# Patient Record
Sex: FEMALE | Race: WHITE | HISPANIC OR LATINO
[De-identification: names, ages, dates, MRNs, and addresses within clinical notes are randomized per-mention and may not be internally consistent; named-entity substitution may affect disease eponyms.]

---

## 2024-09-30 ENCOUNTER — RESULT REVIEW (OUTPATIENT)
Age: 74
End: 2024-09-30

## 2024-09-30 ENCOUNTER — INPATIENT (INPATIENT)
Facility: HOSPITAL | Age: 74
LOS: 13 days | Discharge: NURSING, REHAB OR LONG-TERM CARE | DRG: 282 | End: 2024-10-14
Attending: STUDENT IN AN ORGANIZED HEALTH CARE EDUCATION/TRAINING PROGRAM | Admitting: INTERNAL MEDICINE
Payer: MEDICARE

## 2024-09-30 VITALS
RESPIRATION RATE: 20 BRPM | TEMPERATURE: 98 F | HEIGHT: 69 IN | DIASTOLIC BLOOD PRESSURE: 67 MMHG | SYSTOLIC BLOOD PRESSURE: 95 MMHG | HEART RATE: 99 BPM | WEIGHT: 167.55 LBS | OXYGEN SATURATION: 98 %

## 2024-09-30 PROCEDURE — 99291 CRITICAL CARE FIRST HOUR: CPT | Mod: GC

## 2024-09-30 NOTE — ED ADULT TRIAGE NOTE - CCCP TRG CHIEF CMPLNT
Pt states she has had a migraine for a little over a week. Pt states she was seen by her primary care doctor for same and was given injections. Pt states pain has continued.     Heidi Gutierrez RN
cardiology transfer

## 2024-09-30 NOTE — ED ADULT TRIAGE NOTE - CHIEF COMPLAINT QUOTE
Quality 47: Advance Care Plan: Advance care planning not documented, reason not otherwise specified. Quality 110: Preventive Care And Screening: Influenza Immunization: Influenza Immunization previously received during influenza season Detail Level: Generalized Quality 226: Preventive Care And Screening: Tobacco Use: Screening And Cessation Intervention: Patient screened for tobacco use and is an ex/non-smoker cardiology transfer, NSTEMI

## 2024-10-01 DIAGNOSIS — I63.9 CEREBRAL INFARCTION, UNSPECIFIED: Chronic | ICD-10-CM

## 2024-10-01 DIAGNOSIS — I25.10 ATHEROSCLEROTIC HEART DISEASE OF NATIVE CORONARY ARTERY WITHOUT ANGINA PECTORIS: ICD-10-CM

## 2024-10-01 DIAGNOSIS — I21.9 ACUTE MYOCARDIAL INFARCTION, UNSPECIFIED: ICD-10-CM

## 2024-10-01 DIAGNOSIS — E03.9 HYPOTHYROIDISM, UNSPECIFIED: ICD-10-CM

## 2024-10-01 LAB
A1C WITH ESTIMATED AVERAGE GLUCOSE RESULT: 6.8 % — HIGH (ref 4–5.6)
ALBUMIN SERPL ELPH-MCNC: 3.3 G/DL — SIGNIFICANT CHANGE UP (ref 3.3–5)
ALBUMIN SERPL ELPH-MCNC: 3.5 G/DL — SIGNIFICANT CHANGE UP (ref 3.3–5)
ALP SERPL-CCNC: 118 U/L — SIGNIFICANT CHANGE UP (ref 40–120)
ALP SERPL-CCNC: 129 U/L — HIGH (ref 40–120)
ALT FLD-CCNC: 48 U/L — HIGH (ref 10–45)
ALT FLD-CCNC: 53 U/L — HIGH (ref 10–45)
ANION GAP SERPL CALC-SCNC: 17 MMOL/L — SIGNIFICANT CHANGE UP (ref 5–17)
ANION GAP SERPL CALC-SCNC: 18 MMOL/L — HIGH (ref 5–17)
APPEARANCE UR: CLEAR — SIGNIFICANT CHANGE UP
APTT BLD: 37.7 SEC — HIGH (ref 24.5–35.6)
APTT BLD: 41.8 SEC — HIGH (ref 24.5–35.6)
APTT BLD: 45.8 SEC — HIGH (ref 24.5–35.6)
APTT BLD: 90.4 SEC — HIGH (ref 24.5–35.6)
AST SERPL-CCNC: 85 U/L — HIGH (ref 10–40)
AST SERPL-CCNC: 88 U/L — HIGH (ref 10–40)
BASE EXCESS BLDMV CALC-SCNC: -7 MMOL/L — LOW (ref -3–3)
BASOPHILS # BLD AUTO: 0 K/UL — SIGNIFICANT CHANGE UP (ref 0–0.2)
BASOPHILS # BLD AUTO: 0.05 K/UL — SIGNIFICANT CHANGE UP (ref 0–0.2)
BASOPHILS NFR BLD AUTO: 0 % — SIGNIFICANT CHANGE UP (ref 0–2)
BASOPHILS NFR BLD AUTO: 0.3 % — SIGNIFICANT CHANGE UP (ref 0–2)
BILIRUB SERPL-MCNC: 0.5 MG/DL — SIGNIFICANT CHANGE UP (ref 0.2–1.2)
BILIRUB SERPL-MCNC: 0.6 MG/DL — SIGNIFICANT CHANGE UP (ref 0.2–1.2)
BILIRUB UR-MCNC: NEGATIVE — SIGNIFICANT CHANGE UP
BLD GP AB SCN SERPL QL: NEGATIVE — SIGNIFICANT CHANGE UP
BUN SERPL-MCNC: 19 MG/DL — SIGNIFICANT CHANGE UP (ref 7–23)
BUN SERPL-MCNC: 21 MG/DL — SIGNIFICANT CHANGE UP (ref 7–23)
CALCIUM SERPL-MCNC: 8.3 MG/DL — LOW (ref 8.4–10.5)
CALCIUM SERPL-MCNC: 8.6 MG/DL — SIGNIFICANT CHANGE UP (ref 8.4–10.5)
CHLORIDE SERPL-SCNC: 99 MMOL/L — SIGNIFICANT CHANGE UP (ref 96–108)
CHLORIDE SERPL-SCNC: 99 MMOL/L — SIGNIFICANT CHANGE UP (ref 96–108)
CHOLEST SERPL-MCNC: 82 MG/DL — SIGNIFICANT CHANGE UP
CO2 BLDMV-SCNC: 20 MMOL/L — LOW (ref 21–29)
CO2 SERPL-SCNC: 16 MMOL/L — LOW (ref 22–31)
CO2 SERPL-SCNC: 18 MMOL/L — LOW (ref 22–31)
COLOR SPEC: YELLOW — SIGNIFICANT CHANGE UP
CREAT SERPL-MCNC: 1.28 MG/DL — SIGNIFICANT CHANGE UP (ref 0.5–1.3)
CREAT SERPL-MCNC: 1.29 MG/DL — SIGNIFICANT CHANGE UP (ref 0.5–1.3)
DIFF PNL FLD: ABNORMAL
EGFR: 44 ML/MIN/1.73M2 — LOW
EGFR: 44 ML/MIN/1.73M2 — LOW
EOSINOPHIL # BLD AUTO: 0 K/UL — SIGNIFICANT CHANGE UP (ref 0–0.5)
EOSINOPHIL # BLD AUTO: 0 K/UL — SIGNIFICANT CHANGE UP (ref 0–0.5)
EOSINOPHIL NFR BLD AUTO: 0 % — SIGNIFICANT CHANGE UP (ref 0–6)
EOSINOPHIL NFR BLD AUTO: 0 % — SIGNIFICANT CHANGE UP (ref 0–6)
ESTIMATED AVERAGE GLUCOSE: 148 MG/DL — HIGH (ref 68–114)
FIBRINOGEN PPP-MCNC: 790 MG/DL — HIGH (ref 200–445)
GAS PNL BLDMV: SIGNIFICANT CHANGE UP
GAS PNL BLDV: SIGNIFICANT CHANGE UP
GIANT PLATELETS BLD QL SMEAR: PRESENT — SIGNIFICANT CHANGE UP
GLUCOSE BLDC GLUCOMTR-MCNC: 126 MG/DL — HIGH (ref 70–99)
GLUCOSE BLDC GLUCOMTR-MCNC: 132 MG/DL — HIGH (ref 70–99)
GLUCOSE BLDC GLUCOMTR-MCNC: 221 MG/DL — HIGH (ref 70–99)
GLUCOSE SERPL-MCNC: 202 MG/DL — HIGH (ref 70–99)
GLUCOSE SERPL-MCNC: 217 MG/DL — HIGH (ref 70–99)
GLUCOSE UR QL: NEGATIVE MG/DL — SIGNIFICANT CHANGE UP
HCO3 BLDMV-SCNC: 19 MMOL/L — LOW (ref 20–28)
HCT VFR BLD CALC: 25 % — LOW (ref 34.5–45)
HCT VFR BLD CALC: 27 % — LOW (ref 34.5–45)
HCT VFR BLD CALC: 29.4 % — LOW (ref 34.5–45)
HDLC SERPL-MCNC: 27 MG/DL — LOW
HGB BLD-MCNC: 8.2 G/DL — LOW (ref 11.5–15.5)
HGB BLD-MCNC: 8.8 G/DL — LOW (ref 11.5–15.5)
HGB BLD-MCNC: 9.3 G/DL — LOW (ref 11.5–15.5)
IMM GRANULOCYTES NFR BLD AUTO: 0.7 % — SIGNIFICANT CHANGE UP (ref 0–0.9)
INR BLD: 1.22 RATIO — HIGH (ref 0.85–1.16)
INR BLD: 1.24 RATIO — HIGH (ref 0.85–1.16)
KETONES UR-MCNC: ABNORMAL MG/DL
LACTATE BLDV-MCNC: 1.6 MMOL/L — SIGNIFICANT CHANGE UP (ref 0.5–2)
LEUKOCYTE ESTERASE UR-ACNC: ABNORMAL
LIPID PNL WITH DIRECT LDL SERPL: 37 MG/DL — SIGNIFICANT CHANGE UP
LYMPHOCYTES # BLD AUTO: 1.37 K/UL — SIGNIFICANT CHANGE UP (ref 1–3.3)
LYMPHOCYTES # BLD AUTO: 1.58 K/UL — SIGNIFICANT CHANGE UP (ref 1–3.3)
LYMPHOCYTES # BLD AUTO: 6.9 % — LOW (ref 13–44)
LYMPHOCYTES # BLD AUTO: 7.4 % — LOW (ref 13–44)
MAGNESIUM SERPL-MCNC: 1.7 MG/DL — SIGNIFICANT CHANGE UP (ref 1.6–2.6)
MANUAL SMEAR VERIFICATION: SIGNIFICANT CHANGE UP
MCHC RBC-ENTMCNC: 27 PG — SIGNIFICANT CHANGE UP (ref 27–34)
MCHC RBC-ENTMCNC: 27.1 PG — SIGNIFICANT CHANGE UP (ref 27–34)
MCHC RBC-ENTMCNC: 27.6 PG — SIGNIFICANT CHANGE UP (ref 27–34)
MCHC RBC-ENTMCNC: 31.6 GM/DL — LOW (ref 32–36)
MCHC RBC-ENTMCNC: 32.6 GM/DL — SIGNIFICANT CHANGE UP (ref 32–36)
MCHC RBC-ENTMCNC: 32.8 GM/DL — SIGNIFICANT CHANGE UP (ref 32–36)
MCV RBC AUTO: 82.5 FL — SIGNIFICANT CHANGE UP (ref 80–100)
MCV RBC AUTO: 84.6 FL — SIGNIFICANT CHANGE UP (ref 80–100)
MCV RBC AUTO: 85.2 FL — SIGNIFICANT CHANGE UP (ref 80–100)
MONOCYTES # BLD AUTO: 1.13 K/UL — HIGH (ref 0–0.9)
MONOCYTES # BLD AUTO: 1.4 K/UL — HIGH (ref 0–0.9)
MONOCYTES NFR BLD AUTO: 6.1 % — SIGNIFICANT CHANGE UP (ref 2–14)
MONOCYTES NFR BLD AUTO: 6.1 % — SIGNIFICANT CHANGE UP (ref 2–14)
MRSA PCR RESULT.: SIGNIFICANT CHANGE UP
NEUTROPHILS # BLD AUTO: 15.8 K/UL — HIGH (ref 1.8–7.4)
NEUTROPHILS # BLD AUTO: 19.93 K/UL — HIGH (ref 1.8–7.4)
NEUTROPHILS NFR BLD AUTO: 85.5 % — HIGH (ref 43–77)
NEUTROPHILS NFR BLD AUTO: 87 % — HIGH (ref 43–77)
NITRITE UR-MCNC: NEGATIVE — SIGNIFICANT CHANGE UP
NON HDL CHOLESTEROL: 55 MG/DL — SIGNIFICANT CHANGE UP
NRBC # BLD: 0 /100 WBCS — SIGNIFICANT CHANGE UP (ref 0–0)
NRBC # BLD: 0 /100 WBCS — SIGNIFICANT CHANGE UP (ref 0–0)
NT-PROBNP SERPL-SCNC: HIGH PG/ML (ref 0–300)
O2 CT VFR BLD CALC: 35 MMHG — SIGNIFICANT CHANGE UP (ref 30–65)
PA ADP PRP-ACNC: 181 PRU — LOW (ref 182–335)
PCO2 BLDMV: 37 MMHG — SIGNIFICANT CHANGE UP (ref 30–65)
PH BLDMV: 7.31 — LOW (ref 7.32–7.45)
PH UR: 5.5 — SIGNIFICANT CHANGE UP (ref 5–8)
PHOSPHATE SERPL-MCNC: 4.3 MG/DL — SIGNIFICANT CHANGE UP (ref 2.5–4.5)
PLAT MORPH BLD: NORMAL — SIGNIFICANT CHANGE UP
PLATELET # BLD AUTO: 193 K/UL — SIGNIFICANT CHANGE UP (ref 150–400)
PLATELET # BLD AUTO: 211 K/UL — SIGNIFICANT CHANGE UP (ref 150–400)
PLATELET # BLD AUTO: 250 K/UL — SIGNIFICANT CHANGE UP (ref 150–400)
POTASSIUM SERPL-MCNC: 4 MMOL/L — SIGNIFICANT CHANGE UP (ref 3.5–5.3)
POTASSIUM SERPL-MCNC: 4 MMOL/L — SIGNIFICANT CHANGE UP (ref 3.5–5.3)
POTASSIUM SERPL-SCNC: 4 MMOL/L — SIGNIFICANT CHANGE UP (ref 3.5–5.3)
POTASSIUM SERPL-SCNC: 4 MMOL/L — SIGNIFICANT CHANGE UP (ref 3.5–5.3)
PROT SERPL-MCNC: 7.2 G/DL — SIGNIFICANT CHANGE UP (ref 6–8.3)
PROT SERPL-MCNC: 7.6 G/DL — SIGNIFICANT CHANGE UP (ref 6–8.3)
PROT UR-MCNC: NEGATIVE MG/DL — SIGNIFICANT CHANGE UP
PROTHROM AB SERPL-ACNC: 13.9 SEC — HIGH (ref 9.9–13.4)
PROTHROM AB SERPL-ACNC: 14.1 SEC — HIGH (ref 9.9–13.4)
RBC # BLD: 3.03 M/UL — LOW (ref 3.8–5.2)
RBC # BLD: 3.19 M/UL — LOW (ref 3.8–5.2)
RBC # BLD: 3.45 M/UL — LOW (ref 3.8–5.2)
RBC # FLD: 14.8 % — HIGH (ref 10.3–14.5)
RBC # FLD: 14.8 % — HIGH (ref 10.3–14.5)
RBC # FLD: 15 % — HIGH (ref 10.3–14.5)
RBC BLD AUTO: NORMAL — SIGNIFICANT CHANGE UP
RH IG SCN BLD-IMP: POSITIVE — SIGNIFICANT CHANGE UP
RH IG SCN BLD-IMP: POSITIVE — SIGNIFICANT CHANGE UP
S AUREUS DNA NOSE QL NAA+PROBE: SIGNIFICANT CHANGE UP
SAO2 % BLDMV: 61.3 — SIGNIFICANT CHANGE UP (ref 60–90)
SODIUM SERPL-SCNC: 133 MMOL/L — LOW (ref 135–145)
SODIUM SERPL-SCNC: 134 MMOL/L — LOW (ref 135–145)
SP GR SPEC: 1.02 — SIGNIFICANT CHANGE UP (ref 1–1.03)
T3 SERPL-MCNC: 82 NG/DL — SIGNIFICANT CHANGE UP (ref 80–200)
T4 AB SER-ACNC: 8.1 UG/DL — SIGNIFICANT CHANGE UP (ref 4.6–12)
T4 FREE SERPL-MCNC: 1.8 NG/DL — SIGNIFICANT CHANGE UP (ref 0.9–1.8)
TRIGL SERPL-MCNC: 92 MG/DL — SIGNIFICANT CHANGE UP
TROPONIN T, HIGH SENSITIVITY RESULT: 1194 NG/L — HIGH (ref 0–51)
TSH SERPL-MCNC: 0.63 UIU/ML — SIGNIFICANT CHANGE UP (ref 0.27–4.2)
UROBILINOGEN FLD QL: 1 MG/DL — SIGNIFICANT CHANGE UP (ref 0.2–1)
WBC # BLD: 17.83 K/UL — HIGH (ref 3.8–10.5)
WBC # BLD: 18.48 K/UL — HIGH (ref 3.8–10.5)
WBC # BLD: 22.91 K/UL — HIGH (ref 3.8–10.5)
WBC # FLD AUTO: 17.83 K/UL — HIGH (ref 3.8–10.5)
WBC # FLD AUTO: 18.48 K/UL — HIGH (ref 3.8–10.5)
WBC # FLD AUTO: 22.91 K/UL — HIGH (ref 3.8–10.5)

## 2024-10-01 PROCEDURE — 74018 RADEX ABDOMEN 1 VIEW: CPT | Mod: 26

## 2024-10-01 PROCEDURE — 99291 CRITICAL CARE FIRST HOUR: CPT

## 2024-10-01 PROCEDURE — 33967 INSERT I-AORT PERCUT DEVICE: CPT

## 2024-10-01 PROCEDURE — 99292 CRITICAL CARE ADDL 30 MIN: CPT | Mod: FS

## 2024-10-01 PROCEDURE — 71045 X-RAY EXAM CHEST 1 VIEW: CPT | Mod: 26

## 2024-10-01 PROCEDURE — 99152 MOD SED SAME PHYS/QHP 5/>YRS: CPT

## 2024-10-01 PROCEDURE — 93010 ELECTROCARDIOGRAM REPORT: CPT

## 2024-10-01 PROCEDURE — 93880 EXTRACRANIAL BILAT STUDY: CPT | Mod: 26

## 2024-10-01 PROCEDURE — 93456 R HRT CORONARY ARTERY ANGIO: CPT | Mod: 26

## 2024-10-01 PROCEDURE — 93306 TTE W/DOPPLER COMPLETE: CPT | Mod: 26

## 2024-10-01 RX ORDER — LOSARTAN POTASSIUM 100 MG/1
1 TABLET, FILM COATED ORAL
Refills: 0 | DISCHARGE

## 2024-10-01 RX ORDER — METOCLOPRAMIDE HCL 5 MG
10 TABLET ORAL ONCE
Refills: 0 | Status: COMPLETED | OUTPATIENT
Start: 2024-10-01 | End: 2024-10-01

## 2024-10-01 RX ORDER — FUROSEMIDE 10 MG/ML
40 INJECTION INTRAVENOUS ONCE
Refills: 0 | Status: COMPLETED | OUTPATIENT
Start: 2024-10-01 | End: 2024-10-01

## 2024-10-01 RX ORDER — INSULIN LISPRO 100/ML
VIAL (ML) SUBCUTANEOUS EVERY 6 HOURS
Refills: 0 | Status: DISCONTINUED | OUTPATIENT
Start: 2024-10-01 | End: 2024-10-02

## 2024-10-01 RX ORDER — CHLORHEXIDINE GLUCONATE ORAL RINSE 1.2 MG/ML
1 SOLUTION DENTAL ONCE
Refills: 0 | Status: COMPLETED | OUTPATIENT
Start: 2024-10-02 | End: 2024-10-02

## 2024-10-01 RX ORDER — ASPIRIN 325 MG
81 TABLET ORAL DAILY
Refills: 0 | Status: DISCONTINUED | OUTPATIENT
Start: 2024-10-01 | End: 2024-10-03

## 2024-10-01 RX ORDER — ONDANSETRON HCL/PF 4 MG/2 ML
4 VIAL (ML) INJECTION ONCE
Refills: 0 | Status: COMPLETED | OUTPATIENT
Start: 2024-10-01 | End: 2024-10-01

## 2024-10-01 RX ORDER — GUAIFENESIN 100 MG/5ML
100 SOLUTION ORAL ONCE
Refills: 0 | Status: COMPLETED | OUTPATIENT
Start: 2024-10-01 | End: 2024-10-01

## 2024-10-01 RX ORDER — MUPIROCIN 20 MG/G
1 OINTMENT TOPICAL
Refills: 0 | Status: DISCONTINUED | OUTPATIENT
Start: 2024-10-01 | End: 2024-10-02

## 2024-10-01 RX ORDER — FUROSEMIDE 10 MG/ML
20 INJECTION INTRAVENOUS ONCE
Refills: 0 | Status: COMPLETED | OUTPATIENT
Start: 2024-10-01 | End: 2024-10-01

## 2024-10-01 RX ORDER — METOPROLOL TARTRATE 50 MG
25 TABLET ORAL
Refills: 0 | Status: DISCONTINUED | OUTPATIENT
Start: 2024-10-01 | End: 2024-10-03

## 2024-10-01 RX ORDER — CHLORHEXIDINE GLUCONATE ORAL RINSE 1.2 MG/ML
1 SOLUTION DENTAL
Refills: 0 | Status: DISCONTINUED | OUTPATIENT
Start: 2024-10-01 | End: 2024-10-03

## 2024-10-01 RX ORDER — GABAPENTIN 800 MG/1
200 TABLET, FILM COATED ORAL ONCE
Refills: 0 | Status: COMPLETED | OUTPATIENT
Start: 2024-10-01 | End: 2024-10-03

## 2024-10-01 RX ORDER — CHLORHEXIDINE GLUCONATE ORAL RINSE 1.2 MG/ML
1 SOLUTION DENTAL ONCE
Refills: 0 | Status: COMPLETED | OUTPATIENT
Start: 2024-10-01 | End: 2024-10-01

## 2024-10-01 RX ORDER — FENTANYL CITRATE-0.9 % NACL/PF 300MCG/30
25 PATIENT CONTROLLED ANALGESIA VIAL INJECTION ONCE
Refills: 0 | Status: DISCONTINUED | OUTPATIENT
Start: 2024-10-01 | End: 2024-10-01

## 2024-10-01 RX ORDER — MAGNESIUM SULFATE 500 MG/ML
2 VIAL (ML) INJECTION ONCE
Refills: 0 | Status: COMPLETED | OUTPATIENT
Start: 2024-10-01 | End: 2024-10-01

## 2024-10-01 RX ORDER — ATORVASTATIN CALCIUM 10 MG/1
80 TABLET, FILM COATED ORAL AT BEDTIME
Refills: 0 | Status: DISCONTINUED | OUTPATIENT
Start: 2024-10-01 | End: 2024-10-03

## 2024-10-01 RX ORDER — CHLORHEXIDINE GLUCONATE ORAL RINSE 1.2 MG/ML
30 SOLUTION DENTAL ONCE
Refills: 0 | Status: COMPLETED | OUTPATIENT
Start: 2024-10-01 | End: 2024-10-03

## 2024-10-01 RX ORDER — ACETAMINOPHEN 325 MG
1000 TABLET ORAL ONCE
Refills: 0 | Status: COMPLETED | OUTPATIENT
Start: 2024-10-01 | End: 2024-10-03

## 2024-10-01 RX ADMIN — ATORVASTATIN CALCIUM 80 MILLIGRAM(S): 10 TABLET, FILM COATED ORAL at 21:00

## 2024-10-01 RX ADMIN — Medication 10 MILLIGRAM(S): at 18:45

## 2024-10-01 RX ADMIN — MUPIROCIN 1 APPLICATION(S): 20 OINTMENT TOPICAL at 18:11

## 2024-10-01 RX ADMIN — Medication 4: at 05:20

## 2024-10-01 RX ADMIN — FUROSEMIDE 20 MILLIGRAM(S): 10 INJECTION INTRAVENOUS at 15:39

## 2024-10-01 RX ADMIN — Medication 4 MILLIGRAM(S): at 00:40

## 2024-10-01 RX ADMIN — Medication 25 MICROGRAM(S): at 02:45

## 2024-10-01 RX ADMIN — GUAIFENESIN 100 MILLIGRAM(S): 100 SOLUTION ORAL at 11:20

## 2024-10-01 RX ADMIN — Medication 25 GRAM(S): at 04:46

## 2024-10-01 RX ADMIN — Medication 25 MICROGRAM(S): at 02:34

## 2024-10-01 RX ADMIN — Medication 75 MICROGRAM(S): at 05:20

## 2024-10-01 RX ADMIN — FUROSEMIDE 40 MILLIGRAM(S): 10 INJECTION INTRAVENOUS at 01:04

## 2024-10-01 RX ADMIN — FUROSEMIDE 40 MILLIGRAM(S): 10 INJECTION INTRAVENOUS at 02:34

## 2024-10-01 RX ADMIN — Medication 11 UNIT(S)/HR: at 19:02

## 2024-10-01 RX ADMIN — Medication 25 MILLIGRAM(S): at 18:11

## 2024-10-01 RX ADMIN — Medication 10 MILLIGRAM(S): at 03:17

## 2024-10-01 RX ADMIN — Medication 4 MILLIGRAM(S): at 02:27

## 2024-10-01 RX ADMIN — Medication 50 MILLIGRAM(S): at 21:00

## 2024-10-01 RX ADMIN — Medication 9 UNIT(S)/HR: at 03:43

## 2024-10-01 RX ADMIN — Medication 81 MILLIGRAM(S): at 11:20

## 2024-10-01 RX ADMIN — CHLORHEXIDINE GLUCONATE ORAL RINSE 1 APPLICATION(S): 1.2 SOLUTION DENTAL at 21:01

## 2024-10-01 RX ADMIN — Medication 11 UNIT(S)/HR: at 15:00

## 2024-10-01 RX ADMIN — CHLORHEXIDINE GLUCONATE ORAL RINSE 1 APPLICATION(S): 1.2 SOLUTION DENTAL at 21:00

## 2024-10-01 NOTE — H&P ADULT - ATTENDING COMMENTS
NSTEMI complicated by hypotension  IABP placed for hemodynamic support and coronary perfusion  Multivessel coronary artery disease  Plan for CABG per CT surgery - will trend P2Y12

## 2024-10-01 NOTE — CONSULT NOTE ADULT - SUBJECTIVE AND OBJECTIVE BOX
History of Present Illness:  HPI:  73 y/o female w/ PMH of hypothyroidism, CVA x2 (2018), HTN, HLD and DM who initially p/t Mango's w/ worsening back pain, SOB, N/V and CP. She was transferred as a Tier I w/ c/f NSTEMI and hypotension (SBP 80s). Her son was at bedside and says she it typically mobile with her walker and has never had such back pain or CP. ECG with ST depressions I, II, aVL, V2-V5 and ST elevations aVR, III, V1. She was ASA and brilinta and heparin loaded at OSH. Pt. underwent R/LHC found to have severe LM and LAD disease and moderate pRCA disease. IABP placed for coronary perfusion and admitted to CICU for CABG eval. On arrival to unit pt. endorses nausea and had one episode of emesis relieved w/ antiemetics. (01 Oct 2024 03:06)       Past Medical History  Hypothyroidism    Hypertension    CAD (coronary artery disease)    Hyperlipidemia    Stroke    Diabetes        Past Surgical History  S/P cholecystectomy    Stroke of unusual cause        MEDICATIONS  (STANDING):  aspirin  chewable 81 milliGRAM(s) Oral daily  atorvastatin 80 milliGRAM(s) Oral at bedtime  chlorhexidine 2% Cloths 1 Application(s) Topical <User Schedule>  heparin  Infusion 900 Unit(s)/Hr (9 mL/Hr) IV Continuous <Continuous>  insulin lispro (ADMELOG) corrective regimen sliding scale   SubCutaneous every 6 hours  levothyroxine 75 MICROGram(s) Oral daily  metoprolol tartrate 25 milliGRAM(s) Oral two times a day      Vital Signs Last 24 Hrs  T(C): 36.9 (10-01-24 @ 08:00), Max: 36.9 (10-01-24 @ 02:15)  T(F): 98.4 (10-01-24 @ 08:00), Max: 98.4 (10-01-24 @ 02:15)  HR: 76 (10-01-24 @ 09:00) (76 - 112)  BP: 120/82 (10-01-24 @ 02:15) (87/46 - 120/82)  RR: 27 (10-01-24 @ 09:00) (20 - 49)  SpO2: 98% (10-01-24 @ 09:00) (93% - 100%)             Height (cm): 175.3   Weight (kg): 79.5   BMI (kg/m2): 25.9 (01 Oct 2024 02:15)      Allergies: No Known Allergies      SOCIAL HISTORY:  Smoker: [ ] Yes  [X] No                 Pt denies  ETOH use: [ ] Yes  [X] No             Pt denies  Ilicit Drug use:  [ ] Yes  [X] No     Pt denies      Review of Systems  GENERAL:  no weakness, fatigue, fevers or chills  NEURO: no dizziness, numbness, tingling or weakness  SKIN: no itching, burning, rashes, or lesions   HEENT: no visual changes;  no headache, no vertigo, no recent colds  RESPIRATORY: no shortness of breath, no cough, sputum, wheezing  CARDIOVASCULAR:  no chest pain,  or palpitations  GI: no abd pain. no N/V/D.  PERIPHERAL VASCULAR: no swelling, no tenderness, no erythema    PHYSICAL EXAM  General: Well nourished, well developed, NAD.                                              Neuro: Normal exam oriented to person/place & time with no focal motor or sensory  deficits.                    Eyes: Normal exam of conjunctiva & lids, pupils equally reactive.   ENT: Normal exam of nasal/oral mucosa with absence of cyanosis.   Neck: Normal exam of jugular veins, trachea & thyroid.   Chest: Normal lung exam with good air movement absence of wheezes, rales, or rhonchi.                                                                         CV:  Auscultation: normal S1S2, RRR   Carotids: No Bruits[X]  Abdominal Aorta: normal [X] nonpalpable[X]                                                                         GI: Normal exam of abdomen with no noted masses or tenderness. +BSx4Q                                                                                            Extremities: Normal no evidence of cyanosis or deformity, Edema: none   +Right femoral IABP   Lower Extremity Pulses: Right[+2DP] Left[+2DP] Varicosities[none]  SKIN : Normal exam to inspection & palpation.                                                             LABS:                        8.8    18.48 )-----------( 211      ( 01 Oct 2024 03:01 )             27.0     133[L]  |  99  |  21  ----------------------------<  217[H]  4.0   |  16[L]  |  1.29    Ca    8.3[L]      01 Oct 2024 03:01  Phos  4.3     10-01  Mg     1.7     10-01    AST  85[H]  /  ALT  48[H]  /  AlkPhos  118  10-01    PT/INR - ( 01 Oct 2024 03:01 )   PT: 13.9 sec;   INR: 1.22 ratio   PTT:41.8 sec    P2Y12: 181 on 10/1    < from: TTE W or WO Ultrasound Enhancing Agent (10.01.24 @ 00:38) >  FINDINGS:     Right Ventricle:  The right ventricular cavity is normal in size and right ventricular systolic function is probably normal.     Mitral Valve:  At least "moderate" mitral regurgitation.     Aorta:  The aortic annulus and aortic root appear normal in size.    R/LHC 10/1: (prelim) severe LM and LAD disease and moderate pRCA disease   History of Present Illness:    This is a 73 y/o female w/ PMH of hypothyroidism, CVA x2 (2018), HTN, HLD and DM who initially p/t Mango's w/ worsening back pain, SOB, N/V and CP. She was transferred as a Tier I w/ c/f NSTEMI and hypotension (SBP 80s). Her son was at bedside and says she it typically mobile with her walker and has never had such back pain or CP. ECG with ST depressions I, II, aVL, V2-V5 and ST elevations aVR, III, V1. She was ASA and brilinta and heparin loaded at OSH. Pt. underwent R/LHC found to have severe LM and LAD disease and moderate pRCA disease. IABP placed for coronary perfusion and admitted to CICU for CABG eval. On arrival to unit pt. endorses nausea and had one episode of emesis relieved w/ antiemetics. (01 Oct 2024 03:06)       Past Medical History  Hypothyroidism    Hypertension    CAD (coronary artery disease)    Hyperlipidemia    Stroke    Diabetes        Past Surgical History  S/P cholecystectomy    Stroke of unusual cause        MEDICATIONS  (STANDING):  aspirin  chewable 81 milliGRAM(s) Oral daily  atorvastatin 80 milliGRAM(s) Oral at bedtime  chlorhexidine 2% Cloths 1 Application(s) Topical <User Schedule>  heparin  Infusion 900 Unit(s)/Hr (9 mL/Hr) IV Continuous <Continuous>  insulin lispro (ADMELOG) corrective regimen sliding scale   SubCutaneous every 6 hours  levothyroxine 75 MICROGram(s) Oral daily  metoprolol tartrate 25 milliGRAM(s) Oral two times a day      Vital Signs Last 24 Hrs  T(C): 36.9 (10-01-24 @ 08:00), Max: 36.9 (10-01-24 @ 02:15)  T(F): 98.4 (10-01-24 @ 08:00), Max: 98.4 (10-01-24 @ 02:15)  HR: 76 (10-01-24 @ 09:00) (76 - 112)  BP: 120/82 (10-01-24 @ 02:15) (87/46 - 120/82)  RR: 27 (10-01-24 @ 09:00) (20 - 49)  SpO2: 98% (10-01-24 @ 09:00) (93% - 100%)             Height (cm): 175.3   Weight (kg): 79.5   BMI (kg/m2): 25.9 (01 Oct 2024 02:15)      Allergies: No Known Allergies      SOCIAL HISTORY:  Yakut speaking, ambulates with cane at home, lives with son   Smoker: [ ] Yes  [X] No                 Pt denies  ETOH use: [ ] Yes  [X] No              Pt denies  Ilicit Drug use:  [ ] Yes  [X] No       Pt denies      Review of Systems  GENERAL:  no weakness, fatigue, fevers or chills  NEURO: no dizziness, numbness, tingling or weakness  SKIN: no itching, burning, rashes, or lesions   HEENT: no visual changes;  no headache, no vertigo, no recent colds  RESPIRATORY: no shortness of breath, no cough, sputum, wheezing  CARDIOVASCULAR:  no chest pain,  or palpitations  GI: no abd pain. no N/V/D.  PERIPHERAL VASCULAR: no swelling, no tenderness, no erythema    PHYSICAL EXAM  General: Well nourished, well developed, NAD.                                              Neuro: Normal exam oriented to person/place & time with no focal motor or sensory  deficits.                    Eyes: Normal exam of conjunctiva & lids, pupils equally reactive.   ENT: Normal exam of nasal/oral mucosa with absence of cyanosis.   Neck: Normal exam of jugular veins, trachea & thyroid.   Chest: Normal lung exam with good air movement absence of wheezes, rales, or rhonchi.                                                                         CV:  Auscultation: normal S1S2, RRR   Carotids: No Bruits[X]  Abdominal Aorta: normal [X] nonpalpable[X]                                                                         GI: Normal exam of abdomen with no noted masses or tenderness. +BSx4Q                                                                                            Extremities: Normal no evidence of cyanosis or deformity, Edema: none   +Right femoral IABP   Lower Extremity Pulses: Right[+2DP] Left[+2DP] Varicosities[none]  SKIN : Normal exam to inspection & palpation.                                                             LABS:                        8.8    18.48 )-----------( 211      ( 01 Oct 2024 03:01 )             27.0     133[L]  |  99  |  21  ----------------------------<  217[H]  4.0   |  16[L]  |  1.29    Ca    8.3[L]      01 Oct 2024 03:01  Phos  4.3     10-01  Mg     1.7     10-01    AST  85[H]  /  ALT  48[H]  /  AlkPhos  118  10-01    PT/INR - ( 01 Oct 2024 03:01 )   PT: 13.9 sec;   INR: 1.22 ratio   PTT:41.8 sec    P2Y12: 181 on 10/1    < from: TTE W or WO Ultrasound Enhancing Agent (10.01.24 @ 00:38) >  FINDINGS:     Right Ventricle:  The right ventricular cavity is normal in size and right ventricular systolic function is probably normal.     Mitral Valve:  At least "moderate" mitral regurgitation.     Aorta:  The aortic annulus and aortic root appear normal in size.    R/LHC 10/1: (prelim) severe LM and LAD disease and moderate pRCA disease

## 2024-10-01 NOTE — ED PROVIDER NOTE - CLINICAL SUMMARY MEDICAL DECISION MAKING FREE TEXT BOX
74 female past medical history hypertension, hypothyroidism, asthma, on aspirin presenting as an NSTEMI transfer.  Son at bedside for translation and collateral.  Patient presented to outside hospital with chest pain and shortness of breath.  EKG showed diffuse ST depressions patient had elevated troponin.  Was given 324 aspirin, Brilinta, started on heparin drip and transferred.  Hypotensive en route and given IV fluids by EMS.  Patient endorsing left shoulder pain currently as well as shortness of breath and nausea.  Blood pressure 95/67.  Heart rate high 90s.  Patient satting 99% on room air.  On physical exam heart regular rhythm.  Lungs with fine crackles in the bilateral bases.  Abdomen soft, nontender, nondistended.  Bilateral lower extremities with no edema.  Patient mentating.  EKG shows diffuse ST depressions.  Cardiology at bedside.  Bedside POCUS shows decreased cardiac squeeze and bilateral B-lines.  Plan: Blood work, chest x-ray, Zofran.  Will follow-up cardiology recommendations.  To be admitted. 74 female past medical history hypertension, hypothyroidism, asthma, on aspirin presenting as an NSTEMI transfer.  Son at bedside for translation and collateral.  Patient presented to outside hospital with chest pain and shortness of breath.  EKG showed diffuse ST depressions patient had elevated troponin.  Was given 324 aspirin, Brilinta, started on heparin drip and transferred.  Hypotensive en route and given IV fluids by EMS.  Patient endorsing left shoulder pain currently as well as shortness of breath and nausea.  Blood pressure 95/67.  Heart rate high 90s.  Patient satting 99% on room air.  On physical exam heart regular rhythm.  Lungs with fine crackles in the bilateral bases.  Abdomen soft, nontender, nondistended.  Bilateral lower extremities with no edema.  Patient mentating.  EKG shows diffuse ST depressions.  Cardiology at bedside.  Bedside POCUS shows decreased cardiac squeeze and bilateral B-lines.  DDx includes but not limited to: evolving NSTEMI. Plan: Blood work, chest x-ray, Zofran.  Will follow-up cardiology recommendations.  To be admitted.

## 2024-10-01 NOTE — CONSULT NOTE ADULT - SUBJECTIVE AND OBJECTIVE BOX
Registration Time: per ED notes  Initial EC:17   Called by Transfer Center: 9:55 PM  Saw patient at bedside: 11:34 PM  Called Cath Attending: 10:00 PM  Balloon Time: per cath lab notes    HPI:  This is a 75 y/o F with PMH of hypothyroidism, CVA x2 (residual R sided weakness), and HTN who presented as a transfer from [facility] due to back pain 8/10 in severity (worsening), as well as SOB, nonproductive cough, and associated chest pain with cough since Friday, 2024. She was transferred as a Tier I transfer from ER to ER due to hypotension to 80s/50s (MAP ~65), with HR 90s-100s, on room air saturating >94%. During transport, she was given ~800cc IVF. Her son was at bedside and says she it typically mobile with her walker and has never had such back pain or chest pain. No cardiac history in the past. Denies history of falls, syncope. Her son says she has been feeling weaker and because of the back pain, she decided to come in.     No HsTn available from OSH. However, HsTn in ER here 1,194. BNP 13,052. Lactic 1.9 with VBG pH 7.28, pCo2 40, HCO3 19.    ECG with ST depressions I, II, aVL, V2-V5  ST elevations aVR, III, V1.     S/p ASA 324mg, Brilinta 180mg BID, Heparin bolus and gtt at outside hospital.     WBC 22.91 (87% neutrophils), Hgb 9.3, Plt 250K. BUN 19, Cr 1.28.     Saturating >94% on room air.   B lines by ER POCUS on arrival. Given 40mg IV lasix. Started on HFNC with tachypnea to high 30s and discomfort for LHC/RHC.    Limited TTE with moderate MR, no estimation of LVEF able to be determined due to patient discomfort, vomiting, and body habitus.     Allergies:  No Known Allergies      Social History: Lives with son    All other review of systems is negative unless indicated above.    Physical Exam:  T(F): 98.2 (10-), Max: 98.2 (10-01)  HR: 112 (10-01) (99 - 112)  BP: 87/46 (10-01) (87/46 - 95/67)  RR: 24 (10-01)  SpO2: 96% (10-01)    GENERAL: Tachypneic, uncomfortable appearing. Obese woman. Well-developed  HEAD:  Atraumatic, Normocephalic  ENT: EOMI, PERRLA, conjunctiva and sclera clear, Neck supple, No JVD, moist mucosa  CHEST/LUNG: Crackles b/l greater at bases. Mild wheezing, equal breath sounds bilaterally   BACK: Pain on palpation of neck  HEART: Sinus tachycardia. Regular rhythm; Systolic murmur. No rubs or gallops  ABDOMEN: Distended, no tenderness to palpation. Bowel sounds present.   EXTREMITIES:  No clubbing, cyanosis, or edema  PSYCH: Nl behavior, nl affect  NEUROLOGY: AAOx3, non-focal, cranial nerves intact  SKIN: Normal color, No rashes or lesions    Cardiovascular Diagnostic Testing:    ECG: Personally reviewed    Echo: As above      Labs: Personally reviewed                        9.3    22.91 )-----------( 250      ( 01 Oct 2024 00:37 )             29.4     10-01    134[L]  |  99  |  19  ----------------------------<  202[H]  4.0   |  18[L]  |  1.28    Ca    8.6      01 Oct 2024 00:37    TPro  7.6  /  Alb  3.5  /  TBili  0.6  /  DBili  x   /  AST  88[H]  /  ALT  53[H]  /  AlkPhos  129[H]  10-01    PT/INR - ( 01 Oct 2024 00:37 )   PT: 14.1 sec;   INR: 1.24 ratio         PTT - ( 01 Oct 2024 00:37 )  PTT:90.4 sec  CARDIAC MARKERS ( 01 Oct 2024 00:37 )  1194 ng/L / x     / x     / x     / x     / x                 Registration Time: per ED notes  Initial EC:17   Called by Transfer Center: 9:55 PM  Saw patient at bedside: 11:34 PM  Called Cath Attending: 10:00 PM  Balloon Time: per cath lab notes    HPI:  This is a 75 y/o F with PMH of hypothyroidism, CVA x2 (residual R sided weakness), and HTN who presented as a transfer from [facility] due to back pain 8/10 in severity (worsening), as well as SOB, nonproductive cough, and associated chest pain with cough since Friday, 2024. She was transferred as a Tier I transfer from ER to ER as an NSTEMI due to hypotension to 80s/50s (MAP ~65), with HR 90s-100s, on room air saturating >94%. During transport, she was given ~800cc IVF. Her son was at bedside and says she it typically mobile with her walker and has never had such back pain or chest pain. No cardiac history in the past. Denies history of falls, syncope. Her son says she has been feeling weaker and because of the back pain, she decided to come in.     No HsTn available from OSH. However, HsTn in ER here 1,194. BNP 13,052. Lactic 1.9 with VBG pH 7.28, pCo2 40, HCO3 19.    ECG with ST depressions I, II, aVL, V2-V5  ST elevations aVR, III, V1.     S/p ASA 324mg, Brilinta 180mg BID, Heparin bolus and gtt at outside hospital.     WBC 22.91 (87% neutrophils), Hgb 9.3, Plt 250K. BUN 19, Cr 1.28.     Saturating >94% on room air.   B lines by ER POCUS on arrival. Given 40mg IV lasix. Started on HFNC with tachypnea to high 30s and discomfort for LHC/RHC.    Limited TTE with moderate MR, no estimation of LVEF able to be determined due to patient discomfort, vomiting, and body habitus.     Allergies:  No Known Allergies      Social History: Lives with son    All other review of systems is negative unless indicated above.    Physical Exam:  T(F): 98.2 (10-), Max: 98.2 (10-01)  HR: 112 (10-01) (99 - 112)  BP: 87/46 (10-01) (87/46 - 95/67)  RR: 24 (10-01)  SpO2: 96% (10-01)    GENERAL: Tachypneic, uncomfortable appearing. Obese woman. Well-developed  HEAD:  Atraumatic, Normocephalic  ENT: EOMI, PERRLA, conjunctiva and sclera clear, Neck supple, No JVD, moist mucosa  CHEST/LUNG: Crackles b/l greater at bases. Mild wheezing, equal breath sounds bilaterally   BACK: Pain on palpation of neck  HEART: Sinus tachycardia. Regular rhythm; Systolic murmur. No rubs or gallops  ABDOMEN: Distended, no tenderness to palpation. Bowel sounds present.   EXTREMITIES:  No clubbing, cyanosis, or edema  PSYCH: Nl behavior, nl affect  NEUROLOGY: AAOx3, non-focal, cranial nerves intact  SKIN: Normal color, No rashes or lesions    Cardiovascular Diagnostic Testing:    ECG: Personally reviewed    Echo: As above      Labs: Personally reviewed                        9.3    22.91 )-----------( 250      ( 01 Oct 2024 00:37 )             29.4     10-01    134[L]  |  99  |  19  ----------------------------<  202[H]  4.0   |  18[L]  |  1.28    Ca    8.6      01 Oct 2024 00:37    TPro  7.6  /  Alb  3.5  /  TBili  0.6  /  DBili  x   /  AST  88[H]  /  ALT  53[H]  /  AlkPhos  129[H]  10-01    PT/INR - ( 01 Oct 2024 00:37 )   PT: 14.1 sec;   INR: 1.24 ratio         PTT - ( 01 Oct 2024 00:37 )  PTT:90.4 sec  CARDIAC MARKERS ( 01 Oct 2024 00:37 )  1194 ng/L / x     / x     / x     / x     / x

## 2024-10-01 NOTE — PATIENT PROFILE ADULT - FALL HARM RISK - HARM RISK INTERVENTIONS
Assistance with ambulation/Assistance OOB with selected safe patient handling equipment/Communicate Risk of Fall with Harm to all staff/Discuss with provider need for PT consult/Monitor gait and stability/Provide patient with walking aids - walker, cane, crutches/Reinforce activity limits and safety measures with patient and family/Tailored Fall Risk Interventions/Visual Cue: Yellow wristband and red socks/Bed in lowest position, wheels locked, appropriate side rails in place/Call bell, personal items and telephone in reach/[place] to call system/Physically safe environment - no spills, clutter or unnecessary equipment/Purposeful Proactive Rounding/Room/bathroom lighting operational, light cord in reach

## 2024-10-01 NOTE — PRE PROCEDURE NOTE - PRE PROCEDURE EVALUATION
Cardiac Surgery Pre-op Note:     Surgeon:  Dr. Martini    Procedure: 10/2/24 CABG      HPI:  73 y/o female w/ PMH of hypothyroidism, CVA x2 (2018), HTN, HLD and DM who initially p/t [facility] w/ worsening back pain, SOB, N/V and CP. She was transferred as a Tier I w/ c/f NSTEMI and hypotension (SBP 80s). Her son was at bedside and says she it typically mobile with her walker and has never had such back pain or CP. ECG with ST depressions I, II, aVL, V2-V5 and ST elevations aVR, III, V1. She was ASA and brilinta and heparin loaded at OSH. Pt. underwent R/LHC found to have severe LM and LAD disease and moderate pRCA disease. IABP placed for coronary perfusion and admitted to CICU for CABG eval. On arrival to unit pt. endorses nausea and had one episode of emesis relieved w/ antiemetics. (01 Oct 2024 03:06)      PAST MEDICAL & SURGICAL HISTORY:  Hypothyroidism      Hypertension      Hyperlipidemia      Stroke      Diabetes      S/P cholecystectomy          MEDICATIONS  (STANDING):  acetaminophen     Tablet .. 1000 milliGRAM(s) Oral once  ascorbic acid 2000 milliGRAM(s) Oral once  aspirin  chewable 81 milliGRAM(s) Oral daily  atorvastatin 80 milliGRAM(s) Oral at bedtime  cefuroxime  IVPB 1500 milliGRAM(s) IV Intermittent once  chlorhexidine 0.12% Liquid 30 milliLiter(s) Swish and Spit once  chlorhexidine 2% Cloths 1 Application(s) Topical <User Schedule>  chlorhexidine 4% Liquid 1 Application(s) Topical once  gabapentin 200 milliGRAM(s) Oral once  heparin  Infusion 900 Unit(s)/Hr (9 mL/Hr) IV Continuous <Continuous>  insulin lispro (ADMELOG) corrective regimen sliding scale   SubCutaneous every 6 hours  levothyroxine 75 MICROGram(s) Oral daily  metoprolol tartrate 25 milliGRAM(s) Oral two times a day  mupirocin 2% Ointment 1 Application(s) Both Nostrils two times a day      Vital Signs Last 24 Hrs  T(C): 36.9 (10-01-24 @ 08:00), Max: 36.9 (10-01-24 @ 02:15)  T(F): 98.4 (10-01-24 @ 08:00), Max: 98.4 (10-01-24 @ 02:15)  HR: 81 (10-01-24 @ 11:00) (76 - 112)  BP: 120/82 (10-01-24 @ 02:15) (87/46 - 120/82)  RR: 28 (10-01-24 @ 11:00) (20 - 49)  SpO2: 99% (10-01-24 @ 11:00) (93% - 100%)              Height (cm): 175.3   Weight (kg): 79.5   BMI (kg/m2): 25.9     (01 Oct 2024 02:15)      Labs:                        8.8    18.48 )-----------( 211      ( 01 Oct 2024 03:01 )             27.0     133[L]  |  99  |  21  ----------------------------<  217[H]  4.0   |  16[L]  |  1.29    Ca    8.3[L]      01 Oct 2024 03:01  Phos  4.3     10-01  Mg     1.7     10-01    AST  85[H]  /  ALT  48[H]  /  AlkPhos  118  10-01    PT/INR/PTT - ( 01 Oct 2024 03:01 )   PT: 13.9 sec;   INR: 1.22 ratio    PTT:41.8 sec    ABO Interpretation: A (10-01 @ 03:06)    Thyroid Panel: pending    MRSA:  / MSSA: pending    Urinalysis Basic - ( 01 Oct 2024 03:01 )  Color: x / Appearance: x / SG: x / pH: x  Gluc: 217 mg/dL / Ketone: x  / Bili: x / Urobili: x   Blood: x / Protein: x / Nitrite: x   Leuk Esterase: x / RBC: x / WBC x   Sq Epi: x / Non Sq Epi: x / Bacteria: x    < from: Xray Chest 1 View- PORTABLE-Urgent (10.01.24 @ 00:39) >  IMPRESSION:  Hazy bilateral reticulonodular opacities may represent pulmonary edema,   pneumonia or interstitial lung disease.      Carotid Duplex:  pending    PFT's: pending    P2Y12: 181 on 10/1    < from: TTE W or WO Ultrasound Enhancing Agent (10.01.24 @ 00:38) >  FINDINGS:  Right Ventricle:  The right ventricular cavity is normal in size and right ventricular systolic function is probably normal.     Mitral Valve:  At least "moderate" mitral regurgitation.     Aorta:  The aortic annulus and aortic root appear normal in size.    R/[facility] 10/1: (prelim) severe LM and LAD disease and moderate pRCA disease      PHYSICAL EXAM:  Neuro: A&Ox3, NAD  Pulm: B/L BS CTA  CV: RRR,+S1S2  Abd: Soft, NT/ND +BSX4Q  Ext: B/L LE no edema, +PP, no calf tenderness +Right fem IABP      Pt has AICD/PPM [ ] Yes  [x ] No               Pre-op Beta Blocker ordered within 24 hrs of surgery (CABG ONLY)?  [x ] Yes  [ ] No  If not, Why?  Type & Cross  [X] Yes  [ ] No  NPO after Midnight [x ] Yes  [ ] No  Pre-op ABX ordered, to be taped on chart:  [ x] Yes  [ ] No     Hibiclens/Peridex ordered [x ] Yes  [ ] No  Intraop on Hold: PRBCs, CXR, SAMUEL [x ]   Consent obtained  [x ] Yes  [ ] No

## 2024-10-01 NOTE — H&P ADULT - NSHPLABSRESULTS_GEN_ALL_CORE
8.8    18.48 )-----------( 211      ( 01 Oct 2024 03:01 )             27.0     10-01    133[L]  |  99  |  21  ----------------------------<  217[H]  4.0   |  16[L]  |  1.29    Ca    8.3[L]      01 Oct 2024 03:01  Phos  4.3     10-01  Mg     1.7     10-01    TPro  7.2  /  Alb  3.3  /  TBili  0.5  /  DBili  x   /  AST  85[H]  /  ALT  48[H]  /  AlkPhos  118  10-01

## 2024-10-01 NOTE — ED ADULT NURSE NOTE - NSFALLHARMRISKINTERV_ED_ALL_ED

## 2024-10-01 NOTE — H&P ADULT - ASSESSMENT
Assessment:  75 y/o female w/ PMH of hypothyroidism, CVA x2 (2018), HTN, HLD and DM who initially p/t [place] w/ worsening back pain, SOB, N/V and CP, transferred to [facility] for c/f STEMI/NSTEMI underwent R/LHC and found to have severe LM and LAD disease and moderate pRCA disease. IABP placed for coronary perfusion and admitted to CICU for CABG eval.    Plan:    NEURO  # Stroke x2 (2018) w/ residual right sided weakness  - A+Ox  - Monitor mental status per protocol    PULM  - SpO2 WNL on room air    CV  # STEMI/Multivessal CAD (LM, Cx and RCA disease)  # HLD  - s/p R/LHC 10/1, severe LM, Cx disease and moderate RCA disease  - c/w IABP 1:1, w/ heparin gtt  - c/w ASA  - Holding brilinta for CABG eval, f/u P2Y12  - f/u TTE  - CI WNL  - CVP 12 > lasix 40 given, trend  - Lactate negative    # HTN  - Holding for now, pt. hypotensive prior to cath    GI  # N/V  - NPO for now  - Abdominal x-ray ordered but most likely anginal  - s/p zofran and reglan w/ relief    /RENAL  - No acute issues  - Trend BMP, lytes   - Strict I/Os    ENDO  # DM  - On 46U lantus at home  - mISS for now  - f/u a1c    # Hypothyroidism  - c/w synthroid  - f/u TSH    HEME  - c/w heparin gtt for IABP and VTE ppx  - Trend CBC, coags per protocol    ID  # Leukocytosis  - Most likely reactive iso STEMI  - Afebrile  - Trend WBC    Patient requires continuous monitoring with bedside rhythm monitoring, pulse ox monitoring, and intermittent blood gas analysis. Care plan discussed with ICU care team. Patient remained critical and at risk for life threatening decompensation.  Patient seen, examined and plan discussed with CCU team during rounds.     I have personally provided 75 minutes of critical care time excluding time spent on separate procedures, in addition to initial critical care time provided by the CICU Attending, Dr. Steiner.    Brittany Vázquez, [facility]  Assessment:  73 y/o female w/ PMH of hypothyroidism, CVA x2 (2018), HTN, HLD and DM who initially p/t [place] w/ worsening back pain, SOB, N/V and CP, transferred to [facility] for c/f STEMI/NSTEMI underwent R/LHC and found to have severe LM and LAD disease and moderate pRCA disease. IABP placed for coronary perfusion and admitted to CICU for CABG eval.    Plan:    NEURO  # Stroke x2 (2018) w/ ?residual right sided weakness  - A+Ox3  - Monitor mental status per protocol  - Ambulates independently w/ cane at home    PULM  - SpO2 WNL on 2L nasal cannula    CV  # STEMI/Multivessal CAD (LM, Cx and RCA disease)  # HLD  - s/p R/LHC 10/1, severe LM, Cx disease and moderate RCA disease  - c/w IABP 1:1, w/ heparin gtt  - c/w ASA  - Holding brilinta for CABG eval, f/u P2Y12  - f/u TTE  - CI WNL  - CVP 12 > lasix 40 given, trend  - Lactate negative    # HTN  - Holding for now, pt. hypotensive prior to cath    GI  # N/V  - NPO for now  - Abdominal x-ray ordered but most likely anginal  - s/p zofran and reglan w/ relief    /RENAL  - No acute issues  - Trend BMP, lytes   - Strict I/Os    ENDO  # DM  - On 46U lantus at home  - mISS for now  - f/u a1c    # Hypothyroidism  - c/w synthroid  - f/u TSH    HEME  - c/w heparin gtt for IABP and VTE ppx  - Trend CBC, coags per protocol    ID  # Leukocytosis  - Most likely reactive iso STEMI  - Afebrile  - Trend WBC    Patient requires continuous monitoring with bedside rhythm monitoring, pulse ox monitoring, and intermittent blood gas analysis. Care plan discussed with ICU care team. Patient remained critical and at risk for life threatening decompensation.  Patient seen, examined and plan discussed with CCU team during rounds.     I have personally provided 75 minutes of critical care time excluding time spent on separate procedures, in addition to initial critical care time provided by the CICU Attending, Dr. Steiner.    Brittany Vázquez, [facility]

## 2024-10-01 NOTE — H&P ADULT - NSICDXPASTMEDICALHX_GEN_ALL_CORE_FT
PAST MEDICAL HISTORY:  Diabetes     Hyperlipidemia     Hypertension     Hypothyroidism     Stroke

## 2024-10-01 NOTE — CONSULT NOTE ADULT - ASSESSMENT
This is a 73 y/o F with PMH of hypothyroidism, CVA x2 (residual R sided weakness), and HTN who presented as a transfer from [facility] due to back pain 8/10 in severity (worsening), as well as SOB, nonproductive cough, and associated chest pain with cough since Friday, 09/27/2024. She was transferred as a Tier I transfer from ER to ER due to hypotension to 80s/50s (MAP ~65), with HR 90s-100s, on room air saturating >94%. During transport, she was given ~800cc IVF. HsTn in ER here 1,194. BNP 13,052. Lactic 1.9 with VBG pH 7.28, pCo2 40, HCO3 19. ECG with ST depressions I, II, aVL, V2-V5. ST elevations aVR, III, V1.     S/p ASA 324mg, Brilinta 180mg BID, Heparin bolus and gtt at [facility]. WBC 22.91 (87% neutrophils), Hgb 9.3, Plt 250K. BUN 19, Cr 1.28.     Saturating >94% on room air. B lines by ER POCUS on arrival. Given 40mg IV lasix. Started on HFNC with tachypnea to high 30s and discomfort for LHC/RHC. Limited TTE with moderate MR, no estimation of LVEF able to be determined due to patient discomfort, vomiting, and body habitus.     Cath lab activated per Dr. Vergara.     #STEMI:   #Hypoxia:   #SOB:   - Bedside TTE with no clear pericardial effusion  - BNP 13,052, Trop T 1194, ECG with diffuse ST depressions and ST elevations aVR and III, V1  [] Activate cath lab  [] Careful monitoring of respiratory status, low threshold for intubation if hypoxic  [] Admit to CICU   [] Formal TTE in AM   [] [place] in cath lab; will need aggressive diuresis for volume status optimization    Patient discussed with Dr. Jai Adorno MD   Cardiology Fellow This is a 75 y/o F with PMH of hypothyroidism, CVA x2 (residual R sided weakness), and HTN who presented as a transfer from [facility] due to back pain 8/10 in severity (worsening), as well as SOB, nonproductive cough, and associated chest pain with cough since Friday, 09/27/2024. She was transferred as a Tier I transfer from ER to ER due to hypotension to 80s/50s (MAP ~65), with HR 90s-100s, on room air saturating >94%. During transport, she was given ~800cc IVF. HsTn in ER here 1,194. BNP 13,052. Lactic 1.9 with VBG pH 7.28, pCo2 40, HCO3 19. ECG with ST depressions I, II, aVL, V2-V5. ST elevations aVR, III, V1.     S/p ASA 324mg, Brilinta 180mg BID, Heparin bolus and gtt at [facility]. WBC 22.91 (87% neutrophils), Hgb 9.3, Plt 250K. BUN 19, Cr 1.28.     Saturating >94% on room air. B lines by ER POCUS on arrival. Given 40mg IV lasix. Started on HFNC with tachypnea to high 30s and discomfort for LHC/RHC. Limited TTE with moderate MR, no estimation of LVEF able to be determined due to patient discomfort, vomiting, and body habitus.     Cath lab activated per Dr. Vergara for NSTEMI.     #NSTEMI:   #Hypoxia:   #SOB:   - Severe multivessel disease, including LM and LAD on LHC  - Bedside TTE with no clear pericardial effusion  - BNP 13,052, Trop T 1194, ECG with diffuse ST depressions and ST elevations aVR and III, V1  [] Activate cath lab  [] Careful monitoring of respiratory status, low threshold for intubation if hypoxic  [] Admit to CICU   [] Formal TTE in AM   [] [place] in cath lab; will need aggressive diuresis for volume status optimization    Patient discussed with Dr. Jai Adorno MD   Cardiology Fellow

## 2024-10-01 NOTE — H&P ADULT - NSHPPHYSICALEXAM_GEN_ALL_CORE
ICU Vital Signs Last 24 Hrs  T(C): 36.9 (01 Oct 2024 02:15), Max: 36.9 (01 Oct 2024 02:15)  T(F): 98.4 (01 Oct 2024 02:15), Max: 98.4 (01 Oct 2024 02:15)  HR: 86 (01 Oct 2024 03:02) (86 - 112)  BP: 120/82 (01 Oct 2024 02:15) (87/46 - 120/82)  BP(mean): 97 (01 Oct 2024 02:15) (97 - 97)  ABP: --  ABP(mean): --  RR: 25 (01 Oct 2024 03:00) (20 - 25)  SpO2: 94% (01 Oct 2024 03:02) (94% - 100%)    O2 Parameters below as of 01 Oct 2024 03:02  Patient On (Oxygen Delivery Method): room air    General: Well nourished, well developed, NAD  Neurology/Psychiatric: A&Ox3, nonfocal, LANTIGUA x 4. Mood and affect appropriate for situation  Respiratory: Breath sounds CTA in all lung fields  CV: RRR, S1, S2  Abdominal: Soft, non-tender, non-distended  Extremities: No peripheral edema, 1+ peripheral pulses  Skin: No rashes  Lines/Tubes: Right femoral IABP and PAC CDI

## 2024-10-01 NOTE — ED ADULT NURSE NOTE - OBJECTIVE STATEMENT
Pt is 74y F with PMH HTN transfer from [place] for chest pain. Pt reports chest pain radiating to back x 3 days, with worsening SOB. Reported to [facility], where she was given 324mg ASA, 180mg brilinta, 5000 mg heparin bolus, and started on heparin gtt 9.3 ml/hr at 2335. Pt was also hypotensive 80s/40s at [facility], was given 700cc mg of NS and 4mg zofran en route by EMS. Upon assessment, A&Ox4, Arabic speaking, pale, sinus tach 112bpm, tachypneic SpO2 96% on 2LNC, BP 90s/60s. NS bolus paused as per cardiology MD Adorno. Cardiology at bedside.

## 2024-10-01 NOTE — H&P ADULT - HISTORY OF PRESENT ILLNESS
75 y/o female w/ PMH of hypothyroidism, CVA x2 (2018), HTN, HLD and DM who initially p/t [place] w/ worsening back pain, SOB, N/V and CP. She was transferred as a Tier I w/ c/f NSTEMI and hypotension (SBP 80s). Her son was at bedside and says she it typically mobile with her walker and has never had such back pain or CP. ECG with ST depressions I, II, aVL, V2-V5 and ST elevations aVR, III, V1. She was ASA and brilinta and heparin loaded at OSH. Pt. underwent R/LHC found to have severe LM and LAD disease and moderate pRCA disease. IABP placed for coronary perfusion and admitted to CICU for CABG eval. On arrival to unit pt. endorses nausea and had one episode of emesis relieved w/ antiemetics.

## 2024-10-01 NOTE — CONSULT NOTE ADULT - ASSESSMENT
75 y/o [place] speaking female w/ PMH of hypothyroidism, CVA x2 (2018), HTN, HLD and DM who initially p/t [facility]'s w/ worsening back pain, SOB, N/V and CP. She was transferred as a Tier I w/ c/f NSTEMI and hypotension (SBP 80s). Her son was at bedside and says she it typically mobile with her walker and has never had such back pain or CP. ECG with ST depressions I, II, aVL, V2-V5 and ST elevations aVR, III, V1. She was ASA and brilinta and heparin loaded at OSH. Pt. underwent R/LHC found to have severe LM and LAD disease and moderate pRCA disease. IABP placed for coronary perfusion and admitted to CICU for CABG eval. On arrival to unit pt. endorses nausea and had one episode of emesis relieved w/ antiemetics. CT Surgery consulted for CABG evaluation.

## 2024-10-01 NOTE — PROGRESS NOTE ADULT - SUBJECTIVE AND OBJECTIVE BOX
ISAC SANDOVAL  MRN-[id number]  Patient is a 74y old  Female who presents with a chief complaint of CP (01 Oct 2024 09:23)    HPI: 74F PMH of hypothyroidism, CVA x2 (2018), HTN, HLD and DM who initially p/t [place]'s w/ worsening back pain, SOB, N/V and CP. She was transferred as a Tier I w/ c/f NSTEMI and hypotension (SBP 80s). Her son was at bedside and says she it typically mobile with her walker and has never had such back pain or CP. ECG with ST depressions I, II, aVL, V2-V5 and ST elevations aVR, III, V1. She was ASA and brilinta and heparin loaded at OSH. Pt. underwent R/LHC found to have severe LM and LAD disease and moderate pRCA disease. IABP placed for coronary perfusion and admitted to CICU for CABG eval. On arrival to unit pt. endorses nausea and had one episode of emesis relieved w/ antiemetics. (01 Oct 2024 03:06)    REVIEW OF SYSTEMS:  CONSTITUTIONAL: No weakness, fevers or chills  EYES/ENT: No visual changes;  No vertigo or throat pain   NECK: No pain or stiffness  RESPIRATORY: No cough, wheezing, hemoptysis; No shortness of breath  CARDIOVASCULAR: No chest pain or palpitations  GASTROINTESTINAL: No abdominal or epigastric pain  No nausea, vomiting, or hematemesis; No diarrhea or constipation. No melena or hematochezia.  GENITOURINARY: No dysuria, frequency or hematuria  NEUROLOGICAL: No numbness or weakness  SKIN: No itching, rashes    ICU Vital Signs Last 24 Hrs  T(C): 37.4 (01 Oct 2024 19:00), Max: 37.4 (01 Oct 2024 19:00)  T(F): 99.3 (01 Oct 2024 19:00), Max: 99.3 (01 Oct 2024 19:00)  HR: 95 (01 Oct 2024 19:00) (76 - 112)  BP: 120/82 (01 Oct 2024 02:15) (87/46 - 120/82)  BP(mean): 97 (01 Oct 2024 02:15) (97 - 97)  RR: 25 (01 Oct 2024 19:00) (20 - 49)  SpO2: 99% (01 Oct 2024 19:00) (93% - 100%)    O2 Parameters below as of 01 Oct 2024 19:00  Patient On (Oxygen Delivery Method): room air    CVP(mm Hg): 3 (10-01-24 @ 13:00) (1 - 297)  PA: 30/14 (10-01-24 @ 13:00) (25/8 - 41/22)  PA(mean): 21 (10-01-24 @ 13:00) (15 - 31)      I&O's Summary    30 Sep 2024 07:  -  01 Oct 2024 07:00  --------------------------------------------------------  IN: 86 mL / OUT: 625 mL / NET: -539 mL    01 Oct 2024 07:  -  01 Oct 2024 19:28  --------------------------------------------------------  IN: 481 mL / OUT: 1030 mL / NET: -549 mL      CAPILLARY BLOOD GLUCOSE  POCT Blood Glucose.: 132 mg/dL (01 Oct 2024 18:14)  ============================I/O===========================   I&O's Detail    30 Sep 2024 07:01  -  01 Oct 2024 07:00  --------------------------------------------------------  IN:    Heparin: 36 mL    IV PiggyBack: 50 mL  Total IN: 86 mL    OUT:    Voided (mL): 625 mL  Total OUT: 625 mL    Total NET: -539 mL      01 Oct 2024 07:01  -  01 Oct 2024 19:28  --------------------------------------------------------  IN:    Heparin: 91 mL    IV PiggyBack: 30 mL    Oral Fluid: 360 mL  Total IN: 481 mL    OUT:    Indwelling Catheter - Urethral (mL): 950 mL    Voided (mL): 80 mL  Total OUT: 1030 mL    Total NET: -549 mL  ============================ LABS ========================                      8.2    17.83 )-----------( 193      ( 01 Oct 2024 11:29 )             25.0     10    133[L]  |  99  |  21  ----------------------------<  217[H]  4.0   |  16[L]  |  1.29    Ca    8.3[L]      01 Oct 2024 03:01  Phos  4.3     10-  Mg     1.7     10-01    TPro  7.2  /  Alb  3.3  /  TBili  0.5  /  DBili  x   /  AST  85[H]  /  ALT  48[H]  /  AlkPhos  118  10-01    Troponin T, High Sensitivity Result: 1194 ng/L (10-01-24 @ 00:37)    P2Y12 Plt Reactivity: 181 PRU (10-01-24 @ 04:41)    LIVER FUNCTIONS - ( 01 Oct 2024 03:01 )  Alb: 3.3 g/dL / Pro: 7.2 g/dL / ALK PHOS: 118 U/L / ALT: 48 U/L / AST: 85 U/L / GGT: x           PT/INR - ( 01 Oct 2024 03:01 )   PT: 13.9 sec;   INR: 1.22 ratio    PTT - ( 01 Oct 2024 11:30 )  PTT:37.7 sec    Blood Gas Venous - Lactate: 1.6 mmol/L (10-01-24 @ 02:53)  Blood Gas Venous - Lactate: 1.9 mmol/L (10-01-24 @ 00:07)    Urinalysis Basic - ( 01 Oct 2024 12:42 )    Color: Yellow / Appearance: Clear / S.021 / pH: x  Gluc: x / Ketone: Trace mg/dL  / Bili: Negative / Urobili: 1.0 mg/dL   Blood: x / Protein: Negative mg/dL / Nitrite: Negative   Leuk Esterase: Small / RBC: 5 /HPF / WBC 15 /HPF   Sq Epi: x / Non Sq Epi: 1 /HPF / Bacteria: Few /HPF  ======================Micro/Rad/Cardio=================  Telemetry: Reviewed   EKG: Reviewed  CXR: Reviewed  ======================================================  PAST MEDICAL & SURGICAL HISTORY:  Hypothyroidism      Hypertension      Hyperlipidemia      Stroke      Diabetes      S/P cholecystectomy    A/P: 74F PMH of hypothyroidism, CVA x2 (2018), HTN, HLD and DM who initially p/t [place]'s w/ worsening back pain, SOB, N/V and CP, transferred to [facility] for c/f STEMI/NSTEMI underwent R/LHC and found to have severe LM and LAD disease and moderate pRCA disease. IABP placed, pending CABG.    Plan:    NEURO  # Stroke x2 (2018) w/ ?residual right sided weakness  - A+Ox3  - Monitor mental status per protocol  - Ambulates independently w/ cane at home    PULM  - SpO2 WNL on 2L nasal cannula    CV  # STEMI/Multivessal CAD (LM, Cx and RCA disease)  # HLD  - s/p R/LHC 10/1, severe LM, Cx disease and moderate RCA disease  - c/w IABP 1:1, w/ heparin gtt  - c/w ASA, holding brilinta   - CVP 12 > lasix 40 given, trend  - GDMT: Lopressor 25 BID  - NPO after MN: CABG tomorrow     # HTN  - Lopressor 25 BID    GI  # N/V  - NPO for now  - Abdominal x-ray ordered but most likely anginal  - s/p zofran and reglan w/ relief    /RENAL  - No acute issues  - Trend BMP, lytes   - Strict I/Os    ENDO  # DM  - On 46U lantus at home  - mISS for now  - f/u a1c    # Hypothyroidism  - c/w synthroid  - f/u TSH    HEME  - c/w heparin gtt for IABP and VTE ppx  - Trend CBC, coags per protocol    ID  # Leukocytosis  - Most likely reactive iso STEMI  - Afebrile  - Trend WBC  ======================= DISPOSITION  =====================  [X] Critical   [ ] Guarded    [ ] Stable    [X] Maintain in CICU  [ ] Downgrade to Telemetry  [ ] Discharge Home    Patient requires continuous monitoring with bedside rhythm monitoring, pulse ox monitoring, and intermittent blood gas analysis. Care plan discussed with ICU care team. Patient remained critical and at risk for life threatening decompensation.  Patient seen, examined and plan discussed with CCU team during rounds.     I have personally provided 30 minutes of critical care time excluding time spent on separate procedures, in addition to initial critical care time provided by the CICU Attending, Dr. Jatin Mack [facility]  CICU [phone]

## 2024-10-02 ENCOUNTER — TRANSCRIPTION ENCOUNTER (OUTPATIENT)
Age: 74
End: 2024-10-02

## 2024-10-02 PROBLEM — I10 ESSENTIAL (PRIMARY) HYPERTENSION: Chronic | Status: ACTIVE | Noted: 2024-10-01

## 2024-10-02 PROBLEM — I63.9 CEREBRAL INFARCTION, UNSPECIFIED: Chronic | Status: ACTIVE | Noted: 2024-10-01

## 2024-10-02 PROBLEM — E78.5 HYPERLIPIDEMIA, UNSPECIFIED: Chronic | Status: ACTIVE | Noted: 2024-10-01

## 2024-10-02 PROBLEM — E03.9 HYPOTHYROIDISM, UNSPECIFIED: Chronic | Status: ACTIVE | Noted: 2024-10-01

## 2024-10-02 PROBLEM — E11.9 TYPE 2 DIABETES MELLITUS WITHOUT COMPLICATIONS: Chronic | Status: ACTIVE | Noted: 2024-10-01

## 2024-10-02 LAB
ALBUMIN SERPL ELPH-MCNC: 3.2 G/DL — LOW (ref 3.3–5)
ALP SERPL-CCNC: 129 U/L — HIGH (ref 40–120)
ALT FLD-CCNC: 49 U/L — HIGH (ref 10–45)
ANION GAP SERPL CALC-SCNC: 18 MMOL/L — HIGH (ref 5–17)
APTT BLD: 51.2 SEC — HIGH (ref 24.5–35.6)
APTT BLD: 57.8 SEC — HIGH (ref 24.5–35.6)
APTT BLD: 61.2 SEC — HIGH (ref 24.5–35.6)
AST SERPL-CCNC: 90 U/L — HIGH (ref 10–40)
BASOPHILS # BLD AUTO: 0.03 K/UL — SIGNIFICANT CHANGE UP (ref 0–0.2)
BASOPHILS NFR BLD AUTO: 0.2 % — SIGNIFICANT CHANGE UP (ref 0–2)
BILIRUB SERPL-MCNC: 0.5 MG/DL — SIGNIFICANT CHANGE UP (ref 0.2–1.2)
BUN SERPL-MCNC: 24 MG/DL — HIGH (ref 7–23)
CALCIUM SERPL-MCNC: 8.4 MG/DL — SIGNIFICANT CHANGE UP (ref 8.4–10.5)
CHLORIDE SERPL-SCNC: 98 MMOL/L — SIGNIFICANT CHANGE UP (ref 96–108)
CO2 SERPL-SCNC: 20 MMOL/L — LOW (ref 22–31)
CREAT SERPL-MCNC: 1.2 MG/DL — SIGNIFICANT CHANGE UP (ref 0.5–1.3)
EGFR: 48 ML/MIN/1.73M2 — LOW
EOSINOPHIL # BLD AUTO: 0 K/UL — SIGNIFICANT CHANGE UP (ref 0–0.5)
EOSINOPHIL NFR BLD AUTO: 0 % — SIGNIFICANT CHANGE UP (ref 0–6)
GLUCOSE BLDC GLUCOMTR-MCNC: 160 MG/DL — HIGH (ref 70–99)
GLUCOSE BLDC GLUCOMTR-MCNC: 167 MG/DL — HIGH (ref 70–99)
GLUCOSE BLDC GLUCOMTR-MCNC: 171 MG/DL — HIGH (ref 70–99)
GLUCOSE BLDC GLUCOMTR-MCNC: 171 MG/DL — HIGH (ref 70–99)
GLUCOSE BLDC GLUCOMTR-MCNC: 172 MG/DL — HIGH (ref 70–99)
GLUCOSE SERPL-MCNC: 152 MG/DL — HIGH (ref 70–99)
HCT VFR BLD CALC: 26.8 % — LOW (ref 34.5–45)
HGB BLD-MCNC: 8.6 G/DL — LOW (ref 11.5–15.5)
IMM GRANULOCYTES NFR BLD AUTO: 0.8 % — SIGNIFICANT CHANGE UP (ref 0–0.9)
LYMPHOCYTES # BLD AUTO: 12.5 % — LOW (ref 13–44)
LYMPHOCYTES # BLD AUTO: 2.33 K/UL — SIGNIFICANT CHANGE UP (ref 1–3.3)
MAGNESIUM SERPL-MCNC: 2.2 MG/DL — SIGNIFICANT CHANGE UP (ref 1.6–2.6)
MCHC RBC-ENTMCNC: 26.6 PG — LOW (ref 27–34)
MCHC RBC-ENTMCNC: 32.1 GM/DL — SIGNIFICANT CHANGE UP (ref 32–36)
MCV RBC AUTO: 83 FL — SIGNIFICANT CHANGE UP (ref 80–100)
MONOCYTES # BLD AUTO: 1.56 K/UL — HIGH (ref 0–0.9)
MONOCYTES NFR BLD AUTO: 8.4 % — SIGNIFICANT CHANGE UP (ref 2–14)
NEUTROPHILS # BLD AUTO: 14.58 K/UL — HIGH (ref 1.8–7.4)
NEUTROPHILS NFR BLD AUTO: 78.1 % — HIGH (ref 43–77)
NRBC # BLD: 0 /100 WBCS — SIGNIFICANT CHANGE UP (ref 0–0)
PA ADP PRP-ACNC: 265 PRU — SIGNIFICANT CHANGE UP (ref 182–335)
PHOSPHATE SERPL-MCNC: 3.5 MG/DL — SIGNIFICANT CHANGE UP (ref 2.5–4.5)
PLATELET # BLD AUTO: 201 K/UL — SIGNIFICANT CHANGE UP (ref 150–400)
POTASSIUM SERPL-MCNC: 3.7 MMOL/L — SIGNIFICANT CHANGE UP (ref 3.5–5.3)
POTASSIUM SERPL-SCNC: 3.7 MMOL/L — SIGNIFICANT CHANGE UP (ref 3.5–5.3)
PROT SERPL-MCNC: 7.2 G/DL — SIGNIFICANT CHANGE UP (ref 6–8.3)
RBC # BLD: 3.23 M/UL — LOW (ref 3.8–5.2)
RBC # FLD: 14.8 % — HIGH (ref 10.3–14.5)
SODIUM SERPL-SCNC: 136 MMOL/L — SIGNIFICANT CHANGE UP (ref 135–145)
WBC # BLD: 18.64 K/UL — HIGH (ref 3.8–10.5)
WBC # FLD AUTO: 18.64 K/UL — HIGH (ref 3.8–10.5)

## 2024-10-02 PROCEDURE — 93306 TTE W/DOPPLER COMPLETE: CPT | Mod: 26

## 2024-10-02 PROCEDURE — 94010 BREATHING CAPACITY TEST: CPT | Mod: 26

## 2024-10-02 PROCEDURE — 71045 X-RAY EXAM CHEST 1 VIEW: CPT | Mod: 26

## 2024-10-02 PROCEDURE — 99291 CRITICAL CARE FIRST HOUR: CPT

## 2024-10-02 PROCEDURE — 93010 ELECTROCARDIOGRAM REPORT: CPT | Mod: 76

## 2024-10-02 RX ORDER — INSULIN GLARGINE 300 U/ML
46 INJECTION, SOLUTION SUBCUTANEOUS
Refills: 0 | DISCHARGE

## 2024-10-02 RX ORDER — GUAIFENESIN 100 MG/5ML
200 SOLUTION ORAL ONCE
Refills: 0 | Status: COMPLETED | OUTPATIENT
Start: 2024-10-02 | End: 2024-10-02

## 2024-10-02 RX ORDER — CHLORHEXIDINE GLUCONATE ORAL RINSE 1.2 MG/ML
1 SOLUTION DENTAL ONCE
Refills: 0 | Status: COMPLETED | OUTPATIENT
Start: 2024-10-02 | End: 2024-10-02

## 2024-10-02 RX ORDER — INSULIN LISPRO 100/ML
VIAL (ML) SUBCUTANEOUS AT BEDTIME
Refills: 0 | Status: DISCONTINUED | OUTPATIENT
Start: 2024-10-02 | End: 2024-10-03

## 2024-10-02 RX ORDER — INSULIN LISPRO 100/ML
VIAL (ML) SUBCUTANEOUS
Refills: 0 | Status: DISCONTINUED | OUTPATIENT
Start: 2024-10-02 | End: 2024-10-03

## 2024-10-02 RX ORDER — FUROSEMIDE 10 MG/ML
40 INJECTION INTRAVENOUS ONCE
Refills: 0 | Status: COMPLETED | OUTPATIENT
Start: 2024-10-02 | End: 2024-10-02

## 2024-10-02 RX ORDER — CHLORHEXIDINE GLUCONATE ORAL RINSE 1.2 MG/ML
30 SOLUTION DENTAL ONCE
Refills: 0 | Status: DISCONTINUED | OUTPATIENT
Start: 2024-10-02 | End: 2024-10-03

## 2024-10-02 RX ORDER — BENZOCAINE AND LEVOMENTHOL 200; 5 MG/G; MG/G
1 SPRAY TOPICAL ONCE
Refills: 0 | Status: COMPLETED | OUTPATIENT
Start: 2024-10-02 | End: 2024-10-02

## 2024-10-02 RX ORDER — ACETAMINOPHEN 325 MG
1000 TABLET ORAL ONCE
Refills: 0 | Status: COMPLETED | OUTPATIENT
Start: 2024-10-02 | End: 2024-10-02

## 2024-10-02 RX ORDER — CEFTRIAXONE SODIUM 1 G
1000 VIAL (EA) INJECTION EVERY 24 HOURS
Refills: 0 | Status: DISCONTINUED | OUTPATIENT
Start: 2024-10-02 | End: 2024-10-03

## 2024-10-02 RX ADMIN — Medication 25 MILLIGRAM(S): at 17:11

## 2024-10-02 RX ADMIN — BENZOCAINE AND LEVOMENTHOL 1 LOZENGE: 200; 5 SPRAY TOPICAL at 14:39

## 2024-10-02 RX ADMIN — Medication 25 MILLIGRAM(S): at 05:19

## 2024-10-02 RX ADMIN — Medication 2: at 11:55

## 2024-10-02 RX ADMIN — Medication 50 MILLIGRAM(S): at 21:16

## 2024-10-02 RX ADMIN — MUPIROCIN 1 APPLICATION(S): 20 OINTMENT TOPICAL at 05:53

## 2024-10-02 RX ADMIN — Medication 400 MILLIGRAM(S): at 08:50

## 2024-10-02 RX ADMIN — Medication 1000 MILLIGRAM(S): at 09:20

## 2024-10-02 RX ADMIN — Medication 12.5 UNIT(S)/HR: at 00:06

## 2024-10-02 RX ADMIN — GUAIFENESIN 200 MILLIGRAM(S): 100 SOLUTION ORAL at 12:23

## 2024-10-02 RX ADMIN — Medication 2: at 05:19

## 2024-10-02 RX ADMIN — Medication 75 MICROGRAM(S): at 05:14

## 2024-10-02 RX ADMIN — CHLORHEXIDINE GLUCONATE ORAL RINSE 1 APPLICATION(S): 1.2 SOLUTION DENTAL at 21:16

## 2024-10-02 RX ADMIN — CHLORHEXIDINE GLUCONATE ORAL RINSE 1 APPLICATION(S): 1.2 SOLUTION DENTAL at 05:14

## 2024-10-02 RX ADMIN — Medication 81 MILLIGRAM(S): at 11:55

## 2024-10-02 RX ADMIN — Medication 100 MILLIGRAM(S): at 08:50

## 2024-10-02 RX ADMIN — Medication 2: at 00:50

## 2024-10-02 RX ADMIN — Medication 2: at 17:12

## 2024-10-02 RX ADMIN — Medication 13.5 UNIT(S)/HR: at 13:30

## 2024-10-02 RX ADMIN — ATORVASTATIN CALCIUM 80 MILLIGRAM(S): 10 TABLET, FILM COATED ORAL at 21:15

## 2024-10-02 RX ADMIN — Medication 40 MILLIEQUIVALENT(S): at 05:13

## 2024-10-02 RX ADMIN — Medication 13.5 UNIT(S)/HR: at 05:54

## 2024-10-02 RX ADMIN — FUROSEMIDE 40 MILLIGRAM(S): 10 INJECTION INTRAVENOUS at 00:46

## 2024-10-02 NOTE — PRE PROCEDURE NOTE - PRE PROCEDURE EVALUATION
Cardiac Surgery Pre-op Note:    CC: Patient is a 74y old  Female who presents with a chief complaint of CP underwent cardiac cath IABP placed now for CABG in am 10/3  with Dr. Martini      Referring Physician: Dr Steiner                                                                                                           Surgeon: DR Martini  CABG     Procedure: CABG 10/3    Allergies    No Known Allergies    Intolerances        HPI:  75 y/o female w/ PMH of hypothyroidism, CVA x2 (2018), HTN, HLD and DM who initially p/t [facility] w/ worsening back pain, SOB, N/V and CP. She was transferred as a Tier I w/ c/f NSTEMI and hypotension (SBP 80s). Her son was at bedside and says she it typically mobile with her walker and has never had such back pain or CP. ECG with ST depressions I, II, aVL, V2-V5 and ST elevations aVR, III, V1. She was ASA and brilinta and heparin loaded at OSH. Pt. underwent R/LHC found to have severe LM and LAD disease and moderate pRCA disease. IABP placed for coronary perfusion and admitted to CICU for CABG eval. On arrival to unit pt. endorses nausea and had one episode of emesis relieved w/ antiemetics. (01 Oct 2024 03:06)      PAST MEDICAL & SURGICAL HISTORY:  Hypothyroidism      Hypertension      Hyperlipidemia      Stroke      Diabetes      S/P cholecystectomy          MEDICATIONS  (STANDING):  acetaminophen     Tablet .. 1000 milliGRAM(s) Oral once  ascorbic acid 2000 milliGRAM(s) Oral once  aspirin  chewable 81 milliGRAM(s) Oral daily  atorvastatin 80 milliGRAM(s) Oral at bedtime  cefTRIAXone   IVPB 1000 milliGRAM(s) IV Intermittent every 24 hours  cefuroxime  IVPB 1500 milliGRAM(s) IV Intermittent once  chlorhexidine 0.12% Liquid 30 milliLiter(s) Swish and Spit once  chlorhexidine 2% Cloths 1 Application(s) Topical <User Schedule>  gabapentin 200 milliGRAM(s) Oral once  heparin  Infusion 900 Unit(s)/Hr (13.5 mL/Hr) IV Continuous <Continuous>  insulin lispro (ADMELOG) corrective regimen sliding scale   SubCutaneous three times a day before meals  insulin lispro (ADMELOG) corrective regimen sliding scale   SubCutaneous at bedtime  levothyroxine 75 MICROGram(s) Oral daily  metoprolol tartrate 25 milliGRAM(s) Oral two times a day  mupirocin 2% Ointment 1 Application(s) Both Nostrils two times a day  traZODone 50 milliGRAM(s) Oral at bedtime    MEDICATIONS  (PRN):    T(C): 37.7 (10-02-24 @ 10:00), Max: 38.2 (10-02-24 @ 08:00)  T(F): 99.9 (10-02-24 @ 10:00), Max: 100.8 (10-02-24 @ 08:00)  HR: 84 (10-02-24 @ 10:00) (78 - 98)  RR: 29 (10-02-24 @ 10:00) (18 - 33)  SpO2: 97% (10-02-24 @ 10:00) (94% - 100%)    Daily   175 cm  Daily Weight in k.8 (02 Oct 2024 00:00)      Labs:                        8.6    18.64 )-----------( 201      ( 02 Oct 2024 00:49 )             26.8     10-02    136  |  98  |  24[H]  ----------------------------<  152[H]  3.7   |  20[L]  |  1.20    Ca    8.4      02 Oct 2024 00:49  Phos  3.5     10-02  Mg     2.2     10-02    TPro  7.2  /  Alb  3.2[L]  /  TBili  0.5  /  DBili  x   /  AST  90[H]  /  ALT  49[H]  /  AlkPhos  129[H]  10-02    PT/INR - ( 01 Oct 2024 03:01 )   PT: 13.9 sec;   INR: 1.22 ratio         PTT - ( 02 Oct 2024 04:39 )  PTT:51.2 sec    Blood Type: ABO Interpretation: A (10-01 @ 03:06)    HGB A1C: 6.8  Thyroid Panel: 10-01 @ 11:30/--  1.8/8.1/82  10-01 @ 03:02/0.63  --/--/--    MRSA: MRSA PCR Result.: NotDetec (10-01 @ 12:42)   / MSSA:   Urinalysis Basic - ( 02 Oct 2024 00:49 )    Color: x / Appearance: x / SG: x / pH: x  Gluc: 152 mg/dL / Ketone: x  / Bili: x / Urobili: x   Blood: x / Protein: x / Nitrite: x   Leuk Esterase: x / RBC: x / WBC x   Sq Epi: x / Non Sq Epi: x / Bacteria: x        CXR: < from: Xray Chest 1 View- PORTABLE-Urgent (Xray Chest 1 View- PORTABLE-Urgent .) (10.01.24 @ 04:20) >  Intra-aortic balloon pump with tip 2 cm below the top of the the aortic   knob.  Inferior approach [place]-Sylvia catheter with tip at the right main pulmonary   artery.    The heart size is normal.  Bibasilar hazy interstitial opacities are unchanged.  There is no pneumothorax or pleural effusion.    IMPRESSION:  Lines as above.  No pneumothorax.    < end of copied text >  < from: [facility] Duplex Carotid, Bilat (10.01.24 @ 16:42) >  No significant hemodynamic stenosis of either carotid artery.    < end of copied text >  < from: TTE W or WO Ultrasound Enhancing Agent (10.01.24 @ 00:38) >  CONCLUSIONS:      1. Limited astudy, from which no conclusions can be drawn.   2. At least "moderate" mitral regurgitation.   3. Please obtain full wchocardiogram as soon as possible.    < end of copied text >           PFT's: pending      Cardiac catheterization: underwent R/LHC found to have severe LM and LAD disease and moderate pRCA disease. IABP placed for coronary perfusion      Gen: WN/WD NAD  Neuro: AAOx3, nonfocal  Pulm: CTA B/L  CV: RRR, S1S2  Abd: Soft, NT, ND +BS  Ext: No edema, + peripheral pulses  RT femoral IABP       Pt has AICD/PPM [ ] Yes  [x ] No             Brand Name:  Pre-op Beta Blocker ordered within 24 hrs of surgery (CABG ONLY)?  [ ] Yes Metorpolol 25 BID  Type & Cross  [x ] Yes  [ ] No  NPO after Midnight [ x] Yes  [ ] No  Pre-op ABX ordered, to be taped on chart:  [ x] Yes  [ ] No     Hibiclens/Peridex ordered [x ] Yes  [ ] No  Intraop on Hold: PRBCs, CXR, SAMUEL [x ]   Consent obtained  [x ] Yes  [ ] No    Plan  NPO at midnight   Hibiclens wash tonight and am  UA + ceftriaxone   IVPB 1000 milliGRAM(s) IV Intermittent every 24 hours   Zinacef on call to OR taped to chart   Peridex rinse and swish in am   CABG in am 10/3  with Dr Martini     Cardiac Surgery Pre-op Note:    CC: Patient is a 74y old  Female who presents with a chief complaint of CP underwent cardiac cath IABP placed now for CABG in am 10/3  with Dr. Martini      Referring Physician: Dr Steiner                                                                                                           Surgeon: DR Martini  CABG     Procedure: CABG 10/3    Allergies    No Known Allergies    Intolerances        HPI:  73 y/o female w/ PMH of hypothyroidism, CVA x2 (2018), HTN, HLD and DM who initially p/t [facility] w/ worsening back pain, SOB, N/V and CP. She was transferred as a Tier I w/ c/f NSTEMI and hypotension (SBP 80s). Her son was at bedside and says she it typically mobile with her walker and has never had such back pain or CP. ECG with ST depressions I, II, aVL, V2-V5 and ST elevations aVR, III, V1. She was ASA and brilinta and heparin loaded at OSH. Pt. underwent R/LHC found to have severe LM and LAD disease and moderate pRCA disease. IABP placed for coronary perfusion and admitted to CICU for CABG eval. On arrival to unit pt. endorses nausea and had one episode of emesis relieved w/ antiemetics. (01 Oct 2024 03:06)      PAST MEDICAL & SURGICAL HISTORY:  Hypothyroidism      Hypertension      Hyperlipidemia      Stroke      Diabetes      S/P cholecystectomy          MEDICATIONS  (STANDING):  acetaminophen     Tablet .. 1000 milliGRAM(s) Oral once  ascorbic acid 2000 milliGRAM(s) Oral once  aspirin  chewable 81 milliGRAM(s) Oral daily  atorvastatin 80 milliGRAM(s) Oral at bedtime  cefTRIAXone   IVPB 1000 milliGRAM(s) IV Intermittent every 24 hours  cefuroxime  IVPB 1500 milliGRAM(s) IV Intermittent once  chlorhexidine 0.12% Liquid 30 milliLiter(s) Swish and Spit once  chlorhexidine 2% Cloths 1 Application(s) Topical <User Schedule>  gabapentin 200 milliGRAM(s) Oral once  heparin  Infusion 900 Unit(s)/Hr (13.5 mL/Hr) IV Continuous <Continuous>  insulin lispro (ADMELOG) corrective regimen sliding scale   SubCutaneous three times a day before meals  insulin lispro (ADMELOG) corrective regimen sliding scale   SubCutaneous at bedtime  levothyroxine 75 MICROGram(s) Oral daily  metoprolol tartrate 25 milliGRAM(s) Oral two times a day  mupirocin 2% Ointment 1 Application(s) Both Nostrils two times a day  traZODone 50 milliGRAM(s) Oral at bedtime    MEDICATIONS  (PRN):    T(C): 37.7 (10-02-24 @ 10:00), Max: 38.2 (10-02-24 @ 08:00)  T(F): 99.9 (10-02-24 @ 10:00), Max: 100.8 (10-02-24 @ 08:00)  HR: 84 (10-02-24 @ 10:00) (78 - 98)  RR: 29 (10-02-24 @ 10:00) (18 - 33)  SpO2: 97% (10-02-24 @ 10:00) (94% - 100%)    Daily   175 cm  Daily Weight in k.8 (02 Oct 2024 00:00)      Labs:                        8.6    18.64 )-----------( 201      ( 02 Oct 2024 00:49 )             26.8     10-02    136  |  98  |  24[H]  ----------------------------<  152[H]  3.7   |  20[L]  |  1.20    Ca    8.4      02 Oct 2024 00:49  Phos  3.5     10-02  Mg     2.2     10-02    TPro  7.2  /  Alb  3.2[L]  /  TBili  0.5  /  DBili  x   /  AST  90[H]  /  ALT  49[H]  /  AlkPhos  129[H]  10-02    PT/INR - ( 01 Oct 2024 03:01 )   PT: 13.9 sec;   INR: 1.22 ratio         PTT - ( 02 Oct 2024 04:39 )  PTT:51.2 sec    Blood Type: ABO Interpretation: A (10-01 @ 03:06)    P2Y12  265  HGB A1C: 6.8  Thyroid Panel: 10-01 @ 11:30/--  1.8/8.1/82  10-01 @ 03:02/0.63  --/--/--    MRSA: MRSA PCR Result.: NotDetec (10-01 @ 12:42)   / MSSA:   Urinalysis Basic - ( 02 Oct 2024 00:49 )    Color: x / Appearance: x / SG: x / pH: x  Gluc: 152 mg/dL / Ketone: x  / Bili: x / Urobili: x   Blood: x / Protein: x / Nitrite: x   Leuk Esterase: x / RBC: x / WBC x   Sq Epi: x / Non Sq Epi: x / Bacteria: x        CXR: < from: Xray Chest 1 View- PORTABLE-Urgent (Xray Chest 1 View- PORTABLE-Urgent .) (10.01.24 @ 04:20) >  Intra-aortic balloon pump with tip 2 cm below the top of the the aortic   knob.  Inferior approach [place]-Sylvia catheter with tip at the right main pulmonary   artery.    The heart size is normal.  Bibasilar hazy interstitial opacities are unchanged.  There is no pneumothorax or pleural effusion.    IMPRESSION:  Lines as above.  No pneumothorax.    < end of copied text >  < from: VA Duplex Carotid, Bilat (10.01.24 @ 16:42) >  No significant hemodynamic stenosis of either carotid artery.    < end of copied text >  < from: TTE W or WO Ultrasound Enhancing Agent (10.01.24 @ 00:38) >  CONCLUSIONS:      1. Limited astudy, from which no conclusions can be drawn.   2. At least "moderate" mitral regurgitation.   3. Please obtain full wchocardiogram as soon as possible.    < end of copied text >           PFT's: pending      Cardiac catheterization: underwent R/LHC found to have severe LM and LAD disease and moderate pRCA disease. IABP placed for coronary perfusion      Gen: WN/WD NAD  Neuro: AAOx3, nonfocal  Pulm: CTA B/L  CV: RRR, S1S2  Abd: Soft, NT, ND +BS  Ext: No edema, + peripheral pulses  RT femoral IABP       Pt has AICD/PPM [ ] Yes  [x ] No             Brand Name:  Pre-op Beta Blocker ordered within 24 hrs of surgery (CABG ONLY)?  [ ] Yes Metorpolol 25 BID  Type & Cross  [x ] Yes  [ ] No  NPO after Midnight [ x] Yes  [ ] No  Pre-op ABX ordered, to be taped on chart:  [ x] Yes  [ ] No     Hibiclens/Peridex ordered [x ] Yes  [ ] No  Intraop on Hold: PRBCs, CXR, SAMUEL [x ]   Consent obtained  [x ] Yes  [ ] No    Plan  NPO at midnight   Hibiclens wash tonight and am  WBC 18   UA + ceftriaxone   IVPB 1000 milliGRAM(s) IV Intermittent every 24 hours   Zinacef on call to OR taped to chart   Peridex rinse and swish in am   CABG in am 10/3  with Dr Martini     Cardiac Surgery Pre-op Note:    CC: Patient is a 74y old  Female who presents with a chief complaint of CP underwent cardiac cath IABP placed now for CABG in am 10/3  with Dr. Martini      Referring Physician: Dr Steiner                                                                                                           Surgeon: DR Martini  CABG     Procedure: CABG 10/3    Allergies    No Known Allergies    Intolerances        HPI:  75 y/o female w/ PMH of hypothyroidism, CVA x2 (2018), HTN, HLD and DM who initially p/t [facility] w/ worsening back pain, SOB, N/V and CP. She was transferred as a Tier I w/ c/f NSTEMI and hypotension (SBP 80s). Her son was at bedside and says she it typically mobile with her walker and has never had such back pain or CP. ECG with ST depressions I, II, aVL, V2-V5 and ST elevations aVR, III, V1. She was ASA and brilinta and heparin loaded at OSH. Pt. underwent R/LHC found to have severe LM and LAD disease and moderate pRCA disease. IABP placed for coronary perfusion and admitted to CICU for CABG eval. On arrival to unit pt. endorses nausea and had one episode of emesis relieved w/ antiemetics. (01 Oct 2024 03:06)      PAST MEDICAL & SURGICAL HISTORY:  Hypothyroidism      Hypertension      Hyperlipidemia      Stroke      Diabetes      S/P cholecystectomy          MEDICATIONS  (STANDING):  acetaminophen     Tablet .. 1000 milliGRAM(s) Oral once  ascorbic acid 2000 milliGRAM(s) Oral once  aspirin  chewable 81 milliGRAM(s) Oral daily  atorvastatin 80 milliGRAM(s) Oral at bedtime  cefTRIAXone   IVPB 1000 milliGRAM(s) IV Intermittent every 24 hours  cefuroxime  IVPB 1500 milliGRAM(s) IV Intermittent once  chlorhexidine 0.12% Liquid 30 milliLiter(s) Swish and Spit once  chlorhexidine 2% Cloths 1 Application(s) Topical <User Schedule>  gabapentin 200 milliGRAM(s) Oral once  heparin  Infusion 900 Unit(s)/Hr (13.5 mL/Hr) IV Continuous <Continuous>  insulin lispro (ADMELOG) corrective regimen sliding scale   SubCutaneous three times a day before meals  insulin lispro (ADMELOG) corrective regimen sliding scale   SubCutaneous at bedtime  levothyroxine 75 MICROGram(s) Oral daily  metoprolol tartrate 25 milliGRAM(s) Oral two times a day  mupirocin 2% Ointment 1 Application(s) Both Nostrils two times a day  traZODone 50 milliGRAM(s) Oral at bedtime    MEDICATIONS  (PRN):    T(C): 37.7 (10-02-24 @ 10:00), Max: 38.2 (10-02-24 @ 08:00)  T(F): 99.9 (10-02-24 @ 10:00), Max: 100.8 (10-02-24 @ 08:00)  HR: 84 (10-02-24 @ 10:00) (78 - 98)  RR: 29 (10-02-24 @ 10:00) (18 - 33)  SpO2: 97% (10-02-24 @ 10:00) (94% - 100%)    Daily   175 cm  Daily Weight in k.8 (02 Oct 2024 00:00)      Labs:                        8.6    18.64 )-----------( 201      ( 02 Oct 2024 00:49 )             26.8     10-02    136  |  98  |  24[H]  ----------------------------<  152[H]  3.7   |  20[L]  |  1.20    Ca    8.4      02 Oct 2024 00:49  Phos  3.5     10-02  Mg     2.2     10-02    TPro  7.2  /  Alb  3.2[L]  /  TBili  0.5  /  DBili  x   /  AST  90[H]  /  ALT  49[H]  /  AlkPhos  129[H]  10-02    PT/INR - ( 01 Oct 2024 03:01 )   PT: 13.9 sec;   INR: 1.22 ratio         PTT - ( 02 Oct 2024 04:39 )  PTT:51.2 sec    Blood Type: ABO Interpretation: A (10-01 @ 03:06)    P2Y12  265  HGB A1C: 6.8  Thyroid Panel: 10-01 @ 11:30/--  1.8/8.1/82  10-01 @ 03:02/0.63  --/--/--    MRSA: MRSA PCR Result.: NotDetec (10-01 @ 12:42)   / MSSA:   Urinalysis Basic - ( 02 Oct 2024 00:49 )    Color: x / Appearance: x / SG: x / pH: x  Gluc: 152 mg/dL / Ketone: x  / Bili: x / Urobili: x   Blood: x / Protein: x / Nitrite: x   Leuk Esterase: x / RBC: x / WBC x   Sq Epi: x / Non Sq Epi: x / Bacteria: x        CXR: < from: Xray Chest 1 View- PORTABLE-Urgent (Xray Chest 1 View- PORTABLE-Urgent .) (10.01.24 @ 04:20) >  Intra-aortic balloon pump with tip 2 cm below the top of the the aortic   knob.  Inferior approach [place]-Sylvia catheter with tip at the right main pulmonary   artery.    The heart size is normal.  Bibasilar hazy interstitial opacities are unchanged.  There is no pneumothorax or pleural effusion.    IMPRESSION:  Lines as above.  No pneumothorax.    < end of copied text >  < from: VA Duplex Carotid, Bilat (10.01.24 @ 16:42) >  No significant hemodynamic stenosis of either carotid artery.    < end of copied text >  < from: TTE W or WO Ultrasound Enhancing Agent (10.01.24 @ 00:38) >  CONCLUSIONS:      1. Limited astudy, from which no conclusions can be drawn.   2. At least "moderate" mitral regurgitation.   3. Please obtain full wchocardiogram as soon as possible.    < end of copied text >           PFT's: pending      Cardiac catheterization: underwent R/LHC found to have severe LM and LAD disease and moderate pRCA disease. IABP placed for coronary perfusion      Gen: WN/WD NAD  Neuro: AAOx3, nonfocal  Pulm: CTA B/L  CV: RRR, S1S2  Abd: Soft, NT, ND +BS  Beck  Ext: No edema, + peripheral pulses  RT femoral IABP         Pt has AICD/PPM [ ] Yes  [x ] No             Brand Name:  Pre-op Beta Blocker ordered within 24 hrs of surgery (CABG ONLY)?  [ ] Yes Metorpolol 25 BID  Type & Cross  [x ] Yes  [ ] No  NPO after Midnight [ x] Yes  [ ] No  Pre-op ABX ordered, to be taped on chart:  [ x] Yes  [ ] No     Hibiclens/Peridex ordered [x ] Yes  [ ] No  Intraop on Hold: PRBCs, CXR, SAMUEL [x ]   Consent obtained  [x ] Yes  [ ] No    Plan  NPO at midnight   Hibiclens wash tonight and am  WBC 18   UA + ceftriaxone   IVPB 1000 milliGRAM(s) IV Intermittent every 24 hours   Zinacef on call to OR taped to chart   Peridex rinse and swish in am   CABG in am 10/3  with Dr Martini     Cardiac Surgery Pre-op Note:    CC: Patient is a 74y old  Female who presents with a chief complaint of CP underwent cardiac cath IABP placed now for CABG in am 10/3  with Dr. Martini      Referring Physician: Dr Steiner                                                                                                           Surgeon: DR Martini  CABG     Procedure: CABG 10/3    Allergies    No Known Allergies    Intolerances        HPI:  73 y/o female w/ PMH of hypothyroidism, CVA x2 (2018), HTN, HLD and DM who initially p/t Mango's w/ worsening back pain, SOB, N/V and CP. She was transferred as a Tier I w/ c/f NSTEMI and hypotension (SBP 80s). Her son was at bedside and says she it typically mobile with her walker and has never had such back pain or CP. ECG with ST depressions I, II, aVL, V2-V5 and ST elevations aVR, III, V1. She was ASA and brilinta and heparin loaded at OSH. Pt. underwent R/LHC found to have severe LM and LAD disease and moderate pRCA disease. IABP placed for coronary perfusion and admitted to CICU for CABG eval. On arrival to unit pt. endorses nausea and had one episode of emesis relieved w/ antiemetics. (01 Oct 2024 03:06)      PAST MEDICAL & SURGICAL HISTORY:  Hypothyroidism      Hypertension      Hyperlipidemia      Stroke      Diabetes      S/P cholecystectomy          MEDICATIONS  (STANDING):  acetaminophen     Tablet .. 1000 milliGRAM(s) Oral once  ascorbic acid 2000 milliGRAM(s) Oral once  aspirin  chewable 81 milliGRAM(s) Oral daily  atorvastatin 80 milliGRAM(s) Oral at bedtime  cefTRIAXone   IVPB 1000 milliGRAM(s) IV Intermittent every 24 hours  cefuroxime  IVPB 1500 milliGRAM(s) IV Intermittent once  chlorhexidine 0.12% Liquid 30 milliLiter(s) Swish and Spit once  chlorhexidine 2% Cloths 1 Application(s) Topical <User Schedule>  gabapentin 200 milliGRAM(s) Oral once  heparin  Infusion 900 Unit(s)/Hr (13.5 mL/Hr) IV Continuous <Continuous>  insulin lispro (ADMELOG) corrective regimen sliding scale   SubCutaneous three times a day before meals  insulin lispro (ADMELOG) corrective regimen sliding scale   SubCutaneous at bedtime  levothyroxine 75 MICROGram(s) Oral daily  metoprolol tartrate 25 milliGRAM(s) Oral two times a day  mupirocin 2% Ointment 1 Application(s) Both Nostrils two times a day  traZODone 50 milliGRAM(s) Oral at bedtime    MEDICATIONS  (PRN):    T(C): 37.7 (10-02-24 @ 10:00), Max: 38.2 (10-02-24 @ 08:00)  T(F): 99.9 (10-02-24 @ 10:00), Max: 100.8 (10-02-24 @ 08:00)  HR: 84 (10-02-24 @ 10:00) (78 - 98)  RR: 29 (10-02-24 @ 10:00) (18 - 33)  SpO2: 97% (10-02-24 @ 10:00) (94% - 100%)    Daily   175 cm  Daily Weight in k.8 (02 Oct 2024 00:00)      Labs:                        8.6    18.64 )-----------( 201      ( 02 Oct 2024 00:49 )             26.8     10-02    136  |  98  |  24[H]  ----------------------------<  152[H]  3.7   |  20[L]  |  1.20    Ca    8.4      02 Oct 2024 00:49  Phos  3.5     10-02  Mg     2.2     10-02    TPro  7.2  /  Alb  3.2[L]  /  TBili  0.5  /  DBili  x   /  AST  90[H]  /  ALT  49[H]  /  AlkPhos  129[H]  10-02    PT/INR - ( 01 Oct 2024 03:01 )   PT: 13.9 sec;   INR: 1.22 ratio         PTT - ( 02 Oct 2024 04:39 )  PTT:51.2 sec    Blood Type: ABO Interpretation: A (10-01 @ 03:06)    P2Y12  265  HGB A1C: 6.8  Thyroid Panel: 10-01 @ 11:30/--  1.8/8.1/82  10-01 @ 03:02/0.63  --/--/--    MRSA: MRSA PCR Result.: NotDetec (10-01 @ 12:42)   / MSSA:   Urinalysis Basic - ( 02 Oct 2024 00:49 )    Color: x / Appearance: x / SG: x / pH: x  Gluc: 152 mg/dL / Ketone: x  / Bili: x / Urobili: x   Blood: x / Protein: x / Nitrite: x   Leuk Esterase: x / RBC: x / WBC x   Sq Epi: x / Non Sq Epi: x / Bacteria: x        CXR: < from: Xray Chest 1 View- PORTABLE-Urgent (Xray Chest 1 View- PORTABLE-Urgent .) (10.01.24 @ 04:20) >  Intra-aortic balloon pump with tip 2 cm below the top of the the aortic   knob.  Inferior approach [place]-Sylvia catheter with tip at the right main pulmonary   artery.    The heart size is normal.  Bibasilar hazy interstitial opacities are unchanged.  There is no pneumothorax or pleural effusion.    IMPRESSION:  Lines as above.  No pneumothorax.    < end of copied text >  < from: VA Duplex Carotid, Bilat (10.01.24 @ 16:42) >  No significant hemodynamic stenosis of either carotid artery.    < end of copied text >  < from: TTE W or WO Ultrasound Enhancing Agent (10.01.24 @ 00:38) >  CONCLUSIONS:      1. Limited astudy, from which no conclusions can be drawn.   2. At least "moderate" mitral regurgitation.   3. Please obtain full wchocardiogram as soon as possible.    < end of copied text >           PFT's: pending      Cardiac catheterization: underwent R/LHC found to have severe LM and LAD disease and moderate pRCA disease. IABP placed for coronary perfusion      Gen: WN/WD NAD  Neuro: AAOx3, nonfocal  left sided residual weakness hx cva  ambulates with walker  Pulm: CTA B/L  CV: RRR, S1S2  Abd: Soft, NT, ND +BS  Beck  Ext: No edema, + peripheral pulses  RT femoral IABP         Pt has AICD/PPM [ ] Yes  [x ] No             Brand Name:  Pre-op Beta Blocker ordered within 24 hrs of surgery (CABG ONLY)?  [ ] Yes Metorpolol 25 BID  Type & Cross  [x ] Yes  [ ] No  NPO after Midnight [ x] Yes  [ ] No  Pre-op ABX ordered, to be taped on chart:  [ x] Yes  [ ] No     Hibiclens/Peridex ordered [x ] Yes  [ ] No  Intraop on Hold: PRBCs, CXR, SAMUEL [x ]   Consent obtained  [x ] Yes  [ ] No    Plan  NPO at midnight   Hibiclens wash tonight and am  WBC 18   UA + ceftriaxone   IVPB 1000 milliGRAM(s) IV Intermittent every 24 hours   Zinacef on call to OR taped to chart   Peridex rinse and swish in am   CABG in am 10/3  with Dr Martini

## 2024-10-02 NOTE — PROGRESS NOTE ADULT - ASSESSMENT
Assessment: 74F w/ PMH of hypothyroidism, CVA x2 (2018), HTN, HLD and DM who initially p/t Mango's w/ worsening back pain, SOB, N/V and CP, transferred to [facility] for c/f STEMI/NSTEMI underwent R/LHC and found to have severe LM and LAD disease and moderate pRCA disease. IABP placed, pending CABG.    Plan:    NEURO  # Stroke x2 (2018) w/ ?residual right sided weakness  - A+Ox3  - Monitor mental status per protocol  - Ambulates independently w/ cane at home    PULM  - SpO2 WNL on 2L nasal cannula, wean as tolerated    CV  # STEMI/Multivessal CAD (LM, Cx and RCA disease)  # HLD  - s/p R/LHC 10/1, severe LM, Cx disease and moderate RCA disease  - c/w IABP 1:1, w/ heparin gtt  - c/w ASA, holding brilinta, PSY12 [id number]  - GDMT: Lopressor 25 BID  - NPO after MN: CABG tomorrow     # HTN  - Lopressor 25 BID    GI  # N/V, improved/resolved  - 10/1 Abdominal x-ray negative for obstruction  - s/p zofran and reglan w/ relief    /RENAL  - No acute issues  - Trend BMP, lytes   - Strict I/Os    ENDO  # DM  - On 46U lantus at home  - mISS for now  - a1c 6.8    # Hypothyroidism  - c/w synthroid    HEME  - c/w heparin gtt for IABP and VTE ppx  - Trend CBC, coags per protocol    ID  # UTI  - UA borderline, low grade temps - given pre-operative status would treat fro uncomplicated cystitis  - CTX 1g q24h 10/2-4  - Trend WBC count and fever curve     Patient requires continuous monitoring with bedside rhythm monitoring, pulse ox monitoring, and intermittent blood gas analysis. Care plan discussed with ICU care team. Patient remained critical and at risk for life threatening decompensation.  Patient seen, examined and plan discussed with CCU team during rounds.     I have personally provided 35 minutes of critical care time excluding time spent on separate procedures, in addition to initial critical care time provided by the CICU Attending, Dr. Steiner.    Brittany Vázquez, [facility]-BC

## 2024-10-02 NOTE — PROGRESS NOTE ADULT - SUBJECTIVE AND OBJECTIVE BOX
PATIENT:  ISAC SANDOVAL  [id number]    CHIEF COMPLAINT:  Patient is a 74y old female who presents with a chief complaint of CP (02 Oct 2024 06:56)    INTERVAL HISTORY:  - Will be NPO after MN for CABG tomorrow    MEDICATIONS:  MEDICATIONS  (STANDING):  acetaminophen     Tablet .. 1000 milliGRAM(s) Oral once  ascorbic acid 2000 milliGRAM(s) Oral once  aspirin  chewable 81 milliGRAM(s) Oral daily  atorvastatin 80 milliGRAM(s) Oral at bedtime  cefTRIAXone   IVPB 1000 milliGRAM(s) IV Intermittent every 24 hours  cefuroxime  IVPB 1500 milliGRAM(s) IV Intermittent once  chlorhexidine 0.12% Liquid 30 milliLiter(s) Swish and Spit once  chlorhexidine 0.12% Liquid 30 milliLiter(s) Swish and Spit once  chlorhexidine 2% Cloths 1 Application(s) Topical <User Schedule>  chlorhexidine 4% Liquid 1 Application(s) Topical once  gabapentin 200 milliGRAM(s) Oral once  heparin  Infusion 900 Unit(s)/Hr (14.5 mL/Hr) IV Continuous <Continuous>  insulin lispro (ADMELOG) corrective regimen sliding scale   SubCutaneous three times a day before meals  insulin lispro (ADMELOG) corrective regimen sliding scale   SubCutaneous at bedtime  levothyroxine 75 MICROGram(s) Oral daily  metoprolol tartrate 25 milliGRAM(s) Oral two times a day  traZODone 50 milliGRAM(s) Oral at bedtime    MEDICATIONS  (PRN):    ALLERGIES:  No Known Allergies    OBJECTIVE:  ICU Vital Signs Last 24 Hrs  T(C): 37.6 (02 Oct 2024 16:00), Max: 38.2 (02 Oct 2024 08:00)  T(F): 99.7 (02 Oct 2024 16:00), Max: 100.8 (02 Oct 2024 08:00)  HR: 92 (02 Oct 2024 19:00) (73 - 98)  BP: --  BP(mean): --  ABP: --  ABP(mean): --  RR: 22 (02 Oct 2024 19:00) (18 - 31)  SpO2: 97% (02 Oct 2024 19:00) (95% - 100%)    O2 Parameters below as of 02 Oct 2024 19:00  Patient On (Oxygen Delivery Method): nasal cannula  O2 Flow (L/min): 2    CAPILLARY BLOOD GLUCOSE  POCT Blood Glucose.: 171 mg/dL (02 Oct 2024 17:09)  POCT Blood Glucose.: 171 mg/dL (02 Oct 2024 11:49)  POCT Blood Glucose.: 160 mg/dL (02 Oct 2024 05:17)  POCT Blood Glucose.: 167 mg/dL (02 Oct 2024 00:27)    I&O's Summary    01 Oct 2024 07:01  -  02 Oct 2024 07:00  --------------------------------------------------------  IN: 726 mL / OUT: 1990 mL / NET: -1264 mL    02 Oct 2024 07:01  -  02 Oct 2024 20:31  --------------------------------------------------------  IN: 1202 mL / OUT: 410 mL / NET: 792 mL    Daily     Daily Weight in k.8 (02 Oct 2024 00:00)    LABS:                        8.6    18.64 )-----------( 201      ( 02 Oct 2024 00:49 )             26.8     1002    136  |  98  |  24[H]  ----------------------------<  152[H]  3.7   |  20[L]  |  1.20    Ca    8.4      02 Oct 2024 00:49  Phos  3.5     10-02  Mg     2.2     10-    TPro  7.2  /  Alb  3.2[L]  /  TBili  0.5  /  DBili  x   /  AST  90[H]  /  ALT  49[H]  /  AlkPhos  129[H]  10-02    LIVER FUNCTIONS - ( 02 Oct 2024 00:49 )  Alb: 3.2 g/dL / Pro: 7.2 g/dL / ALK PHOS: 129 U/L / ALT: 49 U/L / AST: 90 U/L / GGT: x           PT/INR - ( 01 Oct 2024 03:01 )   PT: 13.9 sec;   INR: 1.22 ratio    PTT - ( 02 Oct 2024 19:30 )  PTT: 57.8 sec

## 2024-10-02 NOTE — PROGRESS NOTE ADULT - SUBJECTIVE AND OBJECTIVE BOX
PATIENT:  ISAC SANDOVAL  [id number]    CHIEF COMPLAINT:  Patient is a 74y old  Female who presents with a chief complaint of CP (01 Oct 2024 19:28)      INTERVAL HISTORYOVERNIGHT EVENTS:      REVIEW OF SYSTEMS:    Constitutional:     [ ] negative [ ] fevers [ ] chills [ ] weight loss [ ] weight gain  HEENT:                  [ ] negative [ ] dry eyes [ ] eye irritation [ ] postnasal drip [ ] nasal congestion  CV:                         [ ] negative  [ ] chest pain [ ] orthopnea [ ] palpitations [ ] murmur  Resp:                     [ ] negative [ ] cough [ ] shortness of breath [ ] dyspnea [ ] wheezing [ ] sputum [ ] hemoptysis  GI:                          [ ] negative [ ] nausea [ ] vomiting [ ] diarrhea [ ] constipation [ ] abd pain [ ] dysphagia   :                        [ ] negative [ ] dysuria [ ] nocturia [ ] hematuria [ ] increased urinary frequency  Musculoskeletal: [ ] negative [ ] back pain [ ] myalgias [ ] arthralgias [ ] fracture  Skin:                       [ ] negative [ ] rash [ ] itch  Neurological:        [ ] negative [ ] headache [ ] dizziness [ ] syncope [ ] weakness [ ] numbness  Psychiatric:           [ ] negative [ ] anxiety [ ] depression  Endocrine:            [ ] negative [ ] diabetes [ ] thyroid problem  Heme/Lymph:      [ ] negative [ ] anemia [ ] bleeding problem  Allergic/Immune: [ ] negative [ ] itchy eyes [ ] nasal discharge [ ] hives [ ] angioedema    [ ] All other systems negative  [ ] Unable to assess ROS because ________.    MEDICATIONS:  MEDICATIONS  (STANDING):  acetaminophen     Tablet .. 1000 milliGRAM(s) Oral once  ascorbic acid 2000 milliGRAM(s) Oral once  aspirin  chewable 81 milliGRAM(s) Oral daily  atorvastatin 80 milliGRAM(s) Oral at bedtime  cefuroxime  IVPB 1500 milliGRAM(s) IV Intermittent once  chlorhexidine 0.12% Liquid 30 milliLiter(s) Swish and Spit once  chlorhexidine 2% Cloths 1 Application(s) Topical <User Schedule>  gabapentin 200 milliGRAM(s) Oral once  heparin  Infusion 900 Unit(s)/Hr (13.5 mL/Hr) IV Continuous <Continuous>  insulin lispro (ADMELOG) corrective regimen sliding scale   SubCutaneous every 6 hours  levothyroxine 75 MICROGram(s) Oral daily  metoprolol tartrate 25 milliGRAM(s) Oral two times a day  mupirocin 2% Ointment 1 Application(s) Both Nostrils two times a day  traZODone 50 milliGRAM(s) Oral at bedtime    MEDICATIONS  (PRN):      ALLERGIES:  Allergies    No Known Allergies    Intolerances        OBJECTIVE:  ICU Vital Signs Last 24 Hrs  T(C): 36.8 (02 Oct 2024 03:00), Max: 37.4 (01 Oct 2024 19:00)  T(F): 98.3 (02 Oct 2024 03:00), Max: 99.3 (01 Oct 2024 19:00)  HR: 78 (02 Oct 2024 06:00) (76 - 98)  BP: --  BP(mean): --  ABP: --  ABP(mean): --  RR: 20 (02 Oct 2024 06:00) (18 - 33)  SpO2: 95% (02 Oct 2024 06:00) (94% - 100%)    O2 Parameters below as of 02 Oct 2024 06:00  Patient On (Oxygen Delivery Method): room air            Adult Advanced Hemodynamics Last 24 Hrs  CVP(mm Hg): 3 (01 Oct 2024 13:00) (2 - 8)  CVP(cm H2O): --  CO: --  CI: --  PA: 30/14 (01 Oct 2024 13:00) (25/8 - 33/13)  PA(mean): 21 (01 Oct 2024 13:00) (15 - 22)  PCWP: --  SVR: --  SVRI: --  PVR: --  PVRI: --  CAPILLARY BLOOD GLUCOSE      POCT Blood Glucose.: 160 mg/dL (02 Oct 2024 05:17)  POCT Blood Glucose.: 167 mg/dL (02 Oct 2024 00:27)  POCT Blood Glucose.: 132 mg/dL (01 Oct 2024 18:14)  POCT Blood Glucose.: 126 mg/dL (01 Oct 2024 12:22)    CAPILLARY BLOOD GLUCOSE      POCT Blood Glucose.: 160 mg/dL (02 Oct 2024 05:17)    I&O's Summary    30 Sep 2024 07:01  -  01 Oct 2024 07:00  --------------------------------------------------------  IN: 86 mL / OUT: 625 mL / NET: -539 mL    01 Oct 2024 07:01  -  02 Oct 2024 06:57  --------------------------------------------------------  IN: 712.5 mL / OUT: 1940 mL / NET: -1227.5 mL      Daily     Daily Weight in k.8 (02 Oct 2024 00:00)    PHYSICAL EXAMINATION:  General: WN/WD NAD  HEENT: PERRLA, EOMI, moist mucous membranes  Neurology: A&Ox3, nonfocal, LANTIGUA x 4  Respiratory: CTA B/L, normal respiratory effort, no wheezes, crackles, rales  CV: RRR, S1S2, no murmurs, rubs or gallops  Abdominal: Soft, NT, ND +BS, Last BM  Extremities: No edema, + peripheral pulses  Incisions:   Tubes:    LABS:                          8.6    18.64 )-----------( 201      ( 02 Oct 2024 00:49 )             26.8     10-02    136  |  98  |  24[H]  ----------------------------<  152[H]  3.7   |  20[L]  |  1.20    Ca    8.4      02 Oct 2024 00:49  Phos  3.5     10-02  Mg     2.2     10-    TPro  7.2  /  Alb  3.2[L]  /  TBili  0.5  /  DBili  x   /  AST  90[H]  /  ALT  49[H]  /  AlkPhos  129[H]  10-02    LIVER FUNCTIONS - ( 02 Oct 2024 00:49 )  Alb: 3.2 g/dL / Pro: 7.2 g/dL / ALK PHOS: 129 U/L / ALT: 49 U/L / AST: 90 U/L / GGT: x           PT/INR - ( 01 Oct 2024 03:01 )   PT: 13.9 sec;   INR: 1.22 ratio         PTT - ( 02 Oct 2024 04:39 )  PTT:51.2 sec        Urinalysis Basic - ( 02 Oct 2024 00:49 )    Color: x / Appearance: x / SG: x / pH: x  Gluc: 152 mg/dL / Ketone: x  / Bili: x / Urobili: x   Blood: x / Protein: x / Nitrite: x   Leuk Esterase: x / RBC: x / WBC x   Sq Epi: x / Non Sq Epi: x / Bacteria: x      A/P: 74F PMH of hypothyroidism, CVA x2 (2018), HTN, HLD and DM who initially p/t Mango's w/ worsening back pain, SOB, N/V and CP, transferred to [facility] for c/f STEMI/NSTEMI underwent R/LHC and found to have severe LM and LAD disease and moderate pRCA disease. IABP placed, pending CABG.    Plan:    NEURO  # Stroke x2 () w/ ?residual right sided weakness  - A+Ox3  - Monitor mental status per protocol  - Ambulates independently w/ cane at home    PULM  - SpO2 WNL on 2L nasal cannula    CV  # STEMI/Multivessal CAD (LM, Cx and RCA disease)  # HLD  - s/p R/LHC 10/1, severe LM, Cx disease and moderate RCA disease  - c/w IABP 1:1, w/ heparin gtt  - c/w ASA, holding brilinta   - CVP 12 > lasix 40 given, trend  - GDMT: Lopressor 25 BID  - NPO after MN: CABG tomorrow     # HTN  - Lopressor 25 BID    GI  # N/V  - NPO for now  - Abdominal x-ray ordered but most likely anginal  - s/p zofran and reglan w/ relief    /RENAL  - No acute issues  - Trend BMP, lytes   - Strict I/Os    ENDO  # DM  - On 46U lantus at home  - mISS for now  - f/u a1c    # Hypothyroidism  - c/w synthroid  - f/u TSH    HEME  - c/w heparin gtt for IABP and VTE ppx  - Trend CBC, coags per protocol    ID  # Leukocytosis  - Most likely reactive iso STEMI  - Afebrile  - Trend WBC     PATIENT:  ISAC SANDOVAL  [id number]    CHIEF COMPLAINT:  Patient is a 74y old  Female who presents with a chief complaint of CP (01 Oct 2024 19:28)      INTERVAL HISTORYOVERNIGHT EVENTS:      REVIEW OF SYSTEMS:    Constitutional:     [ ] negative [ ] fevers [ ] chills [ ] weight loss [ ] weight gain  HEENT:                  [ ] negative [ ] dry eyes [ ] eye irritation [ ] postnasal drip [ ] nasal congestion  CV:                         [ ] negative  [ ] chest pain [ ] orthopnea [ ] palpitations [ ] murmur  Resp:                     [ ] negative [ ] cough [ ] shortness of breath [ ] dyspnea [ ] wheezing [ ] sputum [ ] hemoptysis  GI:                          [ ] negative [ ] nausea [ ] vomiting [ ] diarrhea [ ] constipation [ ] abd pain [ ] dysphagia   :                        [ ] negative [ ] dysuria [ ] nocturia [ ] hematuria [ ] increased urinary frequency  Musculoskeletal: [ ] negative [ ] back pain [ ] myalgias [ ] arthralgias [ ] fracture  Skin:                       [ ] negative [ ] rash [ ] itch  Neurological:        [ ] negative [ ] headache [ ] dizziness [ ] syncope [ ] weakness [ ] numbness  Psychiatric:           [ ] negative [ ] anxiety [ ] depression  Endocrine:            [ ] negative [ ] diabetes [ ] thyroid problem  Heme/Lymph:      [ ] negative [ ] anemia [ ] bleeding problem  Allergic/Immune: [ ] negative [ ] itchy eyes [ ] nasal discharge [ ] hives [ ] angioedema    [ ] All other systems negative  [ ] Unable to assess ROS because ________.    MEDICATIONS:  MEDICATIONS  (STANDING):  acetaminophen     Tablet .. 1000 milliGRAM(s) Oral once  ascorbic acid 2000 milliGRAM(s) Oral once  aspirin  chewable 81 milliGRAM(s) Oral daily  atorvastatin 80 milliGRAM(s) Oral at bedtime  cefuroxime  IVPB 1500 milliGRAM(s) IV Intermittent once  chlorhexidine 0.12% Liquid 30 milliLiter(s) Swish and Spit once  chlorhexidine 2% Cloths 1 Application(s) Topical <User Schedule>  gabapentin 200 milliGRAM(s) Oral once  heparin  Infusion 900 Unit(s)/Hr (13.5 mL/Hr) IV Continuous <Continuous>  insulin lispro (ADMELOG) corrective regimen sliding scale   SubCutaneous every 6 hours  levothyroxine 75 MICROGram(s) Oral daily  metoprolol tartrate 25 milliGRAM(s) Oral two times a day  mupirocin 2% Ointment 1 Application(s) Both Nostrils two times a day  traZODone 50 milliGRAM(s) Oral at bedtime    MEDICATIONS  (PRN):      ALLERGIES:  Allergies    No Known Allergies    Intolerances        OBJECTIVE:  ICU Vital Signs Last 24 Hrs  T(C): 36.8 (02 Oct 2024 03:00), Max: 37.4 (01 Oct 2024 19:00)  T(F): 98.3 (02 Oct 2024 03:00), Max: 99.3 (01 Oct 2024 19:00)  HR: 78 (02 Oct 2024 06:00) (76 - 98)  BP: --  BP(mean): --  ABP: --  ABP(mean): --  RR: 20 (02 Oct 2024 06:00) (18 - 33)  SpO2: 95% (02 Oct 2024 06:00) (94% - 100%)    O2 Parameters below as of 02 Oct 2024 06:00  Patient On (Oxygen Delivery Method): room air            Adult Advanced Hemodynamics Last 24 Hrs  CVP(mm Hg): 3 (01 Oct 2024 13:00) (2 - 8)  CVP(cm H2O): --  CO: --  CI: --  PA: 30/14 (01 Oct 2024 13:00) (25/8 - 33/13)  PA(mean): 21 (01 Oct 2024 13:00) (15 - 22)  PCWP: --  SVR: --  SVRI: --  PVR: --  PVRI: --  CAPILLARY BLOOD GLUCOSE      POCT Blood Glucose.: 160 mg/dL (02 Oct 2024 05:17)  POCT Blood Glucose.: 167 mg/dL (02 Oct 2024 00:27)  POCT Blood Glucose.: 132 mg/dL (01 Oct 2024 18:14)  POCT Blood Glucose.: 126 mg/dL (01 Oct 2024 12:22)    CAPILLARY BLOOD GLUCOSE      POCT Blood Glucose.: 160 mg/dL (02 Oct 2024 05:17)    I&O's Summary    30 Sep 2024 07:01  -  01 Oct 2024 07:00  --------------------------------------------------------  IN: 86 mL / OUT: 625 mL / NET: -539 mL    01 Oct 2024 07:01  -  02 Oct 2024 06:57  --------------------------------------------------------  IN: 712.5 mL / OUT: 1940 mL / NET: -1227.5 mL      Daily     Daily Weight in k.8 (02 Oct 2024 00:00)    PHYSICAL EXAMINATION:  General: WN/WD NAD  HEENT: PERRLA, EOMI, moist mucous membranes  Neurology: A&Ox3, nonfocal, LANTIGUA x 4  Respiratory: CTA B/L, normal respiratory effort, no wheezes, crackles, rales  CV: RRR, S1S2, no murmurs, rubs or gallops  Abdominal: Soft, NT, ND +BS, Last BM  Extremities: No edema, + peripheral pulses  Incisions:   Tubes:    LABS:                          8.6    18.64 )-----------( 201      ( 02 Oct 2024 00:49 )             26.8     10-02    136  |  98  |  24[H]  ----------------------------<  152[H]  3.7   |  20[L]  |  1.20    Ca    8.4      02 Oct 2024 00:49  Phos  3.5     10-02  Mg     2.2     10-    TPro  7.2  /  Alb  3.2[L]  /  TBili  0.5  /  DBili  x   /  AST  90[H]  /  ALT  49[H]  /  AlkPhos  129[H]  10-02    LIVER FUNCTIONS - ( 02 Oct 2024 00:49 )  Alb: 3.2 g/dL / Pro: 7.2 g/dL / ALK PHOS: 129 U/L / ALT: 49 U/L / AST: 90 U/L / GGT: x           PT/INR - ( 01 Oct 2024 03:01 )   PT: 13.9 sec;   INR: 1.22 ratio         PTT - ( 02 Oct 2024 04:39 )  PTT:51.2 sec        Urinalysis Basic - ( 02 Oct 2024 00:49 )    Color: x / Appearance: x / SG: x / pH: x  Gluc: 152 mg/dL / Ketone: x  / Bili: x / Urobili: x   Blood: x / Protein: x / Nitrite: x   Leuk Esterase: x / RBC: x / WBC x   Sq Epi: x / Non Sq Epi: x / Bacteria: x      A/P: 74F PMH of hypothyroidism, CVA x2 (2018), HTN, HLD and DM who initially p/t Mango's w/ worsening back pain, SOB, N/V and CP, transferred to [facility] for c/f STEMI/NSTEMI underwent R/LHC and found to have severe LM and LAD disease and moderate pRCA disease. IABP placed, pending CABG.    Plan:    NEURO  # Stroke x2 (2018) w/ ?residual right sided weakness  - A+Ox3  - Monitor mental status per protocol  - Ambulates independently w/ cane at home    PULM  - SpO2 WNL on 2L nasal cannula    CV  # STEMI/Multivessal CAD (LM, Cx and RCA disease)  # HLD  - s/p R/LHC 10/1, severe LM, Cx disease and moderate RCA disease  - c/w IABP 1:1, w/ heparin gtt  - c/w ASA, holding brilinta   - CVP 12 > lasix 40 given, trend  - GDMT: Lopressor 25 BID  - NPO after MN: CABG tomorrow     # HTN  - Lopressor 25 BID    GI  # N/V  - Abdominal x-ray ordered but most likely anginal    - s/p zofran and reglan w/ relief    /RENAL  - No acute issues  - Trend BMP, lytes   - Strict I/Os    ENDO  # DM  - On 46U lantus at home  - mISS for now  - f/u a1c    # Hypothyroidism  - c/w synthroid  - f/u TSH    HEME  - c/w heparin gtt for IABP and VTE ppx  - Trend CBC, coags per protocol    ID  # UTI  - UA borderline, low grade temps - given pre-operative status would treat fro uncomplicated cystitis  - CTX 1g q24h 10/-4  - Trend WBC count and fever curve

## 2024-10-03 ENCOUNTER — RESULT REVIEW (OUTPATIENT)
Age: 74
End: 2024-10-03

## 2024-10-03 ENCOUNTER — APPOINTMENT (OUTPATIENT)
Dept: CARDIOTHORACIC SURGERY | Facility: HOSPITAL | Age: 74
End: 2024-10-03

## 2024-10-03 LAB
ALBUMIN SERPL ELPH-MCNC: 2.9 G/DL — LOW (ref 3.3–5)
ALBUMIN SERPL ELPH-MCNC: 3.1 G/DL — LOW (ref 3.3–5)
ALP SERPL-CCNC: 139 U/L — HIGH (ref 40–120)
ALP SERPL-CCNC: 81 U/L — SIGNIFICANT CHANGE UP (ref 40–120)
ALT FLD-CCNC: 26 U/L — SIGNIFICANT CHANGE UP (ref 10–45)
ALT FLD-CCNC: 42 U/L — SIGNIFICANT CHANGE UP (ref 10–45)
ANION GAP SERPL CALC-SCNC: 11 MMOL/L — SIGNIFICANT CHANGE UP (ref 5–17)
ANION GAP SERPL CALC-SCNC: 15 MMOL/L — SIGNIFICANT CHANGE UP (ref 5–17)
APTT BLD: 28.6 SEC — SIGNIFICANT CHANGE UP (ref 24.5–35.6)
APTT BLD: 56.9 SEC — HIGH (ref 24.5–35.6)
APTT BLD: 72.7 SEC — HIGH (ref 24.5–35.6)
AST SERPL-CCNC: 51 U/L — HIGH (ref 10–40)
AST SERPL-CCNC: 69 U/L — HIGH (ref 10–40)
BASE EXCESS BLDV CALC-SCNC: -5.2 MMOL/L — LOW (ref -2–3)
BASOPHILS # BLD AUTO: 0 K/UL — SIGNIFICANT CHANGE UP (ref 0–0.2)
BASOPHILS # BLD AUTO: 0.06 K/UL — SIGNIFICANT CHANGE UP (ref 0–0.2)
BASOPHILS NFR BLD AUTO: 0 % — SIGNIFICANT CHANGE UP (ref 0–2)
BASOPHILS NFR BLD AUTO: 0.3 % — SIGNIFICANT CHANGE UP (ref 0–2)
BILIRUB SERPL-MCNC: 0.6 MG/DL — SIGNIFICANT CHANGE UP (ref 0.2–1.2)
BILIRUB SERPL-MCNC: 1.4 MG/DL — HIGH (ref 0.2–1.2)
BUN SERPL-MCNC: 15 MG/DL — SIGNIFICANT CHANGE UP (ref 7–23)
BUN SERPL-MCNC: 25 MG/DL — HIGH (ref 7–23)
CALCIUM SERPL-MCNC: 10.5 MG/DL — SIGNIFICANT CHANGE UP (ref 8.4–10.5)
CALCIUM SERPL-MCNC: 8.8 MG/DL — SIGNIFICANT CHANGE UP (ref 8.4–10.5)
CHLORIDE SERPL-SCNC: 107 MMOL/L — SIGNIFICANT CHANGE UP (ref 96–108)
CHLORIDE SERPL-SCNC: 99 MMOL/L — SIGNIFICANT CHANGE UP (ref 96–108)
CK MB BLD-MCNC: 15.1 % — HIGH (ref 0–3.5)
CK MB CFR SERPL CALC: 75.6 NG/ML — HIGH (ref 0–3.8)
CK SERPL-CCNC: 500 U/L — HIGH (ref 25–170)
CO2 BLDV-SCNC: 24 MMOL/L — SIGNIFICANT CHANGE UP (ref 22–26)
CO2 SERPL-SCNC: 21 MMOL/L — LOW (ref 22–31)
CO2 SERPL-SCNC: 25 MMOL/L — SIGNIFICANT CHANGE UP (ref 22–31)
CREAT SERPL-MCNC: 0.72 MG/DL — SIGNIFICANT CHANGE UP (ref 0.5–1.3)
CREAT SERPL-MCNC: 1.06 MG/DL — SIGNIFICANT CHANGE UP (ref 0.5–1.3)
EGFR: 55 ML/MIN/1.73M2 — LOW
EGFR: 88 ML/MIN/1.73M2 — SIGNIFICANT CHANGE UP
EOSINOPHIL # BLD AUTO: 0.03 K/UL — SIGNIFICANT CHANGE UP (ref 0–0.5)
EOSINOPHIL # BLD AUTO: 0.37 K/UL — SIGNIFICANT CHANGE UP (ref 0–0.5)
EOSINOPHIL NFR BLD AUTO: 0.2 % — SIGNIFICANT CHANGE UP (ref 0–6)
EOSINOPHIL NFR BLD AUTO: 0.9 % — SIGNIFICANT CHANGE UP (ref 0–6)
FIBRINOGEN PPP-MCNC: 431 MG/DL — SIGNIFICANT CHANGE UP (ref 200–445)
FLUAV AG NPH QL: SIGNIFICANT CHANGE UP
FLUBV AG NPH QL: SIGNIFICANT CHANGE UP
GAS PNL BLDA: SIGNIFICANT CHANGE UP
GAS PNL BLDV: SIGNIFICANT CHANGE UP
GLUCOSE BLDC GLUCOMTR-MCNC: 127 MG/DL — HIGH (ref 70–99)
GLUCOSE BLDC GLUCOMTR-MCNC: 131 MG/DL — HIGH (ref 70–99)
GLUCOSE BLDC GLUCOMTR-MCNC: 152 MG/DL — HIGH (ref 70–99)
GLUCOSE BLDC GLUCOMTR-MCNC: 154 MG/DL — HIGH (ref 70–99)
GLUCOSE BLDC GLUCOMTR-MCNC: 157 MG/DL — HIGH (ref 70–99)
GLUCOSE SERPL-MCNC: 147 MG/DL — HIGH (ref 70–99)
GLUCOSE SERPL-MCNC: 194 MG/DL — HIGH (ref 70–99)
HCO3 BLDV-SCNC: 23 MMOL/L — SIGNIFICANT CHANGE UP (ref 22–29)
HCT VFR BLD CALC: 26.6 % — LOW (ref 34.5–45)
HCT VFR BLD CALC: 26.7 % — LOW (ref 34.5–45)
HEPARINASE TEG R TIME: 13.7 MIN — HIGH (ref 4.3–8.3)
HEPARINASE TEG R TIME: 8.9 MIN — HIGH (ref 4.3–8.3)
HGB BLD-MCNC: 8.5 G/DL — LOW (ref 11.5–15.5)
HGB BLD-MCNC: 9 G/DL — LOW (ref 11.5–15.5)
IMM GRANULOCYTES NFR BLD AUTO: 1.8 % — HIGH (ref 0–0.9)
INR BLD: 1.43 RATIO — HIGH (ref 0.85–1.16)
LYMPHOCYTES # BLD AUTO: 11.2 % — LOW (ref 13–44)
LYMPHOCYTES # BLD AUTO: 12.2 % — LOW (ref 13–44)
LYMPHOCYTES # BLD AUTO: 2.03 K/UL — SIGNIFICANT CHANGE UP (ref 1–3.3)
LYMPHOCYTES # BLD AUTO: 4.97 K/UL — HIGH (ref 1–3.3)
MAGNESIUM SERPL-MCNC: 2.1 MG/DL — SIGNIFICANT CHANGE UP (ref 1.6–2.6)
MAGNESIUM SERPL-MCNC: 3.2 MG/DL — HIGH (ref 1.6–2.6)
MANUAL SMEAR VERIFICATION: SIGNIFICANT CHANGE UP
MCHC RBC-ENTMCNC: 26.5 PG — LOW (ref 27–34)
MCHC RBC-ENTMCNC: 28.8 PG — SIGNIFICANT CHANGE UP (ref 27–34)
MCHC RBC-ENTMCNC: 31.8 GM/DL — LOW (ref 32–36)
MCHC RBC-ENTMCNC: 33.8 GM/DL — SIGNIFICANT CHANGE UP (ref 32–36)
MCV RBC AUTO: 83.2 FL — SIGNIFICANT CHANGE UP (ref 80–100)
MCV RBC AUTO: 85 FL — SIGNIFICANT CHANGE UP (ref 80–100)
METAMYELOCYTES # FLD: 4.3 % — HIGH (ref 0–0)
MICROCYTES BLD QL: SLIGHT — SIGNIFICANT CHANGE UP
MONOCYTES # BLD AUTO: 1.52 K/UL — HIGH (ref 0–0.9)
MONOCYTES # BLD AUTO: 1.75 K/UL — HIGH (ref 0–0.9)
MONOCYTES NFR BLD AUTO: 4.3 % — SIGNIFICANT CHANGE UP (ref 2–14)
MONOCYTES NFR BLD AUTO: 8.4 % — SIGNIFICANT CHANGE UP (ref 2–14)
MRSA PCR RESULT.: SIGNIFICANT CHANGE UP
NEUTROPHILS # BLD AUTO: 14.22 K/UL — HIGH (ref 1.8–7.4)
NEUTROPHILS # BLD AUTO: 31.88 K/UL — HIGH (ref 1.8–7.4)
NEUTROPHILS NFR BLD AUTO: 75.7 % — SIGNIFICANT CHANGE UP (ref 43–77)
NEUTROPHILS NFR BLD AUTO: 78.1 % — HIGH (ref 43–77)
NEUTS BAND # BLD: 2.6 % — SIGNIFICANT CHANGE UP (ref 0–8)
NRBC # BLD: 0 /100 WBCS — SIGNIFICANT CHANGE UP (ref 0–0)
PCO2 BLDV: 53 MMHG — HIGH (ref 39–42)
PH BLDV: 7.24 — LOW (ref 7.32–7.43)
PHOSPHATE SERPL-MCNC: 2.2 MG/DL — LOW (ref 2.5–4.5)
PHOSPHATE SERPL-MCNC: 3.5 MG/DL — SIGNIFICANT CHANGE UP (ref 2.5–4.5)
PLAT MORPH BLD: NORMAL — SIGNIFICANT CHANGE UP
PLATELET # BLD AUTO: 136 K/UL — LOW (ref 150–400)
PLATELET # BLD AUTO: 194 K/UL — SIGNIFICANT CHANGE UP (ref 150–400)
PLATELET MAPPING ACTF MAX AMPLITUDE: >30 MM — HIGH (ref 2–19)
PLATELET MAPPING ADP MAXIMUM AMPLITUDE: 49.7 MM — SIGNIFICANT CHANGE UP (ref 45–69)
PLATELET MAPPING ADP PERCENT INHIBITION: SIGNIFICANT CHANGE UP % (ref 0–17)
PLATELET MAPPING ARACHIDONIC ACID INHIBITION: SIGNIFICANT CHANGE UP % (ref 0–11)
PLATELET MAPPING HKH MAXIMUM AMPLITUDE: >71 MM — HIGH (ref 53–68)
PO2 BLDV: 54 MMHG — HIGH (ref 25–45)
POTASSIUM SERPL-MCNC: 4.3 MMOL/L — SIGNIFICANT CHANGE UP (ref 3.5–5.3)
POTASSIUM SERPL-MCNC: 4.5 MMOL/L — SIGNIFICANT CHANGE UP (ref 3.5–5.3)
POTASSIUM SERPL-SCNC: 4.3 MMOL/L — SIGNIFICANT CHANGE UP (ref 3.5–5.3)
POTASSIUM SERPL-SCNC: 4.5 MMOL/L — SIGNIFICANT CHANGE UP (ref 3.5–5.3)
PROT SERPL-MCNC: 4.9 G/DL — LOW (ref 6–8.3)
PROT SERPL-MCNC: 7.2 G/DL — SIGNIFICANT CHANGE UP (ref 6–8.3)
PROTHROM AB SERPL-ACNC: 16.4 SEC — HIGH (ref 9.9–13.4)
RAPIDTEG MAXIMUM AMPLITUDE: 54.5 MM — SIGNIFICANT CHANGE UP (ref 52–70)
RAPIDTEG MAXIMUM AMPLITUDE: 73.6 MM — HIGH (ref 52–70)
RBC # BLD: 3.13 M/UL — LOW (ref 3.8–5.2)
RBC # BLD: 3.21 M/UL — LOW (ref 3.8–5.2)
RBC # FLD: 14.6 % — HIGH (ref 10.3–14.5)
RBC # FLD: 15 % — HIGH (ref 10.3–14.5)
RBC BLD AUTO: SIGNIFICANT CHANGE UP
RSV RNA NPH QL NAA+NON-PROBE: SIGNIFICANT CHANGE UP
S AUREUS DNA NOSE QL NAA+PROBE: SIGNIFICANT CHANGE UP
SAO2 % BLDV: 86.8 % — SIGNIFICANT CHANGE UP (ref 67–88)
SARS-COV-2 RNA SPEC QL NAA+PROBE: SIGNIFICANT CHANGE UP
SODIUM SERPL-SCNC: 135 MMOL/L — SIGNIFICANT CHANGE UP (ref 135–145)
SODIUM SERPL-SCNC: 143 MMOL/L — SIGNIFICANT CHANGE UP (ref 135–145)
TEG FUNCTIONAL FIBRINOGEN: 20.7 MM — SIGNIFICANT CHANGE UP (ref 15–32)
TEG FUNCTIONAL FIBRINOGEN: >52 MM — HIGH (ref 15–32)
TEG MAXIMUM AMPLITUDE: 58.2 MM — SIGNIFICANT CHANGE UP (ref 52–69)
TEG MAXIMUM AMPLITUDE: 68.3 MM — SIGNIFICANT CHANGE UP (ref 52–69)
TEG REACTION TIME: 14.1 MIN — HIGH (ref 4.6–9.1)
TEG REACTION TIME: >17 MIN — HIGH (ref 4.6–9.1)
TROPONIN T, HIGH SENSITIVITY RESULT: 4330 NG/L — HIGH (ref 0–51)
WBC # BLD: 18.18 K/UL — HIGH (ref 3.8–10.5)
WBC # BLD: 40.71 K/UL — CRITICAL HIGH (ref 3.8–10.5)
WBC # FLD AUTO: 18.18 K/UL — HIGH (ref 3.8–10.5)
WBC # FLD AUTO: 40.71 K/UL — CRITICAL HIGH (ref 3.8–10.5)

## 2024-10-03 PROCEDURE — 33267 EXCL LAA OPEN ANY METHOD: CPT

## 2024-10-03 PROCEDURE — 33511 CABG VEIN TWO: CPT | Mod: AS

## 2024-10-03 PROCEDURE — 93010 ELECTROCARDIOGRAM REPORT: CPT

## 2024-10-03 PROCEDURE — 71045 X-RAY EXAM CHEST 1 VIEW: CPT | Mod: 26

## 2024-10-03 PROCEDURE — 99291 CRITICAL CARE FIRST HOUR: CPT

## 2024-10-03 PROCEDURE — 71045 X-RAY EXAM CHEST 1 VIEW: CPT | Mod: 26,77

## 2024-10-03 PROCEDURE — 33267 EXCL LAA OPEN ANY METHOD: CPT | Mod: AS

## 2024-10-03 PROCEDURE — 33511 CABG VEIN TWO: CPT

## 2024-10-03 PROCEDURE — 33508 ENDOSCOPIC VEIN HARVEST: CPT | Mod: 59

## 2024-10-03 PROCEDURE — 33533 CABG ARTERIAL SINGLE: CPT | Mod: AS

## 2024-10-03 PROCEDURE — 33533 CABG ARTERIAL SINGLE: CPT

## 2024-10-03 DEVICE — LIGATING CLIPS WECK HORIZON MEDIUM (BLUE) 24: Type: IMPLANTABLE DEVICE | Status: FUNCTIONAL

## 2024-10-03 DEVICE — SURGIFOAM PAD 8CM X 12.5CM X 10MM (100): Type: IMPLANTABLE DEVICE | Status: FUNCTIONAL

## 2024-10-03 DEVICE — CANNULA AORTIC PERFUSION EZ GLIDE STRAIGHT 21FR X 35CM VENTED: Type: IMPLANTABLE DEVICE | Status: FUNCTIONAL

## 2024-10-03 DEVICE — PACING WIRE WHITE M-25 WINGED WIRE 37MM X 89MM: Type: IMPLANTABLE DEVICE | Status: FUNCTIONAL

## 2024-10-03 DEVICE — CANNULA VESSEL 3MM BLUNT TIP CLEAR 1-WAY VALVE: Type: IMPLANTABLE DEVICE | Status: FUNCTIONAL

## 2024-10-03 DEVICE — CANNULA VENOUS 3 STAGE STANDARD 29/37/37FR X 1/2" NON-VENTED: Type: IMPLANTABLE DEVICE | Status: FUNCTIONAL

## 2024-10-03 DEVICE — ATRICLIP LAA EXCLUSION DEVICE 35MM FLEX-V: Type: IMPLANTABLE DEVICE | Status: FUNCTIONAL

## 2024-10-03 DEVICE — SURGICEL NU-KNIT 6 X 9": Type: IMPLANTABLE DEVICE | Status: FUNCTIONAL

## 2024-10-03 DEVICE — KIT CVC 2LUM MAC 9FR CHG: Type: IMPLANTABLE DEVICE | Status: FUNCTIONAL

## 2024-10-03 DEVICE — SURGICEL 2 X 14": Type: IMPLANTABLE DEVICE | Status: FUNCTIONAL

## 2024-10-03 DEVICE — CANNULA ATRASUMP 1/4" X 38CM: Type: IMPLANTABLE DEVICE | Status: FUNCTIONAL

## 2024-10-03 DEVICE — KIT A-LINE 1LUM 20G X 12CM SAFE KIT: Type: IMPLANTABLE DEVICE | Status: FUNCTIONAL

## 2024-10-03 DEVICE — PACING WIRE ORANGE M-25 WINGED WIRE 37MM X 89MM: Type: IMPLANTABLE DEVICE | Status: FUNCTIONAL

## 2024-10-03 DEVICE — CANNULA ANTEGRADE W/VENT 12GA: Type: IMPLANTABLE DEVICE | Status: FUNCTIONAL

## 2024-10-03 DEVICE — IMPLANTABLE DEVICE: Type: IMPLANTABLE DEVICE | Status: FUNCTIONAL

## 2024-10-03 DEVICE — LIGATING CLIPS WECK HORIZON SMALL-WIDE (RED) 24: Type: IMPLANTABLE DEVICE | Status: FUNCTIONAL

## 2024-10-03 DEVICE — CATH VENT LEFT HEART SILICONE 20FR NON-VENTED: Type: IMPLANTABLE DEVICE | Status: FUNCTIONAL

## 2024-10-03 RX ORDER — ALCOHOL ANTISEPTIC PADS
50 PADS, MEDICATED (EA) TOPICAL
Refills: 0 | Status: DISCONTINUED | OUTPATIENT
Start: 2024-10-03 | End: 2024-10-14

## 2024-10-03 RX ORDER — ACETAMINOPHEN 325 MG
1000 TABLET ORAL ONCE
Refills: 0 | Status: COMPLETED | OUTPATIENT
Start: 2024-10-03 | End: 2024-10-03

## 2024-10-03 RX ORDER — OXYCODONE HYDROCHLORIDE 30 MG/1
5 TABLET, FILM COATED, EXTENDED RELEASE ORAL EVERY 4 HOURS
Refills: 0 | Status: DISCONTINUED | OUTPATIENT
Start: 2024-10-03 | End: 2024-10-10

## 2024-10-03 RX ORDER — SODIUM CHLORIDE 0.9 % (FLUSH) 0.9 %
1000 SYRINGE (ML) INJECTION
Refills: 0 | Status: DISCONTINUED | OUTPATIENT
Start: 2024-10-03 | End: 2024-10-14

## 2024-10-03 RX ORDER — SOD PHOS DI, MONO/K PHOS MONO 250 MG
1 TABLET ORAL ONCE
Refills: 0 | Status: COMPLETED | OUTPATIENT
Start: 2024-10-03 | End: 2024-10-03

## 2024-10-03 RX ORDER — OXYCODONE HYDROCHLORIDE 30 MG/1
10 TABLET, FILM COATED, EXTENDED RELEASE ORAL EVERY 4 HOURS
Refills: 0 | Status: DISCONTINUED | OUTPATIENT
Start: 2024-10-03 | End: 2024-10-06

## 2024-10-03 RX ORDER — BISACODYL 5 MG/1
10 TABLET, COATED ORAL ONCE
Refills: 0 | Status: DISCONTINUED | OUTPATIENT
Start: 2024-10-05 | End: 2024-10-11

## 2024-10-03 RX ORDER — AMIODARONE HYDROCHLORIDE 50 MG/ML
400 INJECTION, SOLUTION INTRAVENOUS
Refills: 0 | Status: DISCONTINUED | OUTPATIENT
Start: 2024-10-03 | End: 2024-10-05

## 2024-10-03 RX ORDER — GABAPENTIN 800 MG/1
100 TABLET, FILM COATED ORAL EVERY 8 HOURS
Refills: 0 | Status: COMPLETED | OUTPATIENT
Start: 2024-10-03 | End: 2024-10-08

## 2024-10-03 RX ORDER — DOBUTAMINE HCL 250MG/20ML
1 VIAL (ML) INTRAVENOUS
Qty: 500 | Refills: 0 | Status: DISCONTINUED | OUTPATIENT
Start: 2024-10-03 | End: 2024-10-04

## 2024-10-03 RX ORDER — PIPERACILLIN SODIUM AND TAZOBACTAM SODIUM 12; 1.5 G/60ML; G/60ML
3.38 INJECTION, POWDER, LYOPHILIZED, FOR SOLUTION INTRAVENOUS ONCE
Refills: 0 | Status: COMPLETED | OUTPATIENT
Start: 2024-10-03 | End: 2024-10-03

## 2024-10-03 RX ORDER — CHLORHEXIDINE GLUCONATE ORAL RINSE 1.2 MG/ML
1 SOLUTION DENTAL DAILY
Refills: 0 | Status: DISCONTINUED | OUTPATIENT
Start: 2024-10-03 | End: 2024-10-14

## 2024-10-03 RX ORDER — ASPIRIN 325 MG
81 TABLET ORAL DAILY
Refills: 0 | Status: DISCONTINUED | OUTPATIENT
Start: 2024-10-03 | End: 2024-10-14

## 2024-10-03 RX ORDER — SENNOSIDES 8.6 MG
2 TABLET ORAL AT BEDTIME
Refills: 0 | Status: DISCONTINUED | OUTPATIENT
Start: 2024-10-04 | End: 2024-10-11

## 2024-10-03 RX ORDER — GUAIFENESIN 100 MG/5ML
100 SOLUTION ORAL EVERY 6 HOURS
Refills: 0 | Status: DISCONTINUED | OUTPATIENT
Start: 2024-10-03 | End: 2024-10-03

## 2024-10-03 RX ORDER — NOREPINEPHRINE BITARTRATE/D5W 16MG/250ML
0.05 PLASTIC BAG, INJECTION (ML) INTRAVENOUS
Qty: 8 | Refills: 0 | Status: DISCONTINUED | OUTPATIENT
Start: 2024-10-03 | End: 2024-10-04

## 2024-10-03 RX ORDER — VANCOMYCIN HCL-SODIUM CHLORIDE IV SOLN 1.5 GM/250ML-0.9% 1.5-0.9/25 GM/ML-%
1250 SOLUTION INTRAVENOUS ONCE
Refills: 0 | Status: COMPLETED | OUTPATIENT
Start: 2024-10-03 | End: 2024-10-03

## 2024-10-03 RX ORDER — PANTOPRAZOLE SODIUM 40 MG/1
40 TABLET, DELAYED RELEASE ORAL DAILY
Refills: 0 | Status: DISCONTINUED | OUTPATIENT
Start: 2024-10-03 | End: 2024-10-04

## 2024-10-03 RX ORDER — CHLORHEXIDINE GLUCONATE ORAL RINSE 1.2 MG/ML
15 SOLUTION DENTAL EVERY 12 HOURS
Refills: 0 | Status: DISCONTINUED | OUTPATIENT
Start: 2024-10-03 | End: 2024-10-04

## 2024-10-03 RX ORDER — ACETAMINOPHEN 325 MG
650 TABLET ORAL EVERY 6 HOURS
Refills: 0 | Status: DISCONTINUED | OUTPATIENT
Start: 2024-10-03 | End: 2024-10-05

## 2024-10-03 RX ORDER — DEXMEDETOMIDINE HYDROCHLORIDE IN 0.9% SODIUM CHLORIDE 400 UG/100ML
0.5 INJECTION INTRAVENOUS
Qty: 200 | Refills: 0 | Status: DISCONTINUED | OUTPATIENT
Start: 2024-10-03 | End: 2024-10-04

## 2024-10-03 RX ORDER — HYDROMORPHONE HYDROCHLORIDE 1 MG/ML
0.5 INJECTION, SOLUTION INTRAMUSCULAR; INTRAVENOUS; SUBCUTANEOUS EVERY 6 HOURS
Refills: 0 | Status: DISCONTINUED | OUTPATIENT
Start: 2024-10-03 | End: 2024-10-05

## 2024-10-03 RX ORDER — METOCLOPRAMIDE HCL 5 MG
10 TABLET ORAL EVERY 8 HOURS
Refills: 0 | Status: COMPLETED | OUTPATIENT
Start: 2024-10-03 | End: 2024-10-05

## 2024-10-03 RX ORDER — ALCOHOL ANTISEPTIC PADS
25 PADS, MEDICATED (EA) TOPICAL
Refills: 0 | Status: DISCONTINUED | OUTPATIENT
Start: 2024-10-03 | End: 2024-10-04

## 2024-10-03 RX ORDER — PIPERACILLIN SODIUM AND TAZOBACTAM SODIUM 12; 1.5 G/60ML; G/60ML
3.38 INJECTION, POWDER, LYOPHILIZED, FOR SOLUTION INTRAVENOUS EVERY 8 HOURS
Refills: 0 | Status: DISCONTINUED | OUTPATIENT
Start: 2024-10-03 | End: 2024-10-03

## 2024-10-03 RX ORDER — ASPIRIN 325 MG
300 TABLET ORAL ONCE
Refills: 0 | Status: COMPLETED | OUTPATIENT
Start: 2024-10-03

## 2024-10-03 RX ORDER — INSULIN REGULAR, HUMAN 100/ML
3 VIAL (ML) INJECTION
Qty: 100 | Refills: 0 | Status: DISCONTINUED | OUTPATIENT
Start: 2024-10-03 | End: 2024-10-08

## 2024-10-03 RX ADMIN — Medication 37.5 MICROGRAM(S): at 23:48

## 2024-10-03 RX ADMIN — Medication 400 MILLIGRAM(S): at 01:33

## 2024-10-03 RX ADMIN — Medication 2000 MILLIGRAM(S): at 06:52

## 2024-10-03 RX ADMIN — Medication 1000 MILLIGRAM(S): at 06:52

## 2024-10-03 RX ADMIN — DEXMEDETOMIDINE HYDROCHLORIDE IN 0.9% SODIUM CHLORIDE 9.94 MICROGRAM(S)/KG/HR: 400 INJECTION INTRAVENOUS at 21:31

## 2024-10-03 RX ADMIN — Medication 15.5 UNIT(S)/HR: at 02:12

## 2024-10-03 RX ADMIN — HYDROMORPHONE HYDROCHLORIDE 0.5 MILLIGRAM(S): 1 INJECTION, SOLUTION INTRAMUSCULAR; INTRAVENOUS; SUBCUTANEOUS at 23:30

## 2024-10-03 RX ADMIN — GUAIFENESIN 100 MILLIGRAM(S): 100 SOLUTION ORAL at 03:26

## 2024-10-03 RX ADMIN — Medication 100 MILLIEQUIVALENT(S): at 21:17

## 2024-10-03 RX ADMIN — Medication 100 MILLIGRAM(S): at 23:54

## 2024-10-03 RX ADMIN — Medication 125 MILLILITER(S): at 19:40

## 2024-10-03 RX ADMIN — Medication 75 MICROGRAM(S): at 05:59

## 2024-10-03 RX ADMIN — Medication 25 MILLIGRAM(S): at 05:59

## 2024-10-03 RX ADMIN — Medication 7.45 MICROGRAM(S)/KG/MIN: at 21:31

## 2024-10-03 RX ADMIN — Medication 125 MILLILITER(S): at 21:12

## 2024-10-03 RX ADMIN — VANCOMYCIN HCL-SODIUM CHLORIDE IV SOLN 1.5 GM/250ML-0.9% 166.67 MILLIGRAM(S): 1.5-0.9/25 SOLUTION at 03:13

## 2024-10-03 RX ADMIN — CHLORHEXIDINE GLUCONATE ORAL RINSE 1 APPLICATION(S): 1.2 SOLUTION DENTAL at 05:59

## 2024-10-03 RX ADMIN — Medication 1000 MILLIGRAM(S): at 06:55

## 2024-10-03 RX ADMIN — CHLORHEXIDINE GLUCONATE ORAL RINSE 30 MILLILITER(S): 1.2 SOLUTION DENTAL at 06:53

## 2024-10-03 RX ADMIN — HYDROMORPHONE HYDROCHLORIDE 0.5 MILLIGRAM(S): 1 INJECTION, SOLUTION INTRAMUSCULAR; INTRAVENOUS; SUBCUTANEOUS at 23:15

## 2024-10-03 RX ADMIN — PIPERACILLIN SODIUM AND TAZOBACTAM SODIUM 25 GRAM(S): 12; 1.5 INJECTION, POWDER, LYOPHILIZED, FOR SOLUTION INTRAVENOUS at 06:00

## 2024-10-03 RX ADMIN — PIPERACILLIN SODIUM AND TAZOBACTAM SODIUM 200 GRAM(S): 12; 1.5 INJECTION, POWDER, LYOPHILIZED, FOR SOLUTION INTRAVENOUS at 01:50

## 2024-10-03 RX ADMIN — Medication 4.77 MICROGRAM(S)/KG/MIN: at 23:47

## 2024-10-03 RX ADMIN — Medication 125 MILLILITER(S): at 21:14

## 2024-10-03 RX ADMIN — Medication 100 MILLIEQUIVALENT(S): at 22:08

## 2024-10-03 RX ADMIN — Medication 1000 MILLIGRAM(S): at 02:17

## 2024-10-03 RX ADMIN — Medication 1 TABLET(S): at 06:00

## 2024-10-03 RX ADMIN — GABAPENTIN 200 MILLIGRAM(S): 800 TABLET, FILM COATED ORAL at 06:53

## 2024-10-03 RX ADMIN — Medication 100 MILLIEQUIVALENT(S): at 23:10

## 2024-10-03 RX ADMIN — Medication 3 UNIT(S)/HR: at 21:09

## 2024-10-03 NOTE — PROGRESS NOTE ADULT - ATTENDING COMMENTS
NSTEMI complicated by hypotension  IABP placed for hemodynamic support and coronary perfusion  Multivessel coronary artery disease  Plan for CABG per CT surgery - will trend P2Y12 .
NSTEMI complicated by hypotension  IABP placed for hemodynamic support and coronary perfusion  Multivessel coronary artery disease  Plan for CABG per CT surgery - will trend P2Y12 .

## 2024-10-03 NOTE — BRIEF OPERATIVE NOTE - COMMENTS
No unexpected foreign bodies were apparent on preliminary review of post-op x-ray. Reviewed by Dr. Martini.   EBL not accurate due to use of cardiotomy and cell saver suction on CPB.   transferred to CTU on  2, levo 0.03, Precedex 0.5.

## 2024-10-03 NOTE — BRIEF OPERATIVE NOTE - ANTIBIOTIC PROTOCOL
Assessment:  46 y o  [id number] who presents with AUB/pelvic pain and BV  Plan:  Diagnoses and all orders for this visit:    Pelvic pain  Uterine leiomyoma, unspecified location  Abnormal uterine bleeding (AUB)  -     CBC and differential; Future  -     Follicle stimulating hormone; Future  -     Luteinizing hormone; Future        -     Estradiol; Future  -     US pelvis complete w transvaginal; Future  -     Chlamydia/GC amplified DNA by PCR    Recurrent vaginitis  -     HIV 1/2 ANTIGEN/ANTIBODY (4TH GENERATION) W REFLEX [facility]; Future  -     RPR; Future  -     CBC and differential; Future  -     HEMOGLOBIN A1C W/ EAG ESTIMATION; Future  -     Follicle stimulating hormone; Future  -     Luteinizing hormone; Future  -     Estradiol; Future  -     Chlamydia/GC amplified DNA by PCR    Bacterial vaginosis  -     metroNIDAZOLE (METROGEL) 0 75 % vaginal gel; Insert 1 application into the vagina daily at bedtime for 5 days    Antibiotic-induced yeast infection  -     fluconazole (DIFLUCAN) 150 mg tablet; Take 1 tablet (150 mg total) by mouth once for 1 dose          __________________________________________________________________    Subjective   Suraj Mata is a 46 y o  [id number] who presents with AUB and pelvic pain  May had menses x2wks  Then got a yeast infection, treated with OTC monistat  Resolved, but recurred after next menses  Hx of recurrent infections when she was younger, but has been OK for a few years  Similar noted now  Vulvar discomfort  +discharge  No urinary or bowel changes  Sensitive to everything --  soaps/lubes/lotions, hot tubs  Tried coconut oil as lubricant  Also with bleeding changes and pelvic discomfort  Described as bloating/gassiness  Occasionally sharp  Feels line pain in her ovary  No discharge/rashes/lesions  No bowel/bladder changes  Hx cysts  Irregular menses years ago  Started on a pill but didn't recur until recently           The following portions of the patient's history were reviewed and updated as appropriate: allergies, current medications, past medical history, past social history, past surgical history and problem list     Review of Systems  Review of Systems   Constitutional: Negative for chills, fatigue and fever  Gastrointestinal: Negative for abdominal distention, abdominal pain and nausea  Genitourinary: Positive for pelvic pain, vaginal discharge and vaginal pain  Negative for dysuria, flank pain, frequency, genital sores, urgency and vaginal bleeding  Skin: Negative for rash and wound  Neurological: Negative for dizziness, light-headedness and headaches  Objective  /70   Ht 5' 6" (1 676 m)   Wt 65 3 kg (144 lb)   BMI 23 24 kg/m²      Physical Exam:  Physical Exam  Exam conducted with a chaperone present  Constitutional:       General: She is not in acute distress  Appearance: Normal appearance  She is not ill-appearing, toxic-appearing or diaphoretic  Eyes:      General: No scleral icterus  Right eye: No discharge  Left eye: No discharge  Conjunctiva/sclera: Conjunctivae normal    Cardiovascular:      Rate and Rhythm: Normal rate  Pulmonary:      Effort: Pulmonary effort is normal  No respiratory distress  Abdominal:      General: There is no distension  Palpations: There is no mass  Tenderness: There is no abdominal tenderness  There is no guarding or rebound  Hernia: No hernia is present  Genitourinary:     General: Normal vulva  Exam position: Lithotomy position  Labia:         Right: No rash, tenderness or lesion  Left: No rash, tenderness or lesion  Urethra: No prolapse, urethral swelling or urethral lesion  Vagina: No signs of injury  Vaginal discharge and erythema present  No tenderness or bleeding  Cervix: No cervical motion tenderness, friability, lesion, erythema or cervical bleeding  Uterus: Enlarged (10-12w)   Not deviated, not fixed, not tender and no uterine prolapse  Adnexa:         Right: No mass, tenderness or fullness  Left: No mass, tenderness or fullness  Musculoskeletal:         General: No swelling  Skin:     General: Skin is warm and dry  Coloration: Skin is not jaundiced or pale  Findings: No bruising or erythema  Neurological:      Mental Status: She is alert  Psychiatric:         Mood and Affect: Mood normal          Behavior: Behavior normal          Thought Content:  Thought content normal          Judgment: Judgment normal            Microscopy consistent with BV    Reviewed:  US pelvis 12/30/2016, 9/2/2016 Followed protocol

## 2024-10-03 NOTE — PROGRESS NOTE ADULT - SUBJECTIVE AND OBJECTIVE BOX
Patient is a 74y old  Female who presents with a chief complaint of CP (02 Oct 2024 20:30)    INTERVAL HISTORY:  - febrile to 101.5F overnight, infectious workup sent, empiric abx coverage started    SUBJECTIVE  - Patient seen and evaluated at bedside.     MEDICATIONS:  MEDICATIONS  (STANDING):  aspirin  chewable 81 milliGRAM(s) Oral daily  atorvastatin 80 milliGRAM(s) Oral at bedtime  cefuroxime  IVPB 1500 milliGRAM(s) IV Intermittent once  chlorhexidine 0.12% Liquid 30 milliLiter(s) Swish and Spit once  chlorhexidine 2% Cloths 1 Application(s) Topical <User Schedule>  heparin  Infusion 900 Unit(s)/Hr (15.5 mL/Hr) IV Continuous <Continuous>  insulin lispro (ADMELOG) corrective regimen sliding scale   SubCutaneous three times a day before meals  insulin lispro (ADMELOG) corrective regimen sliding scale   SubCutaneous at bedtime  levothyroxine 75 MICROGram(s) Oral daily  metoprolol tartrate 25 milliGRAM(s) Oral two times a day  piperacillin/tazobactam IVPB.. 3.375 Gram(s) IV Intermittent every 8 hours  traZODone 50 milliGRAM(s) Oral at bedtime    MEDICATIONS  (PRN):  guaiFENesin Oral Liquid (Sugar-Free) 100 milliGRAM(s) Oral every 6 hours PRN Cough    OBJECTIVE:  ICU Vital Signs Last 24 Hrs  T(C): 37.4 (03 Oct 2024 06:00), Max: 38.6 (03 Oct 2024 01:00)  T(F): 99.3 (03 Oct 2024 06:00), Max: 101.5 (03 Oct 2024 01:00)  HR: 95 (03 Oct 2024 07:00) (73 - 97)  RR: 20 (03 Oct 2024 07:00) (20 - 31)  SpO2: 94% (03 Oct 2024 07:00) (93% - 99%)    O2 Parameters below as of 03 Oct 2024 07:00  Patient On (Oxygen Delivery Method): room air    CAPILLARY BLOOD GLUCOSE  POCT Blood Glucose.: 172 mg/dL (02 Oct 2024 21:14)  POCT Blood Glucose.: 171 mg/dL (02 Oct 2024 17:09)  POCT Blood Glucose.: 171 mg/dL (02 Oct 2024 11:49)    CAPILLARY BLOOD GLUCOSE  POCT Blood Glucose.: 172 mg/dL (02 Oct 2024 21:14)    I&O's Summary  02 Oct 2024 07:01  -  03 Oct 2024 07:00  --------------------------------------------------------  IN: 1996 mL / OUT: 965 mL / NET: 1031 mL    PHYSICAL EXAM:  General: NAD  Chest: Clear to auscultation bilaterally  Heart: Regular rate and rhythm; normal S1 and S2  Abd: Soft, nontender, nondistended  Nervous System: AAOX3  Ext: no peripheral LE edema bilaterally    LABS:                   8.5    18.18 )-----------( 194      ( 03 Oct 2024 01:28 )             26.7     10-03    135  |  99  |  25[H]  ----------------------------<  194[H]  4.3   |  21[L]  |  1.06    Ca    8.8      03 Oct 2024 01:28  Phos  2.2     10-03  Mg     2.1     10-03    TPro  7.2  /  Alb  3.1[L]  /  TBili  0.6  /  DBili  x   /  AST  51[H]  /  ALT  42  /  AlkPhos  139[H]  10-03    LIVER FUNCTIONS - ( 03 Oct 2024 01:28 )  Alb: 3.1 g/dL / Pro: 7.2 g/dL / ALK PHOS: 139 U/L / ALT: 42 U/L / AST: 51 U/L / GGT: x           PTT - ( 03 Oct 2024 01:28 )  PTT:56.9 sec  Urinalysis Basic - ( 03 Oct 2024 01:28 )    Color: x / Appearance: x / SG: x / pH: x  Gluc: 194 mg/dL / Ketone: x  / Bili: x / Urobili: x   Blood: x / Protein: x / Nitrite: x   Leuk Esterase: x / RBC: x / WBC x   Sq Epi: x / Non Sq Epi: x / Bacteria: x    Assessment: 74F PMHx hypothyroidism, CVA x2 (2018, no residual deficits), HTN, HLD, DM who initially p/t [employer] complaining of back pain, SOB, N/V and CP, transferred to [facility] with c/f NSTEMI underwent R/LHC and found to have severe LM, LAD disease, moderate pRCA disease. IABP placed for coronary perfusion, pending CABG.    Plan:  NEURO  A&Ox3  - continue to monitor mental status as per protocol     RESPIRATORY  Stable on room air  - intermittently requiring 2L NC overnight   - continue to monitor SpO2 with goal >94%    CARDIO  #Multivessel CAD  - LM, Cx, RCA disease seen on LHC, IABP placed for coronary perfusion  - continue heparin gtt for IABP, daily CXR while in place  - c/w ASA  - initially loaded with Brilinta for DAPT, holding P2Y12 pending CABG  - GDMT: Lopressor 25BID   - NPO MN for CABG with Pupovac    RENAL/  No active issues  - Continue monitoring urine output, lytes, SCr/ BUN  - replete lytes prn with goal K >4 and Mg >2    GI  No active issues    ENDO  DM  - on 46u lantus at home  - mISS    HEME  - Monitor H/H and plts  - DVT PPX: heparin gtt    ID  #UA+  - febrile overnight to 101.5F  - ABx broadened to Zosyn & Vanco from CTX  - monitor and trend WBC and temperature curve     Dispo: Maintain in ICU.    Patient requires continuous monitoring with bedside rhythm monitoring, arterial line, pulse oximetry, ventilator monitoring and intermittent blood gas analysis.  Care plan discussed with ICU care team.  I have spent 35 minutes providing critical care, in addition to initial critical time provided by CICU attending Dr. Steiner, re-evaluated multiple times during the day.    Cathy Andrews PA-C

## 2024-10-03 NOTE — PROGRESS NOTE ADULT - SUBJECTIVE AND OBJECTIVE BOX
Patient seen and examined at the bedside.    Remained critically ill on continuous ICU monitoring.    OBJECTIVE:  Vital Signs Last 24 Hrs  T(C): 37.3 (03 Oct 2024 10:15), Max: 38.6 (03 Oct 2024 01:00)  T(F): 99.1 (03 Oct 2024 10:00), Max: 101.5 (03 Oct 2024 01:00)  HR: 94 (03 Oct 2024 11:00) (76 - 101)  BP: 120/82 (03 Oct 2024 10:15) (120/82 - 120/82)  BP(mean): 97 (03 Oct 2024 10:15) (97 - 97)  RR: 20 (03 Oct 2024 11:00) (20 - 26)  SpO2: 97% (03 Oct 2024 11:00) (90% - 99%)    Parameters below as of 03 Oct 2024 11:00  Patient On (Oxygen Delivery Method): nasal cannula  O2 Flow (L/min): 2      Physical Exam:  General: intubated multiple lines gtt & tubes   Neurology: sedated   Eyes: bilaeral pupils equal and reactive   ENT/Neck: +ETT midline, Neck supple, trachea midline, No JVD   Respiratory: Rales noted bilaterally   CV: RRR, S1S2, no murmurs        [x] Sternal dressing, [x] Mediastinal CTx2, [x] Pleural CTx2        [x] Sinus rhythm, [x] Temporary pacing- VVI- 60  Abdominal: Soft, NT, ND +BS,   Extremities: 1-2+ pedal edema noted, + peripheral pulses   Skin: No Rashes, Hematoma, Ecchymosis                           Assessment:    Plan:   ***Neuro***  [] Hx of CVA   [] Nonfocal  [] Sedated with [] Diprivan [] Precedex [] NMB   Pain management with Gabapentin, Oxycodone, and Dilaudid   Post operative neuro assessment     ***Cardiovascular***  Labile hemodynamics large volume resuscitation dual pressor + inotrope  Invasive hemodynamic monitoring, assess perfusion indices   SR / CVP 4-12 / MAP 70-75 / SvO2 ___ / Hct 26.6 / Lactate 1.6  [x] Rt fem IABP with good 1:1 diastolic augmentation   Monitor RLE for any bleeding or ischemic complication  Volume: [x] CTU- [x] Albumin 250 cc OR- [x] 4 prbc [x] 1 cyro [x] 1 plt [x] 1 ffp                F/u CBC/ INR/ Fibrinogen  Reassessment of hemodynamics post resuscitation   Monitor chest tube outputs   [x] Nonbleeding ___   [x] Amiodarone for AF prophylaxis   [x] ASA  [x] K> 4 Mg >2   Serial EKG and cardiac enzymes     ***Pulmonary***  Post op vent management  Titration of FiO2 and PEEP, follow SpO2, CXR, blood gases                 Will plan to wean & extubate once pt is hemodynamically stable and not bleeding    ***GI***  NPO   [x] Protonix   Reglan for gut motility     ***Renal***  GFR 88  Continue to monitor I/Os, BUN/Creatinine.   Replete lytes PRN  Beck present [x] positive     ***ID***  Perioperative coverage with Cefuroxime     ***Endocrine***  [x] Stress Hyperglycemia: HbA1c 6.8%             - [x] Insulin gtt             - Need tight glycemic control to prevent wound infection.          Patient requires continuous monitoring with bedside rhythm monitoring, pulse oximetry monitoring, and continuous central venous and arterial pressure monitoring; and intermittent blood gas analysis. Care plan discussed with the ICU care team.   Patient remained critical, at risk for life threatening decompensation.    I have spent 40 minutes providing critical care management to this patient.    By signing my name below, I, Shari Duong, attest that this documentation has been prepared under the direction and in the presence of Kimberly Galvez MD   Electronically signed: Gena Mendoza, 10-03-24 @ 20:23    I, Kimberly Galvez, personally performed the services described in this documentation. all medical record entries made by the jaimieibfifi were at my direction and in my presence. I have reviewed the chart and agree that the record reflects my personal performance and is accurate and complete  Electronically signed: Kimberly Galvez MD        Patient seen and examined at the bedside.    Remained critically ill on continuous ICU monitoring.    OBJECTIVE:  Vital Signs Last 24 Hrs  T(C): 37.3 (03 Oct 2024 10:15), Max: 38.6 (03 Oct 2024 01:00)  T(F): 99.1 (03 Oct 2024 10:00), Max: 101.5 (03 Oct 2024 01:00)  HR: 94 (03 Oct 2024 11:00) (76 - 101)  BP: 120/82 (03 Oct 2024 10:15) (120/82 - 120/82)  BP(mean): 97 (03 Oct 2024 10:15) (97 - 97)  RR: 20 (03 Oct 2024 11:00) (20 - 26)  SpO2: 97% (03 Oct 2024 11:00) (90% - 99%)    Parameters below as of 03 Oct 2024 11:00  Patient On (Oxygen Delivery Method): nasal cannula  O2 Flow (L/min): 2      Physical Exam:  General: intubated multiple lines gtt & tubes   Neurology: Nonfocal   Eyes: bilateral pupils equal and reactive   ENT/Neck: +ETT midline, Neck supple, trachea midline, No JVD   Respiratory: Rales noted bilaterally   CV: RRR, S1S2, no murmurs        [x] Sternal dressing, [x] Mediastinal CTx1, [x] Pleural CTx2 [x] KYAW Drain        [x] Sinus rhythm, [x] Temporary pacing- VVI- 60  Abdominal: Soft, NT, ND +BS,   Extremities: 1-2+ pedal edema noted, + peripheral pulses   Skin: No Rashes, Hematoma, Ecchymosis                           Assessment:    Plan:   ***Neuro***  [x] Nonfocal    Pain management with Gabapentin, Oxycodone, and Dilaudid   Post operative neuro assessment     ***Cardiovascular***  Labile hemodynamics large volume resuscitation dual pressor + inotrope  Invasive hemodynamic monitoring, assess perfusion indices   SR / CVP 4-12 / MAP 70-75 / SvO2 ___ / Hct 26.6 / Lactate 1.6  [x] Rt fem IABP with good 1:1 diastolic augmentation   Monitor RLE for any bleeding or ischemic complication  Volume: [x] CTU- [x] Albumin 250 cc x2 OR- [x] 4 prbc [x] 1 cyro [x] 1 plt [x] 1 ffp                F/u CBC/ INR/ Fibrinogen  Reassessment of hemodynamics post resuscitation   Monitor chest tube outputs   [x] Nonbleeding ___   [x] Amiodarone for AF prophylaxis   [x] ASA  [x] K> 4 Mg >2   Serial EKG and cardiac enzymes     ***Pulmonary***  Post op vent management  AC/ CMV / RR 12/ FiO2 100/ PEEP 5  Titration of FiO2 and PEEP, follow SpO2, CXR, blood gases               Will plan to wean & extubate once pt is hemodynamically stable and not bleeding    ***GI***  NPO   [x] Protonix   Reglan for gut motility     ***Renal***  GFR 88  Continue to monitor I/Os, BUN/Creatinine.   Replete lytes PRN  Beck present [x] positive     ***ID***  Perioperative coverage with Cefuroxime     ***Endocrine***  [x] Stress Hyperglycemia: HbA1c 6.8%             - [x] Insulin gtt             - Need tight glycemic control to prevent wound infection.          Patient requires continuous monitoring with bedside rhythm monitoring, pulse oximetry monitoring, and continuous central venous and arterial pressure monitoring; and intermittent blood gas analysis. Care plan discussed with the ICU care team.   Patient remained critical, at risk for life threatening decompensation.    I have spent 40 minutes providing critical care management to this patient.    By signing my name below, I, Shari Duong, attest that this documentation has been prepared under the direction and in the presence of Kimberly Galvez MD   Electronically signed: Gena Mendoza, 10-03-24 @ 20:23    I, Kimberly Galvez, personally performed the services described in this documentation. all medical record entries made by the jaimieibe were at my direction and in my presence. I have reviewed the chart and agree that the record reflects my personal performance and is accurate and complete  Electronically signed: Kimberly Galvez MD            Patient seen and examined at the bedside.    Remained critically ill on continuous ICU monitoring.    OBJECTIVE:  Vital Signs Last 24 Hrs  T(C): 37.3 (03 Oct 2024 10:15), Max: 38.6 (03 Oct 2024 01:00)  T(F): 99.1 (03 Oct 2024 10:00), Max: 101.5 (03 Oct 2024 01:00)  HR: 94 (03 Oct 2024 11:00) (76 - 101)  BP: 120/82 (03 Oct 2024 10:15) (120/82 - 120/82)  BP(mean): 97 (03 Oct 2024 10:15) (97 - 97)  RR: 20 (03 Oct 2024 11:00) (20 - 26)  SpO2: 97% (03 Oct 2024 11:00) (90% - 99%)    Parameters below as of 03 Oct 2024 11:00  Patient On (Oxygen Delivery Method): nasal cannula  O2 Flow (L/min): 2      Physical Exam:  General: intubated multiple lines gtt & tubes   Neurology: sedated    Eyes: bilateral pupils equal and reactive   ENT/Neck: +ETT midline, Neck supple, trachea midline, No JVD   Respiratory: Rales noted bilaterally   CV: RRR, S1S2, no murmurs        [x] Sternal dressing, [x] Mediastinal CTx1, [x] Pleural CTx2         [x] Sinus rhythm, [x] Temporary pacing- VVI- 60  Abdominal: Soft, NT, ND +BS,   Extremities: 1-2+ pedal edema noted, + peripheral pulses                     RFA IABP   Skin: No Rashes, Hematoma, Ecchymosis                           Assessment:  74 yr female BMI 25 / DM2 / hypothyroidism / HLD / HTN / CVA X2   NSTE ACS > cardiac cath LM disease RFA IABP placed for critical coronary anatomy     S/P CABG X 3   Post op hypotension related to hypovolemia & cardiac dysfunction  Anemia   Hyperglycemia   Preop +UA     Plan:   ***Neuro***  [x] H/O CVA w left sided weakness          Post operative neuro assessment        Hold Dex reassess       Pain management with Gabapentin, and Dilaudid        ***Cardiovascular***  Labile hemodynamics  volume resuscitation  pressor + inotrope  Invasive hemodynamic monitoring, assess serial perfusion indices    / CVP 2-6  / MAP 55-70  / SvO2 86 / Hct 26.6 / Lactate 1.6  [x] Epicardial back up pacing VVI 60/10   [x] Rt fem IABP with  1:1 diastolic augmentation        Monitor RLE for any bleeding or ischemic complication  [X] Dobutamine 2 mcg/kg/min   [x] Levophed 0.06-0.08 mcg/kg/min Target MAP > 70  Volume:  OR- [x] 4 prbc [x] 1 Cryo [x] 1 plt [x] 1 Plasma                  CTU [X] Albumin 5% 1000 cc                        [x] PRBC X1                        F/u CBC/ INR/ Fibrinogen                       Monitor chest tube outputs   [x] Amiodarone for AF prophylaxis   [x] ASA  [x] K> 4 Mg >2   Serial EKG and cardiac enzymes     ***Pulmonary***  Post op vent management  AC/ CMV / RR 12/ FiO2 100/ PEEP 5  Titration of FiO2 and PEEP, follow SpO2, CXR, blood gases               Will plan to wean & extubate once pt is hemodynamically stable and not bleeding    ***GI***  NPO   [x] Protonix   Reglan for gut motility     ***Renal***  GFR 88  Continue to monitor I/Os, BUN/Creatinine.   Replete lytes PRN  Beck present [x] positive     ***ID***  Perioperative coverage with Cefuroxime     ***Endocrine***  [x]          Hyperglycemia: HbA1c 6.8%             - [x] Insulin gtt             - Need tight glycemic control to prevent wound infection.                     Patient requires continuous monitoring with bedside rhythm monitoring, pulse oximetry monitoring, and continuous central venous and arterial pressure monitoring; and intermittent blood gas analysis. Care plan discussed with the ICU care team.   Patient remained critical, at risk for life threatening decompensation.    I have spent 40 minutes providing critical care management to this patient.    By signing my name below, I, Shari Duong, attest that this documentation has been prepared under the direction and in the presence of Kimberly Galvez MD   Electronically signed: Gena Mendoza, 10-03-24 @ 20:23    I, Kimberly Galvez, personally performed the services described in this documentation. all medical record entries made by the jaimieibfifi were at my direction and in my presence. I have reviewed the chart and agree that the record reflects my personal performance and is accurate and complete  Electronically signed: Kimberly Galvez MD

## 2024-10-04 LAB
ALBUMIN SERPL ELPH-MCNC: 3.6 G/DL — SIGNIFICANT CHANGE UP (ref 3.3–5)
ALP SERPL-CCNC: 59 U/L — SIGNIFICANT CHANGE UP (ref 40–120)
ALT FLD-CCNC: 23 U/L — SIGNIFICANT CHANGE UP (ref 10–45)
ANION GAP SERPL CALC-SCNC: 13 MMOL/L — SIGNIFICANT CHANGE UP (ref 5–17)
APTT BLD: 29.9 SEC — SIGNIFICANT CHANGE UP (ref 24.5–35.6)
AST SERPL-CCNC: 72 U/L — HIGH (ref 10–40)
BASE EXCESS BLDV CALC-SCNC: -0.4 MMOL/L — SIGNIFICANT CHANGE UP (ref -2–3)
BASE EXCESS BLDV CALC-SCNC: -1.9 MMOL/L — SIGNIFICANT CHANGE UP (ref -2–3)
BASE EXCESS BLDV CALC-SCNC: -2 MMOL/L — SIGNIFICANT CHANGE UP (ref -2–3)
BASE EXCESS BLDV CALC-SCNC: -3.1 MMOL/L — LOW (ref -2–3)
BASE EXCESS BLDV CALC-SCNC: -3.4 MMOL/L — LOW (ref -2–3)
BASE EXCESS BLDV CALC-SCNC: -5.3 MMOL/L — LOW (ref -2–3)
BASOPHILS # BLD AUTO: 0.1 K/UL — SIGNIFICANT CHANGE UP (ref 0–0.2)
BASOPHILS NFR BLD AUTO: 0.6 % — SIGNIFICANT CHANGE UP (ref 0–2)
BILIRUB SERPL-MCNC: 1.7 MG/DL — HIGH (ref 0.2–1.2)
BUN SERPL-MCNC: 15 MG/DL — SIGNIFICANT CHANGE UP (ref 7–23)
CALCIUM SERPL-MCNC: 9 MG/DL — SIGNIFICANT CHANGE UP (ref 8.4–10.5)
CHLORIDE SERPL-SCNC: 108 MMOL/L — SIGNIFICANT CHANGE UP (ref 96–108)
CK MB BLD-MCNC: 13.5 % — HIGH (ref 0–3.5)
CK MB CFR SERPL CALC: 66.6 NG/ML — HIGH (ref 0–3.8)
CK SERPL-CCNC: 492 U/L — HIGH (ref 25–170)
CO2 BLDV-SCNC: 24 MMOL/L — SIGNIFICANT CHANGE UP (ref 22–26)
CO2 BLDV-SCNC: 25 MMOL/L — SIGNIFICANT CHANGE UP (ref 22–26)
CO2 BLDV-SCNC: 25 MMOL/L — SIGNIFICANT CHANGE UP (ref 22–26)
CO2 BLDV-SCNC: 26 MMOL/L — SIGNIFICANT CHANGE UP (ref 22–26)
CO2 BLDV-SCNC: 26 MMOL/L — SIGNIFICANT CHANGE UP (ref 22–26)
CO2 BLDV-SCNC: 27 MMOL/L — HIGH (ref 22–26)
CO2 SERPL-SCNC: 22 MMOL/L — SIGNIFICANT CHANGE UP (ref 22–31)
CREAT SERPL-MCNC: 0.79 MG/DL — SIGNIFICANT CHANGE UP (ref 0.5–1.3)
CULTURE RESULTS: SIGNIFICANT CHANGE UP
EGFR: 78 ML/MIN/1.73M2 — SIGNIFICANT CHANGE UP
EOSINOPHIL # BLD AUTO: 0 K/UL — SIGNIFICANT CHANGE UP (ref 0–0.5)
EOSINOPHIL NFR BLD AUTO: 0 % — SIGNIFICANT CHANGE UP (ref 0–6)
FIBRINOGEN PPP-MCNC: 330 MG/DL — SIGNIFICANT CHANGE UP (ref 200–445)
GAS PNL BLDA: SIGNIFICANT CHANGE UP
GAS PNL BLDV: SIGNIFICANT CHANGE UP
GLUCOSE BLDC GLUCOMTR-MCNC: 109 MG/DL — HIGH (ref 70–99)
GLUCOSE BLDC GLUCOMTR-MCNC: 110 MG/DL — HIGH (ref 70–99)
GLUCOSE BLDC GLUCOMTR-MCNC: 115 MG/DL — HIGH (ref 70–99)
GLUCOSE BLDC GLUCOMTR-MCNC: 118 MG/DL — HIGH (ref 70–99)
GLUCOSE BLDC GLUCOMTR-MCNC: 134 MG/DL — HIGH (ref 70–99)
GLUCOSE BLDC GLUCOMTR-MCNC: 136 MG/DL — HIGH (ref 70–99)
GLUCOSE BLDC GLUCOMTR-MCNC: 141 MG/DL — HIGH (ref 70–99)
GLUCOSE BLDC GLUCOMTR-MCNC: 145 MG/DL — HIGH (ref 70–99)
GLUCOSE BLDC GLUCOMTR-MCNC: 146 MG/DL — HIGH (ref 70–99)
GLUCOSE BLDC GLUCOMTR-MCNC: 165 MG/DL — HIGH (ref 70–99)
GLUCOSE BLDC GLUCOMTR-MCNC: 169 MG/DL — HIGH (ref 70–99)
GLUCOSE BLDC GLUCOMTR-MCNC: 178 MG/DL — HIGH (ref 70–99)
GLUCOSE BLDC GLUCOMTR-MCNC: 184 MG/DL — HIGH (ref 70–99)
GLUCOSE BLDC GLUCOMTR-MCNC: 219 MG/DL — HIGH (ref 70–99)
GLUCOSE SERPL-MCNC: 126 MG/DL — HIGH (ref 70–99)
HCO3 BLDV-SCNC: 22 MMOL/L — SIGNIFICANT CHANGE UP (ref 22–29)
HCO3 BLDV-SCNC: 24 MMOL/L — SIGNIFICANT CHANGE UP (ref 22–29)
HCO3 BLDV-SCNC: 25 MMOL/L — SIGNIFICANT CHANGE UP (ref 22–29)
HCT VFR BLD CALC: 24.1 % — LOW (ref 34.5–45)
HGB BLD-MCNC: 8.3 G/DL — LOW (ref 11.5–15.5)
HOROWITZ INDEX BLDV+IHG-RTO: 44 — SIGNIFICANT CHANGE UP
HOROWITZ INDEX BLDV+IHG-RTO: 50 — SIGNIFICANT CHANGE UP
IMM GRANULOCYTES NFR BLD AUTO: 5.6 % — HIGH (ref 0–0.9)
INR BLD: 1.32 RATIO — HIGH (ref 0.85–1.16)
LYMPHOCYTES # BLD AUTO: 0.89 K/UL — LOW (ref 1–3.3)
LYMPHOCYTES # BLD AUTO: 5 % — LOW (ref 13–44)
MAGNESIUM SERPL-MCNC: 2.8 MG/DL — HIGH (ref 1.6–2.6)
MCHC RBC-ENTMCNC: 29.3 PG — SIGNIFICANT CHANGE UP (ref 27–34)
MCHC RBC-ENTMCNC: 34.4 GM/DL — SIGNIFICANT CHANGE UP (ref 32–36)
MCV RBC AUTO: 85.2 FL — SIGNIFICANT CHANGE UP (ref 80–100)
MONOCYTES # BLD AUTO: 1.27 K/UL — HIGH (ref 0–0.9)
MONOCYTES NFR BLD AUTO: 7.1 % — SIGNIFICANT CHANGE UP (ref 2–14)
NEUTROPHILS # BLD AUTO: 14.64 K/UL — HIGH (ref 1.8–7.4)
NEUTROPHILS NFR BLD AUTO: 81.7 % — HIGH (ref 43–77)
NRBC # BLD: 0 /100 WBCS — SIGNIFICANT CHANGE UP (ref 0–0)
PCO2 BLDV: 43 MMHG — HIGH (ref 39–42)
PCO2 BLDV: 45 MMHG — HIGH (ref 39–42)
PCO2 BLDV: 46 MMHG — HIGH (ref 39–42)
PCO2 BLDV: 49 MMHG — HIGH (ref 39–42)
PCO2 BLDV: 49 MMHG — HIGH (ref 39–42)
PCO2 BLDV: 50 MMHG — HIGH (ref 39–42)
PH BLDV: 7.26 — LOW (ref 7.32–7.43)
PH BLDV: 7.29 — LOW (ref 7.32–7.43)
PH BLDV: 7.29 — LOW (ref 7.32–7.43)
PH BLDV: 7.33 — SIGNIFICANT CHANGE UP (ref 7.32–7.43)
PH BLDV: 7.35 — SIGNIFICANT CHANGE UP (ref 7.32–7.43)
PH BLDV: 7.36 — SIGNIFICANT CHANGE UP (ref 7.32–7.43)
PHOSPHATE SERPL-MCNC: 2.6 MG/DL — SIGNIFICANT CHANGE UP (ref 2.5–4.5)
PLATELET # BLD AUTO: 101 K/UL — LOW (ref 150–400)
PO2 BLDV: 31 MMHG — SIGNIFICANT CHANGE UP (ref 25–45)
PO2 BLDV: 35 MMHG — SIGNIFICANT CHANGE UP (ref 25–45)
PO2 BLDV: 41 MMHG — SIGNIFICANT CHANGE UP (ref 25–45)
PO2 BLDV: 45 MMHG — SIGNIFICANT CHANGE UP (ref 25–45)
PO2 BLDV: 49 MMHG — HIGH (ref 25–45)
PO2 BLDV: 56 MMHG — HIGH (ref 25–45)
POTASSIUM SERPL-MCNC: 4.5 MMOL/L — SIGNIFICANT CHANGE UP (ref 3.5–5.3)
POTASSIUM SERPL-SCNC: 4.5 MMOL/L — SIGNIFICANT CHANGE UP (ref 3.5–5.3)
PROT SERPL-MCNC: 5.2 G/DL — LOW (ref 6–8.3)
PROTHROM AB SERPL-ACNC: 15.1 SEC — HIGH (ref 9.9–13.4)
RBC # BLD: 2.83 M/UL — LOW (ref 3.8–5.2)
RBC # FLD: 14.4 % — SIGNIFICANT CHANGE UP (ref 10.3–14.5)
SAO2 % BLDV: 53.6 % — LOW (ref 67–88)
SAO2 % BLDV: 63 % — LOW (ref 67–88)
SAO2 % BLDV: 73.2 % — SIGNIFICANT CHANGE UP (ref 67–88)
SAO2 % BLDV: 77.5 % — SIGNIFICANT CHANGE UP (ref 67–88)
SAO2 % BLDV: 79.6 % — SIGNIFICANT CHANGE UP (ref 67–88)
SAO2 % BLDV: 91 % — HIGH (ref 67–88)
SODIUM SERPL-SCNC: 143 MMOL/L — SIGNIFICANT CHANGE UP (ref 135–145)
SPECIMEN SOURCE: SIGNIFICANT CHANGE UP
TROPONIN T, HIGH SENSITIVITY RESULT: 3696 NG/L — HIGH (ref 0–51)
WBC # BLD: 17.91 K/UL — HIGH (ref 3.8–10.5)
WBC # FLD AUTO: 17.91 K/UL — HIGH (ref 3.8–10.5)

## 2024-10-04 PROCEDURE — 33968 REMOVE AORTIC ASSIST DEVICE: CPT

## 2024-10-04 PROCEDURE — 93010 ELECTROCARDIOGRAM REPORT: CPT

## 2024-10-04 PROCEDURE — 36620 INSERTION CATHETER ARTERY: CPT | Mod: RT

## 2024-10-04 PROCEDURE — 36620 INSERTION CATHETER ARTERY: CPT | Mod: LT

## 2024-10-04 PROCEDURE — 99291 CRITICAL CARE FIRST HOUR: CPT | Mod: 25

## 2024-10-04 PROCEDURE — 71045 X-RAY EXAM CHEST 1 VIEW: CPT | Mod: 26

## 2024-10-04 RX ORDER — SODIUM CHLORIDE 0.9 % (FLUSH) 0.9 %
250 SYRINGE (ML) INJECTION ONCE
Refills: 0 | Status: COMPLETED | OUTPATIENT
Start: 2024-10-04 | End: 2024-10-04

## 2024-10-04 RX ORDER — FUROSEMIDE 10 MG/ML
20 INJECTION INTRAVENOUS ONCE
Refills: 0 | Status: COMPLETED | OUTPATIENT
Start: 2024-10-04 | End: 2024-10-04

## 2024-10-04 RX ORDER — AMIODARONE HYDROCHLORIDE 50 MG/ML
1 INJECTION, SOLUTION INTRAVENOUS
Qty: 450 | Refills: 0 | Status: DISCONTINUED | OUTPATIENT
Start: 2024-10-04 | End: 2024-10-04

## 2024-10-04 RX ORDER — ATORVASTATIN CALCIUM 10 MG/1
80 TABLET, FILM COATED ORAL AT BEDTIME
Refills: 0 | Status: DISCONTINUED | OUTPATIENT
Start: 2024-10-04 | End: 2024-10-06

## 2024-10-04 RX ORDER — AMIODARONE HYDROCHLORIDE 50 MG/ML
150 INJECTION, SOLUTION INTRAVENOUS ONCE
Refills: 0 | Status: COMPLETED | OUTPATIENT
Start: 2024-10-04 | End: 2024-10-04

## 2024-10-04 RX ORDER — DOBUTAMINE HCL 250MG/20ML
1.25 VIAL (ML) INTRAVENOUS
Qty: 500 | Refills: 0 | Status: DISCONTINUED | OUTPATIENT
Start: 2024-10-04 | End: 2024-10-05

## 2024-10-04 RX ORDER — MILRINONE LACTATE 1 MG/ML
0.25 VIAL (ML) INTRAVENOUS
Qty: 20 | Refills: 0 | Status: DISCONTINUED | OUTPATIENT
Start: 2024-10-04 | End: 2024-10-04

## 2024-10-04 RX ORDER — FENTANYL CITRATE-0.9 % NACL/PF 300MCG/30
25 PATIENT CONTROLLED ANALGESIA VIAL INJECTION ONCE
Refills: 0 | Status: DISCONTINUED | OUTPATIENT
Start: 2024-10-04 | End: 2024-10-04

## 2024-10-04 RX ORDER — FUROSEMIDE 10 MG/ML
10 INJECTION INTRAVENOUS ONCE
Refills: 0 | Status: COMPLETED | OUTPATIENT
Start: 2024-10-04 | End: 2024-10-04

## 2024-10-04 RX ORDER — AMIODARONE HYDROCHLORIDE 50 MG/ML
1 INJECTION, SOLUTION INTRAVENOUS
Qty: 450 | Refills: 0 | Status: DISCONTINUED | OUTPATIENT
Start: 2024-10-04 | End: 2024-10-06

## 2024-10-04 RX ORDER — ONDANSETRON HCL/PF 4 MG/2 ML
4 VIAL (ML) INJECTION ONCE
Refills: 0 | Status: COMPLETED | OUTPATIENT
Start: 2024-10-04 | End: 2024-10-05

## 2024-10-04 RX ORDER — ASPIRIN 325 MG
300 TABLET ORAL ONCE
Refills: 0 | Status: COMPLETED | OUTPATIENT
Start: 2024-10-04 | End: 2024-10-04

## 2024-10-04 RX ADMIN — FUROSEMIDE 20 MILLIGRAM(S): 10 INJECTION INTRAVENOUS at 17:00

## 2024-10-04 RX ADMIN — Medication 15 UNIT(S)/MIN: at 21:07

## 2024-10-04 RX ADMIN — Medication 25 MICROGRAM(S): at 13:15

## 2024-10-04 RX ADMIN — Medication 10 MILLIGRAM(S): at 14:54

## 2024-10-04 RX ADMIN — Medication 10 MILLIGRAM(S): at 06:30

## 2024-10-04 RX ADMIN — Medication 4.77 MICROGRAM(S)/KG/MIN: at 21:07

## 2024-10-04 RX ADMIN — FUROSEMIDE 20 MILLIGRAM(S): 10 INJECTION INTRAVENOUS at 23:57

## 2024-10-04 RX ADMIN — FUROSEMIDE 10 MILLIGRAM(S): 10 INJECTION INTRAVENOUS at 06:40

## 2024-10-04 RX ADMIN — Medication 650 MILLIGRAM(S): at 13:37

## 2024-10-04 RX ADMIN — Medication 81 MILLIGRAM(S): at 13:38

## 2024-10-04 RX ADMIN — Medication 3 UNIT(S)/HR: at 21:08

## 2024-10-04 RX ADMIN — Medication 500 MILLIGRAM(S): at 17:22

## 2024-10-04 RX ADMIN — Medication 250 MILLILITER(S): at 10:00

## 2024-10-04 RX ADMIN — GABAPENTIN 100 MILLIGRAM(S): 800 TABLET, FILM COATED ORAL at 14:55

## 2024-10-04 RX ADMIN — AMIODARONE HYDROCHLORIDE 33.3 MG/MIN: 50 INJECTION, SOLUTION INTRAVENOUS at 21:07

## 2024-10-04 RX ADMIN — Medication 100 MILLIGRAM(S): at 16:30

## 2024-10-04 RX ADMIN — AMIODARONE HYDROCHLORIDE 600 MILLIGRAM(S): 50 INJECTION, SOLUTION INTRAVENOUS at 16:30

## 2024-10-04 RX ADMIN — FUROSEMIDE 20 MILLIGRAM(S): 10 INJECTION INTRAVENOUS at 08:26

## 2024-10-04 RX ADMIN — Medication 300 MILLIGRAM(S): at 06:02

## 2024-10-04 RX ADMIN — Medication 500 MILLIGRAM(S): at 08:40

## 2024-10-04 RX ADMIN — Medication 2 TABLET(S): at 21:08

## 2024-10-04 RX ADMIN — AMIODARONE HYDROCHLORIDE 600 MILLIGRAM(S): 50 INJECTION, SOLUTION INTRAVENOUS at 16:15

## 2024-10-04 RX ADMIN — Medication 10 MILLIGRAM(S): at 21:08

## 2024-10-04 RX ADMIN — ATORVASTATIN CALCIUM 80 MILLIGRAM(S): 10 TABLET, FILM COATED ORAL at 21:08

## 2024-10-04 RX ADMIN — GABAPENTIN 100 MILLIGRAM(S): 800 TABLET, FILM COATED ORAL at 21:08

## 2024-10-04 RX ADMIN — Medication 100 MILLIGRAM(S): at 23:56

## 2024-10-04 RX ADMIN — AMIODARONE HYDROCHLORIDE 400 MILLIGRAM(S): 50 INJECTION, SOLUTION INTRAVENOUS at 08:40

## 2024-10-04 RX ADMIN — Medication 17 GRAM(S): at 13:37

## 2024-10-04 RX ADMIN — GABAPENTIN 100 MILLIGRAM(S): 800 TABLET, FILM COATED ORAL at 08:40

## 2024-10-04 RX ADMIN — Medication 650 MILLIGRAM(S): at 17:19

## 2024-10-04 RX ADMIN — Medication 100 MILLIGRAM(S): at 07:55

## 2024-10-04 RX ADMIN — Medication 25 MICROGRAM(S): at 13:45

## 2024-10-04 RX ADMIN — Medication 75 MICROGRAM(S): at 21:08

## 2024-10-04 NOTE — PROGRESS NOTE ADULT - SUBJECTIVE AND OBJECTIVE BOX
Patient seen and examined at the bedside.    Remains critically ill on continuous ICU monitoring, at risk for life threatening decompensation.  All Labs, data reviewed. Plan of care discussed in length during multi-disciplinary ICU rounds.       Brief Summary:  73 y/o F with CAD s/p CABG with SAMUEL on 10/03    24 Hour events:    Objective:  Vital Signs Last 24 Hrs  T(C): 37.3 (04 Oct 2024 08:00), Max: 37.5 (04 Oct 2024 04:00)  T(F): 99.1 (04 Oct 2024 08:00), Max: 99.5 (04 Oct 2024 04:00)  HR: 101 (04 Oct 2024 08:00) (76 - 116)  BP: 120/82 (03 Oct 2024 10:15) (120/82 - 120/82)  BP(mean): 97 (03 Oct 2024 10:15) (97 - 97)  RR: 35 (04 Oct 2024 08:00) (14 - 38)  SpO2: 100% (04 Oct 2024 08:00) (88% - 100%)    Parameters below as of 04 Oct 2024 08:00  Patient On (Oxygen Delivery Method): nasal cannula  O2 Flow (L/min): 6      Mode: vent off              Physical Exam:   General: Extubated, Alert and interactive   Neurology: Sedated  Respiratory: Bilateral breast sounds   CV: Sinus    Abdominal: Soft, Nontender  Extremities: Warm, well-perfused  Beck  -------------------------------------------------------------------------------------------------------------------------------    Labs:                        8.3    17.91 )-----------( 101      ( 04 Oct 2024 00:24 )             24.1     10-04    143  |  108  |  15  ----------------------------<  126[H]  4.5   |  22  |  0.79    Ca    9.0      04 Oct 2024 00:25  Phos  2.6     10-04  Mg     2.8     10-04    TPro  5.2[L]  /  Alb  3.6  /  TBili  1.7[H]  /  DBili  x   /  AST  72[H]  /  ALT  23  /  AlkPhos  59  10-04    LIVER FUNCTIONS - ( 04 Oct 2024 00:25 )  Alb: 3.6 g/dL / Pro: 5.2 g/dL / ALK PHOS: 59 U/L / ALT: 23 U/L / AST: 72 U/L / GGT: x           PT/INR - ( 04 Oct 2024 00:24 )   PT: 15.1 sec;   INR: 1.32 ratio         PTT - ( 04 Oct 2024 00:24 )  PTT:29.9 sec  ABG - ( 04 Oct 2024 03:45 )  pH, Arterial: 7.32  pH, Blood: x     /  pCO2: 45    /  pO2: 106   / HCO3: 23    / Base Excess: -2.9  /  SaO2: 100.0       ALL MEDICATIONS   MEDICATIONS  (STANDING):  acetaminophen     Tablet .. 650 milliGRAM(s) Oral every 6 hours  aMIOdarone    Tablet 400 milliGRAM(s) Oral two times a day  ascorbic acid 500 milliGRAM(s) Oral two times a day  aspirin enteric coated 81 milliGRAM(s) Oral daily  cefuroxime  IVPB 1500 milliGRAM(s) IV Intermittent every 8 hours  chlorhexidine 2% Cloths 1 Application(s) Topical daily  dexMEDEtomidine Infusion 0.5 MICROgram(s)/kG/Hr (9.94 mL/Hr) IV Continuous <Continuous>  dextrose 50% Injectable 50 milliLiter(s) IV Push every 15 minutes  DOBUTamine Infusion 2 MICROgram(s)/kG/Min (4.77 mL/Hr) IV Continuous <Continuous>  gabapentin 100 milliGRAM(s) Oral every 8 hours  insulin regular Infusion 3 Unit(s)/Hr (3 mL/Hr) IV Continuous <Continuous>  levothyroxine Injectable 37.5 MICROGram(s) IV Push at bedtime  metoclopramide Injectable 10 milliGRAM(s) IV Push every 8 hours  norepinephrine Infusion 0.05 MICROgram(s)/kG/Min (7.45 mL/Hr) IV Continuous <Continuous>  pantoprazole  Injectable 40 milliGRAM(s) IV Push daily  polyethylene glycol 3350 17 Gram(s) Oral daily  senna 2 Tablet(s) Oral at bedtime  sodium chloride 0.9%. 1000 milliLiter(s) (10 mL/Hr) IV Continuous <Continuous>  vasopressin Infusion 0.1 Unit(s)/Min (15 mL/Hr) IV Continuous <Continuous>    MEDICATIONS  (PRN):  HYDROmorphone  Injectable 0.5 milliGRAM(s) IV Push every 6 hours PRN Breakthrough Pain  oxyCODONE    IR 5 milliGRAM(s) Oral every 4 hours PRN Moderate Pain (4 - 6)  oxyCODONE    IR 10 milliGRAM(s) Oral every 4 hours PRN Severe Pain (7 - 10)    ------------------------------------------------------------------------------------------------------------------------------  Assessment:  73 y/o F with CAD s/p CABG with SAMUEL on 10/03    Hypovolemia  Post op respiratory insufficiency  Acute blood loss anemia  Thrombocytopenia  Leukocytosis  At risk for delirium   At risk for hemodynamic decompensation  At high risk for cardiac arrythmias   At high risk for respiratory complications        Plan:   ***Neuro***  Maintain day/night cycle to prevent ICU delirium   Sedated with Precedex for vent synchrony   Postoperative acute pain control with Tylenol, Gabapentin, and prns.    ***Cardiovascular***  At high risk for hemodynamic instability and cardiac arrhythmias.  Monitor for sign of hypoperfusion, trend lactate  R IABP with 1:1 augmentation   Maintain MAPs > 65 mm Hg. Titrate Vasopressin and Levophed  Titrate Dobutamine. Keep CVP<10.  Amiodarone for a fib prophylaxis   ASA daily  Monitor chest tube output.    ***Pulmonary***  Postoperative acute respiratory insufficiency  Deep breathing and coughing exercises, IS, Mobilization, nebs, Chest PT    ***GI***  Keep NPO for now.  Protonix for stress ulcer prophylaxis   Bowel regimen.   Trend LFTs  Reglan for gut motility    ***Renal***  Trend Creatinine/BUN  Beck catheter for strict I/O measurements.    Replete electrolytes as indicated.    ***ID***  Leukocytosis   Cefuroxime for perioperative antibiotic coverage   Secondary prophylaxis     ***Endocrine***  Insulin per protocol for DM  Levothyroxine for Hypothyroidism         ***Hematology***  Acute blood loss anemia and thrombocytopenia   Transfused 4prbc, 1 cryo, 1 plt, 1 FFP in OR 10/3  Heparin/Argatroban/Lovenox for anticoagulation/ DVT prophylaxis         IKeo MD, personally performed the services described in this documentation. all medical record entries made by the scribe were at my direction and in my presence. I have reviewed the chart and agree that the record reflects my personal performance and is accurate and complete  Electronically signed:   Keo Curran MD  CT ICU attending     ICU time: ** mins     By signing my name below, I, Naima Ramon, attest that this documentation has been prepared under the direction and in the presence of Keo Curran MD  Electronically signed: Naima Ramon, 10-04-24 @ 08:43            sa Patient seen and examined at the bedside.    Remains critically ill on continuous ICU monitoring, at risk for life threatening decompensation.  All Labs, data reviewed. Plan of care discussed in length during multi-disciplinary ICU rounds.       Brief Summary:  73 y/o F with CAD s/p CABG with SAMUEL on   10/03 : C3L, LAAC, pre op IABP  10/04: extubated, d/tameka IABP    24 Hour events:  extubated in AM to 5 l o NC  wean IABP and d/c    1 in AM and increased to 2 for Svo2 of 53. f/u repeat ABG and Svo2. Vaso for MAP >65  Afib - Amio bolus x2 and started on drip    Objective:  Vital Signs Last 24 Hrs  T(C): 37.3 (04 Oct 2024 08:00), Max: 37.5 (04 Oct 2024 04:00)  T(F): 99.1 (04 Oct 2024 08:00), Max: 99.5 (04 Oct 2024 04:00)  HR: 101 (04 Oct 2024 08:00) (76 - 116)  BP: 120/82 (03 Oct 2024 10:15) (120/82 - 120/82)  BP(mean): 97 (03 Oct 2024 10:15) (97 - 97)  RR: 35 (04 Oct 2024 08:00) (14 - 38)  SpO2: 100% (04 Oct 2024 08:00) (88% - 100%)    Parameters below as of 04 Oct 2024 08:00  Patient On (Oxygen Delivery Method): nasal cannula  O2 Flow (L/min): 6      Mode: vent off              Physical Exam:   General: Extubated, Alert and interactive   Neurology: Sedated  Respiratory: Bilateral breast sounds   CV: Sinus    Abdominal: Soft, Nontender  Extremities: Warm, well-perfused  Ebony  -------------------------------------------------------------------------------------------------------------------------------    Labs:                        8.3    17.91 )-----------( 101      ( 04 Oct 2024 00:24 )             24.1     10    143  |  108  |  15  ----------------------------<  126[H]  4.5   |  22  |  0.79    Ca    9.0      04 Oct 2024 00:25  Phos  2.6     10  Mg     2.8     10-04    TPro  5.2[L]  /  Alb  3.6  /  TBili  1.7[H]  /  DBili  x   /  AST  72[H]  /  ALT  23  /  AlkPhos  59  10-04    LIVER FUNCTIONS - ( 04 Oct 2024 00:25 )  Alb: 3.6 g/dL / Pro: 5.2 g/dL / ALK PHOS: 59 U/L / ALT: 23 U/L / AST: 72 U/L / GGT: x           PT/INR - ( 04 Oct 2024 00:24 )   PT: 15.1 sec;   INR: 1.32 ratio         PTT - ( 04 Oct 2024 00:24 )  PTT:29.9 sec  ABG - ( 04 Oct 2024 03:45 )  pH, Arterial: 7.32  pH, Blood: x     /  pCO2: 45    /  pO2: 106   / HCO3: 23    / Base Excess: -2.9  /  SaO2: 100.0       ALL MEDICATIONS   MEDICATIONS  (STANDING):  acetaminophen     Tablet .. 650 milliGRAM(s) Oral every 6 hours  aMIOdarone    Tablet 400 milliGRAM(s) Oral two times a day  ascorbic acid 500 milliGRAM(s) Oral two times a day  aspirin enteric coated 81 milliGRAM(s) Oral daily  cefuroxime  IVPB 1500 milliGRAM(s) IV Intermittent every 8 hours  chlorhexidine 2% Cloths 1 Application(s) Topical daily  dexMEDEtomidine Infusion 0.5 MICROgram(s)/kG/Hr (9.94 mL/Hr) IV Continuous <Continuous>  dextrose 50% Injectable 50 milliLiter(s) IV Push every 15 minutes  DOBUTamine Infusion 2 MICROgram(s)/kG/Min (4.77 mL/Hr) IV Continuous <Continuous>  gabapentin 100 milliGRAM(s) Oral every 8 hours  insulin regular Infusion 3 Unit(s)/Hr (3 mL/Hr) IV Continuous <Continuous>  levothyroxine Injectable 37.5 MICROGram(s) IV Push at bedtime  metoclopramide Injectable 10 milliGRAM(s) IV Push every 8 hours  norepinephrine Infusion 0.05 MICROgram(s)/kG/Min (7.45 mL/Hr) IV Continuous <Continuous>  pantoprazole  Injectable 40 milliGRAM(s) IV Push daily  polyethylene glycol 3350 17 Gram(s) Oral daily  senna 2 Tablet(s) Oral at bedtime  sodium chloride 0.9%. 1000 milliLiter(s) (10 mL/Hr) IV Continuous <Continuous>  vasopressin Infusion 0.1 Unit(s)/Min (15 mL/Hr) IV Continuous <Continuous>    MEDICATIONS  (PRN):  HYDROmorphone  Injectable 0.5 milliGRAM(s) IV Push every 6 hours PRN Breakthrough Pain  oxyCODONE    IR 5 milliGRAM(s) Oral every 4 hours PRN Moderate Pain (4 - 6)  oxyCODONE    IR 10 milliGRAM(s) Oral every 4 hours PRN Severe Pain (7 - 10)    ------------------------------------------------------------------------------------------------------------------------------  Assessment:  73 y/o F with CAD s/p CABG with SAMUEL on 10/03    Hypovolemia  Post op respiratory insufficiency  Acute blood loss anemia  Thrombocytopenia  Leukocytosis  At risk for delirium   At risk for hemodynamic decompensation  At high risk for cardiac arrhythmias   At high risk for respiratory complications        Plan:   ***Neuro***  Maintain day/night cycle to prevent ICU delirium   Sedated with Precedex for vent synchrony   Postoperative acute pain control with Tylenol, Gabapentin, and prns.    ***Cardiovascular***  At high risk for hemodynamic instability and cardiac arrhythmias.  Monitor for sign of hypoperfusion, trend lactate  R IABP with 1:1 augmentation - d/c today  Maintain MAPs > 65 mm Hg. Titrate Vasopressin   remains on Dobutamine and Vaso. Keep CVP<10.  Afib - Amio bolus x2 and started on drip  ASA, statin daily  Monitor chest,  tube output.    ***Pulmonary***  Postoperative acute respiratory insufficiency  Deep breathing and coughing exercises, IS, Mobilization, nebs, Chest PT    ***GI***  cardiac diet  Protonix for stress ulcer prophylaxis   Bowel regimen.   Trend LFTs  Reglan for gut motility    ***Renal***  Trend Creatinine/BUN  Beck catheter for strict I/O measurements.    Replete electrolytes as indicated.    ***ID***  Leukocytosis   Cefuroxime for perioperative antibiotic coverage   Secondary prophylaxis     ***Endocrine***  Insulin per protocol for DM  Levothyroxine for Hypothyroidism         ***Hematology***  Acute blood loss anemia and thrombocytopenia   Transfused 4prbc, 1 cryo, 1 plt, 1 FFP in OR 10/3  Heparin/Argatroban/Lovenox for anticoagulation/ DVT prophylaxis         IKeo MD, personally performed the services described in this documentation. all medical record entries made by the scribe were at my direction and in my presence. I have reviewed the chart and agree that the record reflects my personal performance and is accurate and complete  Electronically signed:   Keo Curran MD  CT ICU attending     ICU time: 55 mins     By signing my name below, I, Naima Ramon, attest that this documentation has been prepared under the direction and in the presence of Keo Curran MD  Electronically signed: Naima Ramon, 10-04-24 @ 08:43

## 2024-10-04 NOTE — AIRWAY REMOVAL NOTE  ADULT & PEDS - ARTIFICAL AIRWAY REMOVAL COMMENTS
Written order for extubation verified. The patient was identified by full name and birth date compared to the identification band. Present during the procedure was Christina Limon)

## 2024-10-04 NOTE — PROCEDURE NOTE - ADDITIONAL PROCEDURE DETAILS
PROCEDURE NOTE  REMOVAL OF INTRA AORTIC BALLOON PUMP    The IABP (intra-aortic balloon pump) was weaned according to protocol.  Hemodynamics remained stable.  Pulses in the right  lower extremity are palpable/audible by doppler/absent.  The patient was placed in the supine position.  The insertion site was identified and the sutures were removed.  The IABP was turned off and the balloon deflated.  The IABP was then removed.  Direct pressure was applied for 45  minutes.  A sandbag was applied and is  to remain in place for three hours.    Monitoring of the right  groin and both lower extremities including neuro-vascular checks and vital signs every 15 minutes  x4, then every 30 minutes x 2, then every 1 hr x 4 was ordered.      Complications:none

## 2024-10-05 LAB
ALBUMIN SERPL ELPH-MCNC: 3.5 G/DL — SIGNIFICANT CHANGE UP (ref 3.3–5)
ALBUMIN SERPL ELPH-MCNC: 3.7 G/DL — SIGNIFICANT CHANGE UP (ref 3.3–5)
ALP SERPL-CCNC: 70 U/L — SIGNIFICANT CHANGE UP (ref 40–120)
ALP SERPL-CCNC: 98 U/L — SIGNIFICANT CHANGE UP (ref 40–120)
ALT FLD-CCNC: 195 U/L — HIGH (ref 10–45)
ALT FLD-CCNC: 45 U/L — SIGNIFICANT CHANGE UP (ref 10–45)
AMYLASE P1 CFR SERPL: 25 U/L — SIGNIFICANT CHANGE UP (ref 25–125)
ANION GAP SERPL CALC-SCNC: 15 MMOL/L — SIGNIFICANT CHANGE UP (ref 5–17)
ANION GAP SERPL CALC-SCNC: 17 MMOL/L — SIGNIFICANT CHANGE UP (ref 5–17)
AST SERPL-CCNC: 353 U/L — HIGH (ref 10–40)
AST SERPL-CCNC: 93 U/L — HIGH (ref 10–40)
BASE EXCESS BLDV CALC-SCNC: -1.1 MMOL/L — SIGNIFICANT CHANGE UP (ref -2–3)
BASE EXCESS BLDV CALC-SCNC: -5.7 MMOL/L — LOW (ref -2–3)
BASE EXCESS BLDV CALC-SCNC: 0.1 MMOL/L — SIGNIFICANT CHANGE UP (ref -2–3)
BASE EXCESS BLDV CALC-SCNC: 1.5 MMOL/L — SIGNIFICANT CHANGE UP (ref -2–3)
BASOPHILS # BLD AUTO: 0 K/UL — SIGNIFICANT CHANGE UP (ref 0–0.2)
BASOPHILS NFR BLD AUTO: 0 % — SIGNIFICANT CHANGE UP (ref 0–2)
BILIRUB SERPL-MCNC: 0.9 MG/DL — SIGNIFICANT CHANGE UP (ref 0.2–1.2)
BILIRUB SERPL-MCNC: 1.6 MG/DL — HIGH (ref 0.2–1.2)
BLD GP AB SCN SERPL QL: NEGATIVE — SIGNIFICANT CHANGE UP
BUN SERPL-MCNC: 22 MG/DL — SIGNIFICANT CHANGE UP (ref 7–23)
BUN SERPL-MCNC: 27 MG/DL — HIGH (ref 7–23)
CALCIUM SERPL-MCNC: 8.8 MG/DL — SIGNIFICANT CHANGE UP (ref 8.4–10.5)
CALCIUM SERPL-MCNC: 9.2 MG/DL — SIGNIFICANT CHANGE UP (ref 8.4–10.5)
CHLORIDE SERPL-SCNC: 100 MMOL/L — SIGNIFICANT CHANGE UP (ref 96–108)
CHLORIDE SERPL-SCNC: 104 MMOL/L — SIGNIFICANT CHANGE UP (ref 96–108)
CO2 BLDV-SCNC: 22 MMOL/L — SIGNIFICANT CHANGE UP (ref 22–26)
CO2 BLDV-SCNC: 26 MMOL/L — SIGNIFICANT CHANGE UP (ref 22–26)
CO2 BLDV-SCNC: 28 MMOL/L — HIGH (ref 22–26)
CO2 BLDV-SCNC: 29 MMOL/L — HIGH (ref 22–26)
CO2 SERPL-SCNC: 18 MMOL/L — LOW (ref 22–31)
CO2 SERPL-SCNC: 22 MMOL/L — SIGNIFICANT CHANGE UP (ref 22–31)
CREAT SERPL-MCNC: 0.94 MG/DL — SIGNIFICANT CHANGE UP (ref 0.5–1.3)
CREAT SERPL-MCNC: 1.25 MG/DL — SIGNIFICANT CHANGE UP (ref 0.5–1.3)
EGFR: 45 ML/MIN/1.73M2 — LOW
EGFR: 64 ML/MIN/1.73M2 — SIGNIFICANT CHANGE UP
EOSINOPHIL # BLD AUTO: 0 K/UL — SIGNIFICANT CHANGE UP (ref 0–0.5)
EOSINOPHIL NFR BLD AUTO: 0 % — SIGNIFICANT CHANGE UP (ref 0–6)
GAS PNL BLDA: SIGNIFICANT CHANGE UP
GAS PNL BLDV: SIGNIFICANT CHANGE UP
GAS PNL BLDV: SIGNIFICANT CHANGE UP
GLUCOSE BLDC GLUCOMTR-MCNC: 108 MG/DL — HIGH (ref 70–99)
GLUCOSE BLDC GLUCOMTR-MCNC: 118 MG/DL — HIGH (ref 70–99)
GLUCOSE BLDC GLUCOMTR-MCNC: 119 MG/DL — HIGH (ref 70–99)
GLUCOSE BLDC GLUCOMTR-MCNC: 119 MG/DL — HIGH (ref 70–99)
GLUCOSE BLDC GLUCOMTR-MCNC: 122 MG/DL — HIGH (ref 70–99)
GLUCOSE BLDC GLUCOMTR-MCNC: 128 MG/DL — HIGH (ref 70–99)
GLUCOSE BLDC GLUCOMTR-MCNC: 131 MG/DL — HIGH (ref 70–99)
GLUCOSE BLDC GLUCOMTR-MCNC: 137 MG/DL — HIGH (ref 70–99)
GLUCOSE BLDC GLUCOMTR-MCNC: 138 MG/DL — HIGH (ref 70–99)
GLUCOSE BLDC GLUCOMTR-MCNC: 203 MG/DL — HIGH (ref 70–99)
GLUCOSE BLDC GLUCOMTR-MCNC: 211 MG/DL — HIGH (ref 70–99)
GLUCOSE BLDC GLUCOMTR-MCNC: 215 MG/DL — HIGH (ref 70–99)
GLUCOSE BLDC GLUCOMTR-MCNC: 221 MG/DL — HIGH (ref 70–99)
GLUCOSE BLDC GLUCOMTR-MCNC: 246 MG/DL — HIGH (ref 70–99)
GLUCOSE BLDC GLUCOMTR-MCNC: 98 MG/DL — SIGNIFICANT CHANGE UP (ref 70–99)
GLUCOSE BLDC GLUCOMTR-MCNC: >600 MG/DL — CRITICAL HIGH (ref 70–99)
GLUCOSE SERPL-MCNC: 137 MG/DL — HIGH (ref 70–99)
GLUCOSE SERPL-MCNC: 186 MG/DL — HIGH (ref 70–99)
HCO3 BLDV-SCNC: 21 MMOL/L — LOW (ref 22–29)
HCO3 BLDV-SCNC: 25 MMOL/L — SIGNIFICANT CHANGE UP (ref 22–29)
HCO3 BLDV-SCNC: 26 MMOL/L — SIGNIFICANT CHANGE UP (ref 22–29)
HCO3 BLDV-SCNC: 28 MMOL/L — SIGNIFICANT CHANGE UP (ref 22–29)
HCT VFR BLD CALC: 27.2 % — LOW (ref 34.5–45)
HGB BLD-MCNC: 9.1 G/DL — LOW (ref 11.5–15.5)
HOROWITZ INDEX BLDV+IHG-RTO: 32 — SIGNIFICANT CHANGE UP
HOROWITZ INDEX BLDV+IHG-RTO: 32 — SIGNIFICANT CHANGE UP
HOROWITZ INDEX BLDV+IHG-RTO: 44 — SIGNIFICANT CHANGE UP
LIDOCAIN IGE QN: 4 U/L — LOW (ref 7–60)
LYMPHOCYTES # BLD AUTO: 1.49 K/UL — SIGNIFICANT CHANGE UP (ref 1–3.3)
LYMPHOCYTES # BLD AUTO: 7.8 % — LOW (ref 13–44)
MAGNESIUM SERPL-MCNC: 2.9 MG/DL — HIGH (ref 1.6–2.6)
MANUAL SMEAR VERIFICATION: SIGNIFICANT CHANGE UP
MCHC RBC-ENTMCNC: 28.4 PG — SIGNIFICANT CHANGE UP (ref 27–34)
MCHC RBC-ENTMCNC: 33.5 GM/DL — SIGNIFICANT CHANGE UP (ref 32–36)
MCV RBC AUTO: 85 FL — SIGNIFICANT CHANGE UP (ref 80–100)
MONOCYTES # BLD AUTO: 1 K/UL — HIGH (ref 0–0.9)
MONOCYTES NFR BLD AUTO: 5.2 % — SIGNIFICANT CHANGE UP (ref 2–14)
NEUTROPHILS # BLD AUTO: 16.66 K/UL — HIGH (ref 1.8–7.4)
NEUTROPHILS NFR BLD AUTO: 87 % — HIGH (ref 43–77)
NRBC # BLD: 1 /100 WBCS — HIGH (ref 0–0)
PCO2 BLDV: 43 MMHG — HIGH (ref 39–42)
PCO2 BLDV: 45 MMHG — HIGH (ref 39–42)
PCO2 BLDV: 49 MMHG — HIGH (ref 39–42)
PCO2 BLDV: 50 MMHG — HIGH (ref 39–42)
PH BLDV: 7.29 — LOW (ref 7.32–7.43)
PH BLDV: 7.33 — SIGNIFICANT CHANGE UP (ref 7.32–7.43)
PH BLDV: 7.35 — SIGNIFICANT CHANGE UP (ref 7.32–7.43)
PH BLDV: 7.36 — SIGNIFICANT CHANGE UP (ref 7.32–7.43)
PHOSPHATE SERPL-MCNC: 3.8 MG/DL — SIGNIFICANT CHANGE UP (ref 2.5–4.5)
PLAT MORPH BLD: NORMAL — SIGNIFICANT CHANGE UP
PLATELET # BLD AUTO: 127 K/UL — LOW (ref 150–400)
PO2 BLDV: 35 MMHG — SIGNIFICANT CHANGE UP (ref 25–45)
PO2 BLDV: 36 MMHG — SIGNIFICANT CHANGE UP (ref 25–45)
PO2 BLDV: 36 MMHG — SIGNIFICANT CHANGE UP (ref 25–45)
PO2 BLDV: 41 MMHG — SIGNIFICANT CHANGE UP (ref 25–45)
POTASSIUM SERPL-MCNC: 3.7 MMOL/L — SIGNIFICANT CHANGE UP (ref 3.5–5.3)
POTASSIUM SERPL-MCNC: 5.9 MMOL/L — HIGH (ref 3.5–5.3)
POTASSIUM SERPL-SCNC: 3.7 MMOL/L — SIGNIFICANT CHANGE UP (ref 3.5–5.3)
POTASSIUM SERPL-SCNC: 5.9 MMOL/L — HIGH (ref 3.5–5.3)
PROT SERPL-MCNC: 5.8 G/DL — LOW (ref 6–8.3)
PROT SERPL-MCNC: 6.4 G/DL — SIGNIFICANT CHANGE UP (ref 6–8.3)
RBC # BLD: 3.2 M/UL — LOW (ref 3.8–5.2)
RBC # FLD: 15.1 % — HIGH (ref 10.3–14.5)
RBC BLD AUTO: NORMAL — SIGNIFICANT CHANGE UP
RH IG SCN BLD-IMP: POSITIVE — SIGNIFICANT CHANGE UP
SAO2 % BLDV: 59.8 % — LOW (ref 67–88)
SAO2 % BLDV: 60.2 % — LOW (ref 67–88)
SAO2 % BLDV: 62.9 % — LOW (ref 67–88)
SAO2 % BLDV: 66.7 % — LOW (ref 67–88)
SODIUM SERPL-SCNC: 135 MMOL/L — SIGNIFICANT CHANGE UP (ref 135–145)
SODIUM SERPL-SCNC: 141 MMOL/L — SIGNIFICANT CHANGE UP (ref 135–145)
WBC # BLD: 19.15 K/UL — HIGH (ref 3.8–10.5)
WBC # FLD AUTO: 19.15 K/UL — HIGH (ref 3.8–10.5)

## 2024-10-05 PROCEDURE — 99291 CRITICAL CARE FIRST HOUR: CPT | Mod: 24

## 2024-10-05 PROCEDURE — 71045 X-RAY EXAM CHEST 1 VIEW: CPT | Mod: 26,76

## 2024-10-05 RX ORDER — FUROSEMIDE 10 MG/ML
20 INJECTION INTRAVENOUS EVERY 12 HOURS
Refills: 0 | Status: DISCONTINUED | OUTPATIENT
Start: 2024-10-05 | End: 2024-10-06

## 2024-10-05 RX ORDER — BUMETANIDE 2 MG/1
1 TABLET ORAL
Qty: 20 | Refills: 0 | Status: DISCONTINUED | OUTPATIENT
Start: 2024-10-05 | End: 2024-10-07

## 2024-10-05 RX ORDER — ONDANSETRON HCL/PF 4 MG/2 ML
4 VIAL (ML) INJECTION ONCE
Refills: 0 | Status: COMPLETED | OUTPATIENT
Start: 2024-10-05 | End: 2024-10-05

## 2024-10-05 RX ORDER — AMIODARONE HYDROCHLORIDE 50 MG/ML
400 INJECTION, SOLUTION INTRAVENOUS EVERY 8 HOURS
Refills: 0 | Status: DISCONTINUED | OUTPATIENT
Start: 2024-10-05 | End: 2024-10-06

## 2024-10-05 RX ORDER — ALCOHOL ANTISEPTIC PADS
25 PADS, MEDICATED (EA) TOPICAL ONCE
Refills: 0 | Status: COMPLETED | OUTPATIENT
Start: 2024-10-05 | End: 2024-10-05

## 2024-10-05 RX ORDER — AMIODARONE HYDROCHLORIDE 50 MG/ML
INJECTION, SOLUTION INTRAVENOUS
Refills: 0 | Status: DISCONTINUED | OUTPATIENT
Start: 2024-10-05 | End: 2024-10-05

## 2024-10-05 RX ORDER — ENOXAPARIN SODIUM 150 MG/ML
40 INJECTION SUBCUTANEOUS EVERY 24 HOURS
Refills: 0 | Status: DISCONTINUED | OUTPATIENT
Start: 2024-10-05 | End: 2024-10-07

## 2024-10-05 RX ORDER — FUROSEMIDE 10 MG/ML
20 INJECTION INTRAVENOUS ONCE
Refills: 0 | Status: COMPLETED | OUTPATIENT
Start: 2024-10-05 | End: 2024-10-05

## 2024-10-05 RX ORDER — INSULIN REGULAR, HUMAN 100/ML
5 VIAL (ML) INJECTION ONCE
Refills: 0 | Status: COMPLETED | OUTPATIENT
Start: 2024-10-05 | End: 2024-10-05

## 2024-10-05 RX ORDER — IPRATROPIUM BROMIDE AND ALBUTEROL SULFATE .5; 3 MG/3ML; MG/3ML
3 SOLUTION RESPIRATORY (INHALATION) EVERY 8 HOURS
Refills: 0 | Status: DISCONTINUED | OUTPATIENT
Start: 2024-10-05 | End: 2024-10-14

## 2024-10-05 RX ORDER — AMIODARONE HYDROCHLORIDE 50 MG/ML
400 INJECTION, SOLUTION INTRAVENOUS ONCE
Refills: 0 | Status: COMPLETED | OUTPATIENT
Start: 2024-10-05 | End: 2024-10-05

## 2024-10-05 RX ORDER — AMIODARONE HYDROCHLORIDE 50 MG/ML
INJECTION, SOLUTION INTRAVENOUS
Refills: 0 | Status: DISCONTINUED | OUTPATIENT
Start: 2024-10-05 | End: 2024-10-06

## 2024-10-05 RX ORDER — BUMETANIDE 2 MG/1
2 TABLET ORAL ONCE
Refills: 0 | Status: COMPLETED | OUTPATIENT
Start: 2024-10-05 | End: 2024-10-05

## 2024-10-05 RX ADMIN — Medication 650 MILLIGRAM(S): at 17:02

## 2024-10-05 RX ADMIN — Medication 50 MILLIEQUIVALENT(S): at 12:00

## 2024-10-05 RX ADMIN — ENOXAPARIN SODIUM 40 MILLIGRAM(S): 150 INJECTION SUBCUTANEOUS at 08:44

## 2024-10-05 RX ADMIN — Medication 75 MICROGRAM(S): at 05:06

## 2024-10-05 RX ADMIN — Medication 3 UNIT(S)/HR: at 08:45

## 2024-10-05 RX ADMIN — AMIODARONE HYDROCHLORIDE 400 MILLIGRAM(S): 50 INJECTION, SOLUTION INTRAVENOUS at 16:32

## 2024-10-05 RX ADMIN — Medication 40 MILLIEQUIVALENT(S): at 23:15

## 2024-10-05 RX ADMIN — HYDROMORPHONE HYDROCHLORIDE 0.5 MILLIGRAM(S): 1 INJECTION, SOLUTION INTRAMUSCULAR; INTRAVENOUS; SUBCUTANEOUS at 10:12

## 2024-10-05 RX ADMIN — Medication 650 MILLIGRAM(S): at 11:57

## 2024-10-05 RX ADMIN — FUROSEMIDE 20 MILLIGRAM(S): 10 INJECTION INTRAVENOUS at 17:02

## 2024-10-05 RX ADMIN — BUMETANIDE 2 MILLIGRAM(S): 2 TABLET ORAL at 18:01

## 2024-10-05 RX ADMIN — FUROSEMIDE 20 MILLIGRAM(S): 10 INJECTION INTRAVENOUS at 09:00

## 2024-10-05 RX ADMIN — Medication 17 GRAM(S): at 11:28

## 2024-10-05 RX ADMIN — Medication 50 MILLIEQUIVALENT(S): at 03:48

## 2024-10-05 RX ADMIN — IPRATROPIUM BROMIDE AND ALBUTEROL SULFATE 3 MILLILITER(S): .5; 3 SOLUTION RESPIRATORY (INHALATION) at 17:45

## 2024-10-05 RX ADMIN — Medication 100 MILLIGRAM(S): at 08:44

## 2024-10-05 RX ADMIN — Medication 200 GRAM(S): at 11:27

## 2024-10-05 RX ADMIN — Medication 50 MILLIEQUIVALENT(S): at 12:30

## 2024-10-05 RX ADMIN — Medication 81 MILLIGRAM(S): at 11:27

## 2024-10-05 RX ADMIN — Medication 500 MILLIGRAM(S): at 17:04

## 2024-10-05 RX ADMIN — IPRATROPIUM BROMIDE AND ALBUTEROL SULFATE 3 MILLILITER(S): .5; 3 SOLUTION RESPIRATORY (INHALATION) at 21:45

## 2024-10-05 RX ADMIN — Medication 50 MILLIEQUIVALENT(S): at 13:26

## 2024-10-05 RX ADMIN — Medication 650 MILLIGRAM(S): at 05:06

## 2024-10-05 RX ADMIN — Medication 25 MILLILITER(S): at 17:21

## 2024-10-05 RX ADMIN — Medication 50 MILLIEQUIVALENT(S): at 04:48

## 2024-10-05 RX ADMIN — GABAPENTIN 100 MILLIGRAM(S): 800 TABLET, FILM COATED ORAL at 21:55

## 2024-10-05 RX ADMIN — Medication 10 MILLIGRAM(S): at 13:25

## 2024-10-05 RX ADMIN — Medication 5 UNIT(S): at 17:20

## 2024-10-05 RX ADMIN — CHLORHEXIDINE GLUCONATE ORAL RINSE 1 APPLICATION(S): 1.2 SOLUTION DENTAL at 11:28

## 2024-10-05 RX ADMIN — Medication 650 MILLIGRAM(S): at 17:32

## 2024-10-05 RX ADMIN — Medication 10 MILLIGRAM(S): at 05:19

## 2024-10-05 RX ADMIN — Medication 50 MILLILITER(S): at 11:28

## 2024-10-05 RX ADMIN — Medication 650 MILLIGRAM(S): at 05:33

## 2024-10-05 RX ADMIN — Medication 500 MILLIGRAM(S): at 05:06

## 2024-10-05 RX ADMIN — ATORVASTATIN CALCIUM 80 MILLIGRAM(S): 10 TABLET, FILM COATED ORAL at 21:55

## 2024-10-05 RX ADMIN — Medication 4 MILLIGRAM(S): at 01:54

## 2024-10-05 RX ADMIN — GABAPENTIN 100 MILLIGRAM(S): 800 TABLET, FILM COATED ORAL at 05:05

## 2024-10-05 RX ADMIN — HYDROMORPHONE HYDROCHLORIDE 0.5 MILLIGRAM(S): 1 INJECTION, SOLUTION INTRAMUSCULAR; INTRAVENOUS; SUBCUTANEOUS at 09:57

## 2024-10-05 RX ADMIN — Medication 650 MILLIGRAM(S): at 11:27

## 2024-10-05 RX ADMIN — Medication 50 MILLIEQUIVALENT(S): at 03:05

## 2024-10-05 RX ADMIN — CHLORHEXIDINE GLUCONATE ORAL RINSE 1 APPLICATION(S): 1.2 SOLUTION DENTAL at 00:52

## 2024-10-05 RX ADMIN — Medication 4 MILLIGRAM(S): at 05:05

## 2024-10-05 NOTE — PHYSICAL THERAPY INITIAL EVALUATION ADULT - PLANNED THERAPY INTERVENTIONS, PT EVAL
stair negotiation GOAL: Patient will negotiate up / down 3 steps with min A x 1 in 2 weeks/balance training/bed mobility training/gait training/transfer training

## 2024-10-05 NOTE — PROGRESS NOTE ADULT - SUBJECTIVE AND OBJECTIVE BOX
Patient seen and examined at the bedside.    Remained critically ill on continuous ICU monitoring.    OBJECTIVE:  Vital Signs Last 24 Hrs  T(C): 37 (05 Oct 2024 08:00), Max: 37.8 (04 Oct 2024 20:00)  T(F): 98.6 (05 Oct 2024 08:00), Max: 100 (04 Oct 2024 20:00)  HR: 93 (05 Oct 2024 11:00) (90 - 128)  BP: 123/59 (05 Oct 2024 09:00) (93/50 - 133/55)  BP(mean): 85 (05 Oct 2024 09:00) (68 - 86)  RR: 28 (05 Oct 2024 11:00) (18 - 41)  SpO2: 100% (05 Oct 2024 11:00) (75% - 100%)    Parameters below as of 05 Oct 2024 12:00  Patient On (Oxygen Delivery Method): nasal cannula  O2 Flow (L/min): 3        Physical Exam:   General: NAD   Neurology: nonfocal   Eyes: bilateral pupils equal and reactive   ENT/Neck: Neck supple, trachea midline, No JVD   Respiratory: Clear bilaterally   CV: S1S2, no murmurs        [x] Sternal dressing  [x] Pleural CT- LP +KYAW        [x] Sinus rhythm  Abdominal: Soft, NT, ND +BS   Extremities: 1-2+ pedal edema noted, + peripheral pulses   Skin: No Rashes, Hematoma, Ecchymosis                           Assessment:  75 y/o F with CAD s/p CABG with SAMUEL on 10/03    Hypovolemia  Post op respiratory insufficiency  Acute blood loss anemia  Thrombocytopenia  Leukocytosis  At risk for delirium   At risk for hemodynamic decompensation  At high risk for cardiac arrhythmias   At high risk for respiratory complications        Plan:   ***Neuro***  [x] Nonfocal  Postop pain management with Tylenol, Gabapentin, and prns.  Post operative neuro assessment     ***Cardiovascular***  Invasive hemodynamic monitoring, assess perfusion indices   SR / CVP 8 / MAP 73 / Hct 27.2% / Lactate 1.7  [x] Amiodarone 1 mG/Min [x] Vasopressin 0.1 unit/Min  Volume:  [x] Albumin 25% 100 mL  Reassessment of hemodynamics post resuscitation   PO Amio for AF prophylaxis   Monitor chest tube outputs  [x] VTE ppx with Lovenox  [x]  ASA [x] Statin   Serial EKG and cardiac enzymes   [x] IABP d/c'ed yesterday    ***Pulmonary***  [x] NC- 3 L/Min    ***GI***  [x] Diet: Regular  [x] Reglan  Bowel Regimen    ***Renal***  Continue to monitor I/Os, BUN/Creatinine.   Replete lytes PRN  Beck present    ***ID***  No active antibiotic coverage      ***Endocrine***  [x] Stress Hyperglycemia [x]  DM2 : HbA1c 6.8%                - [x] Insulin gtt             - Need tight glycemic control to prevent wound infection.  [x] Synthroid for hypothyroidism            Patient requires continuous monitoring with bedside rhythm monitoring, pulse oximetry monitoring, and continuous central venous and arterial pressure monitoring; and intermittent blood gas analysis. Care plan discussed with the ICU care team.   Patient remained critical, at risk for life threatening decompensation.    I have spent 30 minutes providing critical care management to this patient.    By signing my name below, I, Omkar Diaz, attest that this documentation has been prepared under the direction and in the presence of AMELIA Munoz.  Electronically signed: Gena Bae, 10-05-24 @ 11:24    I, Aydin Houston, personally performed the services described in this documentation. all medical record entries made by the scribe were at my direction and in my presence. I have reviewed the chart and agree that the record reflects my personal performance and is accurate and complete  Electronically signed: AMELIA Munoz.

## 2024-10-05 NOTE — PHYSICAL THERAPY INITIAL EVALUATION ADULT - SITTING BALANCE: STATIC
Pt provided home nebulizer at this time  Education provided to pt's mother       Catalino Mobley, LUZ MARINA  01/18/20 [id number] fair balance

## 2024-10-05 NOTE — PHYSICAL THERAPY INITIAL EVALUATION ADULT - PERTINENT HX OF CURRENT PROBLEM, REHAB EVAL
74F w/ PMH of hypothyroidism, CVA x2 (2018), HTN, HLD and DM who initially p/t [facility] w/ worsening back pain, SOB, N/V and CP, transferred to [facility] for c/f STEMI/NSTEMI underwent R/LHC and found to have severe LM and LAD disease and moderate pRCA disease.   10/1/24: Carotid dopplers: No significant hemodynamic stenosis of either carotid artery.  Cardiac cath: Significant multivessel CAD with LM involvement. Filling pressures are elevated with normal CI. IABP was placed. Recommend CTS evaluation.     10/3/24: OR s/p CABGx3, LAAC  10/4/24: Extubated, IABP removed

## 2024-10-05 NOTE — PHYSICAL THERAPY INITIAL EVALUATION ADULT - GENERAL OBSERVATIONS, REHAB EVAL
Patient received sitting in chair in CTU, NAD, VSS, +3L O2 via NC, +Right IJ TLC, +Right brachial mar, +CTs x 3 to LWS, +hagan, +ICU monitors

## 2024-10-05 NOTE — PHYSICAL THERAPY INITIAL EVALUATION ADULT - ADDITIONAL COMMENTS
Patient lives in private home with son and daughter in law, 3 steps to enter, first floor set up. Patient ambulates with cane, has HHA 8 hours x 7 days, requires assistance with ADLS, owns a shower chair

## 2024-10-06 LAB
ALBUMIN SERPL ELPH-MCNC: 3.4 G/DL — SIGNIFICANT CHANGE UP (ref 3.3–5)
ALBUMIN SERPL ELPH-MCNC: 3.6 G/DL — SIGNIFICANT CHANGE UP (ref 3.3–5)
ALP SERPL-CCNC: 101 U/L — SIGNIFICANT CHANGE UP (ref 40–120)
ALP SERPL-CCNC: 120 U/L — SIGNIFICANT CHANGE UP (ref 40–120)
ALT FLD-CCNC: 641 U/L — HIGH (ref 10–45)
ALT FLD-CCNC: 672 U/L — HIGH (ref 10–45)
ANION GAP SERPL CALC-SCNC: 13 MMOL/L — SIGNIFICANT CHANGE UP (ref 5–17)
ANION GAP SERPL CALC-SCNC: 19 MMOL/L — HIGH (ref 5–17)
APPEARANCE UR: CLEAR — SIGNIFICANT CHANGE UP
APTT BLD: 24.7 SEC — SIGNIFICANT CHANGE UP (ref 24.5–35.6)
AST SERPL-CCNC: 1454 U/L — HIGH (ref 10–40)
AST SERPL-CCNC: 913 U/L — HIGH (ref 10–40)
BACTERIA # UR AUTO: NEGATIVE /HPF — SIGNIFICANT CHANGE UP
BASOPHILS # BLD AUTO: 0.06 K/UL — SIGNIFICANT CHANGE UP (ref 0–0.2)
BASOPHILS # BLD AUTO: 0.15 K/UL — SIGNIFICANT CHANGE UP (ref 0–0.2)
BASOPHILS NFR BLD AUTO: 0.2 % — SIGNIFICANT CHANGE UP (ref 0–2)
BASOPHILS NFR BLD AUTO: 0.4 % — SIGNIFICANT CHANGE UP (ref 0–2)
BILIRUB SERPL-MCNC: 1.2 MG/DL — SIGNIFICANT CHANGE UP (ref 0.2–1.2)
BILIRUB SERPL-MCNC: 1.7 MG/DL — HIGH (ref 0.2–1.2)
BILIRUB UR-MCNC: NEGATIVE — SIGNIFICANT CHANGE UP
BUN SERPL-MCNC: 29 MG/DL — HIGH (ref 7–23)
BUN SERPL-MCNC: 32 MG/DL — HIGH (ref 7–23)
CALCIUM SERPL-MCNC: 8.6 MG/DL — SIGNIFICANT CHANGE UP (ref 8.4–10.5)
CALCIUM SERPL-MCNC: 8.7 MG/DL — SIGNIFICANT CHANGE UP (ref 8.4–10.5)
CAST: 15 /LPF — HIGH (ref 0–4)
CHLORIDE SERPL-SCNC: 101 MMOL/L — SIGNIFICANT CHANGE UP (ref 96–108)
CHLORIDE SERPL-SCNC: 97 MMOL/L — SIGNIFICANT CHANGE UP (ref 96–108)
CO2 SERPL-SCNC: 23 MMOL/L — SIGNIFICANT CHANGE UP (ref 22–31)
CO2 SERPL-SCNC: 24 MMOL/L — SIGNIFICANT CHANGE UP (ref 22–31)
COLOR SPEC: YELLOW — SIGNIFICANT CHANGE UP
CREAT SERPL-MCNC: 1.25 MG/DL — SIGNIFICANT CHANGE UP (ref 0.5–1.3)
CREAT SERPL-MCNC: 1.36 MG/DL — HIGH (ref 0.5–1.3)
DIFF PNL FLD: ABNORMAL
EGFR: 41 ML/MIN/1.73M2 — LOW
EGFR: 45 ML/MIN/1.73M2 — LOW
EOSINOPHIL # BLD AUTO: 0.01 K/UL — SIGNIFICANT CHANGE UP (ref 0–0.5)
EOSINOPHIL # BLD AUTO: 0.01 K/UL — SIGNIFICANT CHANGE UP (ref 0–0.5)
EOSINOPHIL NFR BLD AUTO: 0 % — SIGNIFICANT CHANGE UP (ref 0–6)
EOSINOPHIL NFR BLD AUTO: 0 % — SIGNIFICANT CHANGE UP (ref 0–6)
GAS PNL BLDA: SIGNIFICANT CHANGE UP
GAS PNL BLDV: SIGNIFICANT CHANGE UP
GLUCOSE BLDC GLUCOMTR-MCNC: 117 MG/DL — HIGH (ref 70–99)
GLUCOSE BLDC GLUCOMTR-MCNC: 164 MG/DL — HIGH (ref 70–99)
GLUCOSE BLDC GLUCOMTR-MCNC: 172 MG/DL — HIGH (ref 70–99)
GLUCOSE BLDC GLUCOMTR-MCNC: 208 MG/DL — HIGH (ref 70–99)
GLUCOSE BLDC GLUCOMTR-MCNC: 210 MG/DL — HIGH (ref 70–99)
GLUCOSE BLDC GLUCOMTR-MCNC: 214 MG/DL — HIGH (ref 70–99)
GLUCOSE BLDC GLUCOMTR-MCNC: 241 MG/DL — HIGH (ref 70–99)
GLUCOSE BLDC GLUCOMTR-MCNC: 250 MG/DL — HIGH (ref 70–99)
GLUCOSE BLDC GLUCOMTR-MCNC: 269 MG/DL — HIGH (ref 70–99)
GLUCOSE BLDC GLUCOMTR-MCNC: 332 MG/DL — HIGH (ref 70–99)
GLUCOSE BLDC GLUCOMTR-MCNC: 96 MG/DL — SIGNIFICANT CHANGE UP (ref 70–99)
GLUCOSE SERPL-MCNC: 198 MG/DL — HIGH (ref 70–99)
GLUCOSE SERPL-MCNC: 201 MG/DL — HIGH (ref 70–99)
GLUCOSE UR QL: NEGATIVE MG/DL — SIGNIFICANT CHANGE UP
HCT VFR BLD CALC: 25.1 % — LOW (ref 34.5–45)
HCT VFR BLD CALC: 28.1 % — LOW (ref 34.5–45)
HGB BLD-MCNC: 8.5 G/DL — LOW (ref 11.5–15.5)
HGB BLD-MCNC: 9.6 G/DL — LOW (ref 11.5–15.5)
HYALINE CASTS # UR AUTO: PRESENT
IMM GRANULOCYTES NFR BLD AUTO: 4.2 % — HIGH (ref 0–0.9)
IMM GRANULOCYTES NFR BLD AUTO: 4.5 % — HIGH (ref 0–0.9)
INR BLD: 1.58 RATIO — HIGH (ref 0.85–1.16)
KETONES UR-MCNC: NEGATIVE MG/DL — SIGNIFICANT CHANGE UP
LEUKOCYTE ESTERASE UR-ACNC: NEGATIVE — SIGNIFICANT CHANGE UP
LYMPHOCYTES # BLD AUTO: 1.86 K/UL — SIGNIFICANT CHANGE UP (ref 1–3.3)
LYMPHOCYTES # BLD AUTO: 2.07 K/UL — SIGNIFICANT CHANGE UP (ref 1–3.3)
LYMPHOCYTES # BLD AUTO: 6.1 % — LOW (ref 13–44)
LYMPHOCYTES # BLD AUTO: 6.1 % — LOW (ref 13–44)
MAGNESIUM SERPL-MCNC: 2.3 MG/DL — SIGNIFICANT CHANGE UP (ref 1.6–2.6)
MCHC RBC-ENTMCNC: 28.9 PG — SIGNIFICANT CHANGE UP (ref 27–34)
MCHC RBC-ENTMCNC: 29.6 PG — SIGNIFICANT CHANGE UP (ref 27–34)
MCHC RBC-ENTMCNC: 33.9 GM/DL — SIGNIFICANT CHANGE UP (ref 32–36)
MCHC RBC-ENTMCNC: 34.2 GM/DL — SIGNIFICANT CHANGE UP (ref 32–36)
MCV RBC AUTO: 85.4 FL — SIGNIFICANT CHANGE UP (ref 80–100)
MCV RBC AUTO: 86.7 FL — SIGNIFICANT CHANGE UP (ref 80–100)
MONOCYTES # BLD AUTO: 1.71 K/UL — HIGH (ref 0–0.9)
MONOCYTES # BLD AUTO: 2.1 K/UL — HIGH (ref 0–0.9)
MONOCYTES NFR BLD AUTO: 5.7 % — SIGNIFICANT CHANGE UP (ref 2–14)
MONOCYTES NFR BLD AUTO: 6.2 % — SIGNIFICANT CHANGE UP (ref 2–14)
NEUTROPHILS # BLD AUTO: 25.33 K/UL — HIGH (ref 1.8–7.4)
NEUTROPHILS # BLD AUTO: 28.18 K/UL — HIGH (ref 1.8–7.4)
NEUTROPHILS NFR BLD AUTO: 82.8 % — HIGH (ref 43–77)
NEUTROPHILS NFR BLD AUTO: 83.8 % — HIGH (ref 43–77)
NITRITE UR-MCNC: NEGATIVE — SIGNIFICANT CHANGE UP
NRBC # BLD: 0 /100 WBCS — SIGNIFICANT CHANGE UP (ref 0–0)
NRBC # BLD: 1 /100 WBCS — HIGH (ref 0–0)
PH UR: 5.5 — SIGNIFICANT CHANGE UP (ref 5–8)
PHOSPHATE SERPL-MCNC: 2.3 MG/DL — LOW (ref 2.5–4.5)
PLATELET # BLD AUTO: 149 K/UL — LOW (ref 150–400)
PLATELET # BLD AUTO: 183 K/UL — SIGNIFICANT CHANGE UP (ref 150–400)
POTASSIUM SERPL-MCNC: 4.5 MMOL/L — SIGNIFICANT CHANGE UP (ref 3.5–5.3)
POTASSIUM SERPL-MCNC: 4.7 MMOL/L — SIGNIFICANT CHANGE UP (ref 3.5–5.3)
POTASSIUM SERPL-SCNC: 4.5 MMOL/L — SIGNIFICANT CHANGE UP (ref 3.5–5.3)
POTASSIUM SERPL-SCNC: 4.7 MMOL/L — SIGNIFICANT CHANGE UP (ref 3.5–5.3)
PROCALCITONIN SERPL-MCNC: 1.02 NG/ML — HIGH (ref 0.02–0.1)
PROT SERPL-MCNC: 6.1 G/DL — SIGNIFICANT CHANGE UP (ref 6–8.3)
PROT SERPL-MCNC: 6.6 G/DL — SIGNIFICANT CHANGE UP (ref 6–8.3)
PROT UR-MCNC: NEGATIVE MG/DL — SIGNIFICANT CHANGE UP
PROTHROM AB SERPL-ACNC: 18.1 SEC — HIGH (ref 9.9–13.4)
RBC # BLD: 2.94 M/UL — LOW (ref 3.8–5.2)
RBC # BLD: 3.24 M/UL — LOW (ref 3.8–5.2)
RBC # FLD: 15.2 % — HIGH (ref 10.3–14.5)
RBC # FLD: 15.6 % — HIGH (ref 10.3–14.5)
RBC CASTS # UR COMP ASSIST: 2 /HPF — SIGNIFICANT CHANGE UP (ref 0–4)
REVIEW: SIGNIFICANT CHANGE UP
SODIUM SERPL-SCNC: 138 MMOL/L — SIGNIFICANT CHANGE UP (ref 135–145)
SODIUM SERPL-SCNC: 139 MMOL/L — SIGNIFICANT CHANGE UP (ref 135–145)
SP GR SPEC: 1.01 — SIGNIFICANT CHANGE UP (ref 1–1.03)
SQUAMOUS # UR AUTO: 0 /HPF — SIGNIFICANT CHANGE UP (ref 0–5)
UROBILINOGEN FLD QL: 0.2 MG/DL — SIGNIFICANT CHANGE UP (ref 0.2–1)
WBC # BLD: 30.25 K/UL — HIGH (ref 3.8–10.5)
WBC # BLD: 34.04 K/UL — HIGH (ref 3.8–10.5)
WBC # FLD AUTO: 30.25 K/UL — HIGH (ref 3.8–10.5)
WBC # FLD AUTO: 34.04 K/UL — HIGH (ref 3.8–10.5)
WBC UR QL: 0 /HPF — SIGNIFICANT CHANGE UP (ref 0–5)

## 2024-10-06 PROCEDURE — 99291 CRITICAL CARE FIRST HOUR: CPT | Mod: 25

## 2024-10-06 PROCEDURE — 71045 X-RAY EXAM CHEST 1 VIEW: CPT | Mod: 26

## 2024-10-06 PROCEDURE — 93306 TTE W/DOPPLER COMPLETE: CPT | Mod: 26

## 2024-10-06 PROCEDURE — 36569 INSJ PICC 5 YR+ W/O IMAGING: CPT | Mod: RT

## 2024-10-06 RX ORDER — PIPERACILLIN SODIUM AND TAZOBACTAM SODIUM 12; 1.5 G/60ML; G/60ML
3.38 INJECTION, POWDER, LYOPHILIZED, FOR SOLUTION INTRAVENOUS EVERY 8 HOURS
Refills: 0 | Status: COMPLETED | OUTPATIENT
Start: 2024-10-07 | End: 2024-10-14

## 2024-10-06 RX ORDER — PIPERACILLIN SODIUM AND TAZOBACTAM SODIUM 12; 1.5 G/60ML; G/60ML
3.38 INJECTION, POWDER, LYOPHILIZED, FOR SOLUTION INTRAVENOUS ONCE
Refills: 0 | Status: COMPLETED | OUTPATIENT
Start: 2024-10-07 | End: 2024-10-07

## 2024-10-06 RX ORDER — MILRINONE LACTATE 1 MG/ML
0.12 VIAL (ML) INTRAVENOUS
Qty: 20 | Refills: 0 | Status: DISCONTINUED | OUTPATIENT
Start: 2024-10-06 | End: 2024-10-09

## 2024-10-06 RX ORDER — VANCOMYCIN HCL-SODIUM CHLORIDE IV SOLN 1.5 GM/250ML-0.9% 1.5-0.9/25 GM/ML-%
SOLUTION INTRAVENOUS
Refills: 0 | Status: DISCONTINUED | OUTPATIENT
Start: 2024-10-06 | End: 2024-10-07

## 2024-10-06 RX ORDER — PIPERACILLIN SODIUM AND TAZOBACTAM SODIUM 12; 1.5 G/60ML; G/60ML
3.38 INJECTION, POWDER, LYOPHILIZED, FOR SOLUTION INTRAVENOUS ONCE
Refills: 0 | Status: COMPLETED | OUTPATIENT
Start: 2024-10-06 | End: 2024-10-06

## 2024-10-06 RX ORDER — VANCOMYCIN HCL-SODIUM CHLORIDE IV SOLN 1.5 GM/250ML-0.9% 1.5-0.9/25 GM/ML-%
1000 SOLUTION INTRAVENOUS ONCE
Refills: 0 | Status: COMPLETED | OUTPATIENT
Start: 2024-10-06 | End: 2024-10-06

## 2024-10-06 RX ORDER — VANCOMYCIN HCL-SODIUM CHLORIDE IV SOLN 1.5 GM/250ML-0.9% 1.5-0.9/25 GM/ML-%
1000 SOLUTION INTRAVENOUS EVERY 12 HOURS
Refills: 0 | Status: DISCONTINUED | OUTPATIENT
Start: 2024-10-07 | End: 2024-10-07

## 2024-10-06 RX ORDER — 5-HYDROXYTRYPTOPHAN (5-HTP) 100 MG
5 TABLET,DISINTEGRATING ORAL AT BEDTIME
Refills: 0 | Status: DISCONTINUED | OUTPATIENT
Start: 2024-10-06 | End: 2024-10-14

## 2024-10-06 RX ORDER — MAGNESIUM SULFATE 500 MG/ML
2 VIAL (ML) INJECTION ONCE
Refills: 0 | Status: COMPLETED | OUTPATIENT
Start: 2024-10-06 | End: 2024-10-06

## 2024-10-06 RX ORDER — AMIODARONE HYDROCHLORIDE 50 MG/ML
150 INJECTION, SOLUTION INTRAVENOUS ONCE
Refills: 0 | Status: COMPLETED | OUTPATIENT
Start: 2024-10-06 | End: 2024-10-06

## 2024-10-06 RX ADMIN — Medication 500 MILLIGRAM(S): at 17:30

## 2024-10-06 RX ADMIN — AMIODARONE HYDROCHLORIDE 600 MILLIGRAM(S): 50 INJECTION, SOLUTION INTRAVENOUS at 14:55

## 2024-10-06 RX ADMIN — PIPERACILLIN SODIUM AND TAZOBACTAM SODIUM 25 GRAM(S): 12; 1.5 INJECTION, POWDER, LYOPHILIZED, FOR SOLUTION INTRAVENOUS at 19:44

## 2024-10-06 RX ADMIN — OXYCODONE HYDROCHLORIDE 10 MILLIGRAM(S): 30 TABLET, FILM COATED, EXTENDED RELEASE ORAL at 21:15

## 2024-10-06 RX ADMIN — GABAPENTIN 100 MILLIGRAM(S): 800 TABLET, FILM COATED ORAL at 05:55

## 2024-10-06 RX ADMIN — ENOXAPARIN SODIUM 40 MILLIGRAM(S): 150 INJECTION SUBCUTANEOUS at 12:39

## 2024-10-06 RX ADMIN — Medication 4.77 MICROGRAM(S)/KG/MIN: at 19:44

## 2024-10-06 RX ADMIN — Medication 3 UNIT(S)/HR: at 16:45

## 2024-10-06 RX ADMIN — Medication 500 MICROGRAM(S): at 16:00

## 2024-10-06 RX ADMIN — IPRATROPIUM BROMIDE AND ALBUTEROL SULFATE 3 MILLILITER(S): .5; 3 SOLUTION RESPIRATORY (INHALATION) at 23:56

## 2024-10-06 RX ADMIN — Medication 2 TABLET(S): at 21:18

## 2024-10-06 RX ADMIN — Medication 4.77 MICROGRAM(S)/KG/MIN: at 16:45

## 2024-10-06 RX ADMIN — OXYCODONE HYDROCHLORIDE 10 MILLIGRAM(S): 30 TABLET, FILM COATED, EXTENDED RELEASE ORAL at 21:45

## 2024-10-06 RX ADMIN — Medication 81 MILLIGRAM(S): at 12:39

## 2024-10-06 RX ADMIN — GABAPENTIN 100 MILLIGRAM(S): 800 TABLET, FILM COATED ORAL at 15:07

## 2024-10-06 RX ADMIN — Medication 50 MILLILITER(S): at 18:17

## 2024-10-06 RX ADMIN — IPRATROPIUM BROMIDE AND ALBUTEROL SULFATE 3 MILLILITER(S): .5; 3 SOLUTION RESPIRATORY (INHALATION) at 06:03

## 2024-10-06 RX ADMIN — Medication 50 MILLILITER(S): at 22:51

## 2024-10-06 RX ADMIN — Medication 500 MILLIGRAM(S): at 05:55

## 2024-10-06 RX ADMIN — Medication 20 MILLIEQUIVALENT(S): at 17:30

## 2024-10-06 RX ADMIN — PIPERACILLIN SODIUM AND TAZOBACTAM SODIUM 200 GRAM(S): 12; 1.5 INJECTION, POWDER, LYOPHILIZED, FOR SOLUTION INTRAVENOUS at 16:45

## 2024-10-06 RX ADMIN — VANCOMYCIN HCL-SODIUM CHLORIDE IV SOLN 1.5 GM/250ML-0.9% 250 MILLIGRAM(S): 1.5-0.9/25 SOLUTION at 16:45

## 2024-10-06 RX ADMIN — IPRATROPIUM BROMIDE AND ALBUTEROL SULFATE 3 MILLILITER(S): .5; 3 SOLUTION RESPIRATORY (INHALATION) at 11:46

## 2024-10-06 RX ADMIN — Medication 25 GRAM(S): at 15:52

## 2024-10-06 RX ADMIN — GABAPENTIN 100 MILLIGRAM(S): 800 TABLET, FILM COATED ORAL at 21:18

## 2024-10-06 RX ADMIN — Medication 75 MICROGRAM(S): at 05:55

## 2024-10-06 RX ADMIN — Medication 50 MILLILITER(S): at 17:40

## 2024-10-06 RX ADMIN — Medication 3 UNIT(S)/HR: at 19:46

## 2024-10-06 RX ADMIN — CHLORHEXIDINE GLUCONATE ORAL RINSE 1 APPLICATION(S): 1.2 SOLUTION DENTAL at 11:32

## 2024-10-06 RX ADMIN — Medication 5 MILLIGRAM(S): at 22:41

## 2024-10-06 NOTE — PROCEDURE NOTE - NSPROCDETAILS_GEN_ALL_CORE
location identified, draped/prepped, sterile technique used, needle inserted/introduced/positive blood return obtained via catheter/connected to a pressurized flush line/sutured in place/hemostasis with direct pressure, dressing applied/Seldinger technique/all materials/supplies accounted for at end of procedure
location identified, draped/prepped, sterile technique used, needle inserted/introduced/positive blood return obtained via catheter/connected to a pressurized flush line/sutured in place/hemostasis with direct pressure, dressing applied/Seldinger technique/all materials/supplies accounted for at end of procedure
location identified, draped/prepped, sterile technique used/sterile dressing applied/sterile technique, catheter placed/ultrasound guidance

## 2024-10-06 NOTE — PROCEDURE NOTE - NSINDICATIONS_GEN_A_CORE
critical patient/monitoring purposes
arterial puncture to obtain ABG's/blood sampling
antibiotic therapy/venous access

## 2024-10-06 NOTE — PROGRESS NOTE ADULT - SUBJECTIVE AND OBJECTIVE BOX
Patient seen and examined at the bedside.    Remains critically ill on continuous ICU monitoring, at risk for life threatening decompensation.  All Labs, data reviewed. Plan of care discussed in length during multi-disciplinary ICU rounds.       Brief Summary:  75 y/o F with CAD s/p CABG with SAMUEL on   10/03 : C3L, LAAC, pre op IABP  10/04: extubated, d/tameka IABP, RAL placed     24 Hour events:  Mobilizing with PT   Diuresing with Bumex drip & Lasix    Objective:  Vital Signs Last 24 Hrs  T(C): 36.8 (06 Oct 2024 08:00), Max: 37.2 (05 Oct 2024 12:00)  T(F): 98.2 (06 Oct 2024 08:00), Max: 99 (05 Oct 2024 12:00)  HR: 120 (06 Oct 2024 08:00) (80 - 120)  BP: 123/59 (05 Oct 2024 09:00) (116/56 - 123/59)  BP(mean): 85 (05 Oct 2024 09:00) (80 - 85)  RR: 39 (06 Oct 2024 08:00) (13 - 51)  SpO2: 84% (06 Oct 2024 08:00) (84% - 100%)    Parameters below as of 06 Oct 2024 08:00  Patient On (Oxygen Delivery Method): nasal cannula  O2 Flow (L/min): 3              Physical Exam:   General: Alert and interactive   Neurology: Oriented, following commands  Respiratory: Bilateral breast sounds   CV: Sinus Tachycardia /Afib   Abdominal: Soft, Nontender  Extremities: Warm, well-perfused  -------------------------------------------------------------------------------------------------------------------------------    Labs:                        8.5    30.25 )-----------( 149      ( 06 Oct 2024 00:42 )             25.1     10-06    138  |  101  |  29[H]  ----------------------------<  201[H]  4.5   |  24  |  1.25    Ca    8.7      06 Oct 2024 00:42  Phos  2.3     10-06  Mg     2.3     10-06    TPro  6.1  /  Alb  3.4  /  TBili  1.2  /  DBili  x   /  AST  1454[H]  /  ALT  672[H]  /  AlkPhos  101  10    LIVER FUNCTIONS - ( 06 Oct 2024 00:42 )  Alb: 3.4 g/dL / Pro: 6.1 g/dL / ALK PHOS: 101 U/L / ALT: 672 U/L / AST: 1454 U/L / GGT: x           PT/INR - ( 06 Oct 2024 00:42 )   PT: 18.1 sec;   INR: 1.58 ratio         PTT - ( 06 Oct 2024 00:42 )  PTT:24.7 sec  ABG - ( 06 Oct 2024 05:03 )  pH, Arterial: 7.48  pH, Blood: x     /  pCO2: 39    /  pO2: 114   / HCO3: 29    / Base Excess: 5.1   /  SaO2: 100.0     ALL MEDICATIONS   MEDICATIONS  (STANDING):  albuterol/ipratropium for Nebulization 3 milliLiter(s) Nebulizer every 8 hours  ascorbic acid 500 milliGRAM(s) Oral two times a day  aspirin enteric coated 81 milliGRAM(s) Oral daily  atorvastatin 80 milliGRAM(s) Oral at bedtime  bisacodyl Suppository 10 milliGRAM(s) Rectal once  buMETAnide Infusion 2 mG/Hr (10 mL/Hr) IV Continuous <Continuous>  chlorhexidine 2% Cloths 1 Application(s) Topical daily  dextrose 50% Injectable 50 milliLiter(s) IV Push every 15 minutes  enoxaparin Injectable 40 milliGRAM(s) SubCutaneous every 24 hours  furosemide   Injectable 20 milliGRAM(s) IV Push every 12 hours  gabapentin 100 milliGRAM(s) Oral every 8 hours  insulin regular Infusion 3 Unit(s)/Hr (3 mL/Hr) IV Continuous <Continuous>  levothyroxine 75 MICROGram(s) Oral daily  milrinone Infusion 0.2 MICROgram(s)/kG/Min (4.77 mL/Hr) IV Continuous <Continuous>  polyethylene glycol 3350 17 Gram(s) Oral daily  senna 2 Tablet(s) Oral at bedtime  sodium chloride 0.9%. 1000 milliLiter(s) (10 mL/Hr) IV Continuous <Continuous>  vasopressin Infusion 0.1 Unit(s)/Min (15 mL/Hr) IV Continuous <Continuous>    MEDICATIONS  (PRN):  oxyCODONE    IR 5 milliGRAM(s) Oral every 4 hours PRN Moderate Pain (4 - 6)  oxyCODONE    IR 10 milliGRAM(s) Oral every 4 hours PRN Severe Pain (7 - 10)    ------------------------------------------------------------------------------------------------------------------------------  Assessment:  75 y/o F with CAD s/p CABG with SAMUEL on 10/03  S/P IABP removal on 10/4     Hypovolemia  Post op respiratory insufficiency  Acute blood loss anemia  Thrombocytopenia  Leukocytosis  At risk for delirium   At risk for hemodynamic decompensation  At high risk for cardiac arrhythmias   At high risk for respiratory complications        Plan:   ***Neuro***  Maintain day/night cycle to prevent ICU delirium   Off sedation   Postoperative acute pain control with Tylenol, Gabapentin, Oxy, and prns.    ***Cardiovascular***  At high risk for hemodynamic instability and cardiac arrhythmias.  Monitor for sign of hypoperfusion  Maintain MAPs > 65 mm Hg. Titrate Vasopressin   remains on Primacor and Vaso. Keep CVP<10., weaned off    Afib - Amio as needed   ASA, statin daily  Monitor chest,  tube output.    ***Pulmonary***  Postoperative acute respiratory insufficiency  Deep breathing and coughing exercises, IS, Mobilization, nebs, Chest PT  Continue use of bronchodilators as needed     ***GI***  cardiac diet  Protonix for stress ulcer prophylaxis   Bowel regimen.   Trend LFTs  Reglan for gut motility    ***Renal***  Trend Creatinine/BUN  Beck catheter for strict I/O measurements.    Replete electrolytes as indicated.  Diuresis with Bumex gtt & Lasix    ***ID***  Leukocytosis   Monitor off antibiotic coverage   Secondary prophylaxis     ***Endocrine***  Insulin infusion per protocol for DM  Levothyroxine for Hypothyroidism         ***Hematology***  Acute blood loss anemia and thrombocytopenia   Transfused 4prbc, 1 cryo, 1 plt, 1 FFP in OR 10/3, no acute transfusion indication at this time  SQ Lovenox for DVT prophylaxis             I, Keo Curran MD, personally performed the services described in this documentation. all medical record entries made by the scribe were at my direction and in my presence. I have reviewed the chart and agree that the record reflects my personal performance and is accurate and complete  Electronically signed:   Keo Curran MD  CT ICU attending     ICU time: ** mins     By signing my name below, I, Nikole Bryant, attest that this documentation has been prepared under the direction and in the presence of Keo Curran MD  Electronically signed: Nikole Bryant, 10-06-24 @ 08:11             Patient seen and examined at the bedside.    Remains critically ill on continuous ICU monitoring, at risk for life threatening decompensation.  All Labs, data reviewed. Plan of care discussed in length during multi-disciplinary ICU rounds.       Brief Summary:  75 y/o F with CAD s/p CABG with SAMUEL on   10/03 : C3L, LAAC, pre op IABP  10/04: extubated, d/tameka IABP, RAL placed   10/5: started on Lisset 0.2, elevated lactate, LFTs    24 Hour events:  yesterday  has been off since 11 AM  And in the evening noted to have elevated lactate. cxr congested. Low Svo2, LFTs in 1000's  Started on lisset, Bumex gtt. Amio has been d/tameka. Held statin today  got a formal 2decho which showed clot around the RV. IVC not dilated. no emergent need for drainage, however will closely monitor hemodynamics.   d/tameka Bumex and give her some volume.  Ambulated from bed to chair  on 6 L o2 NC  procal elevated 1.2. Pan cx and started on Vanc+Zosyn  Right UQ US  Avoid amio and dig loaded for Afib rvr    Objective:  Vital Signs Last 24 Hrs  T(C): 36.8 (06 Oct 2024 08:00), Max: 37.2 (05 Oct 2024 12:00)  T(F): 98.2 (06 Oct 2024 08:00), Max: 99 (05 Oct 2024 12:00)  HR: 120 (06 Oct 2024 08:00) (80 - 120)  BP: 123/59 (05 Oct 2024 09:00) (116/56 - 123/59)  BP(mean): 85 (05 Oct 2024 09:00) (80 - 85)  RR: 39 (06 Oct 2024 08:00) (13 - 51)  SpO2: 84% (06 Oct 2024 08:00) (84% - 100%)    Parameters below as of 06 Oct 2024 08:00  Patient On (Oxygen Delivery Method): nasal cannula  O2 Flow (L/min): 3              Physical Exam:   General: Alert and interactive   Neurology: Oriented, following commands  Respiratory: Bilateral breast sounds   CV: Sinus Tachycardia /Afib   Abdominal: Soft, Nontender  Extremities: Warm, well-perfused  -------------------------------------------------------------------------------------------------------------------------------    Labs:                        8.5    30.25 )-----------( 149      ( 06 Oct 2024 00:42 )             25.1     10-    138  |  101  |  29[H]  ----------------------------<  201[H]  4.5   |  24  |  1.25    Ca    8.7      06 Oct 2024 00:42  Phos  2.3     10-  Mg     2.3     10-    TPro  6.1  /  Alb  3.4  /  TBili  1.2  /  DBili  x   /  AST  1454[H]  /  ALT  672[H]  /  AlkPhos  101  10-06    LIVER FUNCTIONS - ( 06 Oct 2024 00:42 )  Alb: 3.4 g/dL / Pro: 6.1 g/dL / ALK PHOS: 101 U/L / ALT: 672 U/L / AST: 1454 U/L / GGT: x           PT/INR - ( 06 Oct 2024 00:42 )   PT: 18.1 sec;   INR: 1.58 ratio         PTT - ( 06 Oct 2024 00:42 )  PTT:24.7 sec  ABG - ( 06 Oct 2024 05:03 )  pH, Arterial: 7.48  pH, Blood: x     /  pCO2: 39    /  pO2: 114   / HCO3: 29    / Base Excess: 5.1   /  SaO2: 100.0     ALL MEDICATIONS   MEDICATIONS  (STANDING):  albuterol/ipratropium for Nebulization 3 milliLiter(s) Nebulizer every 8 hours  ascorbic acid 500 milliGRAM(s) Oral two times a day  aspirin enteric coated 81 milliGRAM(s) Oral daily  atorvastatin 80 milliGRAM(s) Oral at bedtime  bisacodyl Suppository 10 milliGRAM(s) Rectal once  buMETAnide Infusion 2 mG/Hr (10 mL/Hr) IV Continuous <Continuous>  chlorhexidine 2% Cloths 1 Application(s) Topical daily  dextrose 50% Injectable 50 milliLiter(s) IV Push every 15 minutes  enoxaparin Injectable 40 milliGRAM(s) SubCutaneous every 24 hours  furosemide   Injectable 20 milliGRAM(s) IV Push every 12 hours  gabapentin 100 milliGRAM(s) Oral every 8 hours  insulin regular Infusion 3 Unit(s)/Hr (3 mL/Hr) IV Continuous <Continuous>  levothyroxine 75 MICROGram(s) Oral daily  milrinone Infusion 0.2 MICROgram(s)/kG/Min (4.77 mL/Hr) IV Continuous <Continuous>  polyethylene glycol 3350 17 Gram(s) Oral daily  senna 2 Tablet(s) Oral at bedtime  sodium chloride 0.9%. 1000 milliLiter(s) (10 mL/Hr) IV Continuous <Continuous>  vasopressin Infusion 0.1 Unit(s)/Min (15 mL/Hr) IV Continuous <Continuous>    MEDICATIONS  (PRN):  oxyCODONE    IR 5 milliGRAM(s) Oral every 4 hours PRN Moderate Pain (4 - 6)  oxyCODONE    IR 10 milliGRAM(s) Oral every 4 hours PRN Severe Pain (7 - 10)    ------------------------------------------------------------------------------------------------------------------------------  Assessment:  75 y/o F with CAD s/p CABG with SAMUEL on 10/03  S/P IABP removal on 10/4     Hypovolemia  Post op respiratory insufficiency  Acute blood loss anemia  Thrombocytopenia  Leukocytosis  At risk for delirium   At risk for hemodynamic decompensation  At high risk for cardiac arrhythmias   At high risk for respiratory complications        Plan:   ***Neuro***  Maintain day/night cycle to prevent ICU delirium   Postoperative acute pain control with Tylenol, Gabapentin, Oxy, and prns.    ***Cardiovascular***  At high risk for hemodynamic instability and cardiac arrhythmias.  Monitor for sign of hypoperfusion  Maintain MAPs > 65 mm Hg. Titrate Vasopressin   remains on Lisset 0.2 Keep CVP<10.  Hold Amio and statin in view of Transaminitis  Dig load for Afib rvr. Hold BB while on milirinone  ASA daily  Monitor chest tube output.   Formal 2decho today which showed clot around the RV. IVC not dilated. no emergent need for drainage, however will closely monitor hemodynamics.     ***Pulmonary***  Postoperative acute respiratory insufficiency on 6 L o2 nC  Deep breathing and coughing exercises, IS, Mobilization, nebs, Chest PT  Continue use of bronchodilators as needed     ***GI***  cardiac diet  Protonix for stress ulcer prophylaxis   Bowel regimen.   Trend LFTs  Reglan for gut motility    ***Renal***  Trend Creatinine/BUN  Replete electrolytes as indicated.  Holding diuresis and give Albumin    ***ID***  Leukocytosis   elevated procal  Pan cx and started on Vanc+Zosyn    ***Endocrine***  Insulin infusion per protocol for DM  Levothyroxine for Hypothyroidism         ***Hematology***  Acute blood loss anemia and thrombocytopenia   Transfused 4prbc, 1 cryo, 1 plt, 1 FFP in OR 10/3, no acute transfusion indication at this time  SQ Lovenox for DVT prophylaxis             I, Keo Curran MD, personally performed the services described in this documentation. all medical record entries made by the scribe were at my direction and in my presence. I have reviewed the chart and agree that the record reflects my personal performance and is accurate and complete  Electronically signed:   Keo Curran MD  CT ICU attending     ICU time: 53 mins     By signing my name below, I, Nikole Bryant, attest that this documentation has been prepared under the direction and in the presence of Keo Curran MD  Electronically signed: Nikole Bryant, 10-06-24 @ 08:11

## 2024-10-07 LAB
ALBUMIN SERPL ELPH-MCNC: 3.7 G/DL — SIGNIFICANT CHANGE UP (ref 3.3–5)
ALBUMIN SERPL ELPH-MCNC: 4 G/DL — SIGNIFICANT CHANGE UP (ref 3.3–5)
ALP SERPL-CCNC: 101 U/L — SIGNIFICANT CHANGE UP (ref 40–120)
ALP SERPL-CCNC: 109 U/L — SIGNIFICANT CHANGE UP (ref 40–120)
ALT FLD-CCNC: 422 U/L — HIGH (ref 10–45)
ALT FLD-CCNC: 475 U/L — HIGH (ref 10–45)
ANION GAP SERPL CALC-SCNC: 13 MMOL/L — SIGNIFICANT CHANGE UP (ref 5–17)
ANION GAP SERPL CALC-SCNC: 15 MMOL/L — SIGNIFICANT CHANGE UP (ref 5–17)
ANISOCYTOSIS BLD QL: SLIGHT — SIGNIFICANT CHANGE UP
APTT BLD: 25.3 SEC — SIGNIFICANT CHANGE UP (ref 24.5–35.6)
AST SERPL-CCNC: 362 U/L — HIGH (ref 10–40)
AST SERPL-CCNC: 536 U/L — HIGH (ref 10–40)
BASOPHILS # BLD AUTO: 0 K/UL — SIGNIFICANT CHANGE UP (ref 0–0.2)
BASOPHILS # BLD AUTO: 0 K/UL — SIGNIFICANT CHANGE UP (ref 0–0.2)
BASOPHILS NFR BLD AUTO: 0 % — SIGNIFICANT CHANGE UP (ref 0–2)
BASOPHILS NFR BLD AUTO: 0 % — SIGNIFICANT CHANGE UP (ref 0–2)
BILIRUB SERPL-MCNC: 1.1 MG/DL — SIGNIFICANT CHANGE UP (ref 0.2–1.2)
BILIRUB SERPL-MCNC: 1.6 MG/DL — HIGH (ref 0.2–1.2)
BUN SERPL-MCNC: 29 MG/DL — HIGH (ref 7–23)
BUN SERPL-MCNC: 31 MG/DL — HIGH (ref 7–23)
CALCIUM SERPL-MCNC: 8.1 MG/DL — LOW (ref 8.4–10.5)
CALCIUM SERPL-MCNC: 8.2 MG/DL — LOW (ref 8.4–10.5)
CHLORIDE SERPL-SCNC: 90 MMOL/L — LOW (ref 96–108)
CHLORIDE SERPL-SCNC: 96 MMOL/L — SIGNIFICANT CHANGE UP (ref 96–108)
CO2 SERPL-SCNC: 26 MMOL/L — SIGNIFICANT CHANGE UP (ref 22–31)
CO2 SERPL-SCNC: 26 MMOL/L — SIGNIFICANT CHANGE UP (ref 22–31)
CREAT SERPL-MCNC: 1.28 MG/DL — SIGNIFICANT CHANGE UP (ref 0.5–1.3)
CREAT SERPL-MCNC: 1.32 MG/DL — HIGH (ref 0.5–1.3)
EGFR: 42 ML/MIN/1.73M2 — LOW
EGFR: 44 ML/MIN/1.73M2 — LOW
EOSINOPHIL # BLD AUTO: 0 K/UL — SIGNIFICANT CHANGE UP (ref 0–0.5)
EOSINOPHIL # BLD AUTO: 0 K/UL — SIGNIFICANT CHANGE UP (ref 0–0.5)
EOSINOPHIL NFR BLD AUTO: 0 % — SIGNIFICANT CHANGE UP (ref 0–6)
EOSINOPHIL NFR BLD AUTO: 0 % — SIGNIFICANT CHANGE UP (ref 0–6)
GAS PNL BLDA: SIGNIFICANT CHANGE UP
GAS PNL BLDA: SIGNIFICANT CHANGE UP
GLUCOSE BLDC GLUCOMTR-MCNC: 115 MG/DL — HIGH (ref 70–99)
GLUCOSE BLDC GLUCOMTR-MCNC: 118 MG/DL — HIGH (ref 70–99)
GLUCOSE BLDC GLUCOMTR-MCNC: 121 MG/DL — HIGH (ref 70–99)
GLUCOSE BLDC GLUCOMTR-MCNC: 121 MG/DL — HIGH (ref 70–99)
GLUCOSE BLDC GLUCOMTR-MCNC: 122 MG/DL — HIGH (ref 70–99)
GLUCOSE BLDC GLUCOMTR-MCNC: 130 MG/DL — HIGH (ref 70–99)
GLUCOSE BLDC GLUCOMTR-MCNC: 131 MG/DL — HIGH (ref 70–99)
GLUCOSE BLDC GLUCOMTR-MCNC: 133 MG/DL — HIGH (ref 70–99)
GLUCOSE BLDC GLUCOMTR-MCNC: 136 MG/DL — HIGH (ref 70–99)
GLUCOSE BLDC GLUCOMTR-MCNC: 137 MG/DL — HIGH (ref 70–99)
GLUCOSE BLDC GLUCOMTR-MCNC: 141 MG/DL — HIGH (ref 70–99)
GLUCOSE BLDC GLUCOMTR-MCNC: 143 MG/DL — HIGH (ref 70–99)
GLUCOSE BLDC GLUCOMTR-MCNC: 143 MG/DL — HIGH (ref 70–99)
GLUCOSE BLDC GLUCOMTR-MCNC: 147 MG/DL — HIGH (ref 70–99)
GLUCOSE BLDC GLUCOMTR-MCNC: 155 MG/DL — HIGH (ref 70–99)
GLUCOSE BLDC GLUCOMTR-MCNC: 160 MG/DL — HIGH (ref 70–99)
GLUCOSE BLDC GLUCOMTR-MCNC: 163 MG/DL — HIGH (ref 70–99)
GLUCOSE BLDC GLUCOMTR-MCNC: 174 MG/DL — HIGH (ref 70–99)
GLUCOSE BLDC GLUCOMTR-MCNC: 184 MG/DL — HIGH (ref 70–99)
GLUCOSE BLDC GLUCOMTR-MCNC: 184 MG/DL — HIGH (ref 70–99)
GLUCOSE BLDC GLUCOMTR-MCNC: 185 MG/DL — HIGH (ref 70–99)
GLUCOSE BLDC GLUCOMTR-MCNC: 189 MG/DL — HIGH (ref 70–99)
GLUCOSE BLDC GLUCOMTR-MCNC: 225 MG/DL — HIGH (ref 70–99)
GLUCOSE BLDC GLUCOMTR-MCNC: 242 MG/DL — HIGH (ref 70–99)
GLUCOSE SERPL-MCNC: 178 MG/DL — HIGH (ref 70–99)
GLUCOSE SERPL-MCNC: 242 MG/DL — HIGH (ref 70–99)
HCT VFR BLD CALC: 23.7 % — LOW (ref 34.5–45)
HCT VFR BLD CALC: 27.3 % — LOW (ref 34.5–45)
HGB BLD-MCNC: 8.1 G/DL — LOW (ref 11.5–15.5)
HGB BLD-MCNC: 8.9 G/DL — LOW (ref 11.5–15.5)
LYMPHOCYTES # BLD AUTO: 0.83 K/UL — LOW (ref 1–3.3)
LYMPHOCYTES # BLD AUTO: 1.21 K/UL — SIGNIFICANT CHANGE UP (ref 1–3.3)
LYMPHOCYTES # BLD AUTO: 3.2 % — LOW (ref 13–44)
LYMPHOCYTES # BLD AUTO: 5.3 % — LOW (ref 13–44)
MAGNESIUM SERPL-MCNC: 1.9 MG/DL — SIGNIFICANT CHANGE UP (ref 1.6–2.6)
MAGNESIUM SERPL-MCNC: 2.3 MG/DL — SIGNIFICANT CHANGE UP (ref 1.6–2.6)
MANUAL SMEAR VERIFICATION: SIGNIFICANT CHANGE UP
MANUAL SMEAR VERIFICATION: SIGNIFICANT CHANGE UP
MCHC RBC-ENTMCNC: 29.3 PG — SIGNIFICANT CHANGE UP (ref 27–34)
MCHC RBC-ENTMCNC: 29.4 PG — SIGNIFICANT CHANGE UP (ref 27–34)
MCHC RBC-ENTMCNC: 32.6 GM/DL — SIGNIFICANT CHANGE UP (ref 32–36)
MCHC RBC-ENTMCNC: 34.2 GM/DL — SIGNIFICANT CHANGE UP (ref 32–36)
MCV RBC AUTO: 85.9 FL — SIGNIFICANT CHANGE UP (ref 80–100)
MCV RBC AUTO: 90.1 FL — SIGNIFICANT CHANGE UP (ref 80–100)
METAMYELOCYTES # FLD: 0.8 % — HIGH (ref 0–0)
MICROCYTES BLD QL: SLIGHT — SIGNIFICANT CHANGE UP
MONOCYTES # BLD AUTO: 1.46 K/UL — HIGH (ref 0–0.9)
MONOCYTES # BLD AUTO: 1.68 K/UL — HIGH (ref 0–0.9)
MONOCYTES NFR BLD AUTO: 6.4 % — SIGNIFICANT CHANGE UP (ref 2–14)
MONOCYTES NFR BLD AUTO: 6.5 % — SIGNIFICANT CHANGE UP (ref 2–14)
MYELOCYTES NFR BLD: 1.1 % — HIGH (ref 0–0)
NEUTROPHILS # BLD AUTO: 19.89 K/UL — HIGH (ref 1.8–7.4)
NEUTROPHILS # BLD AUTO: 23.09 K/UL — HIGH (ref 1.8–7.4)
NEUTROPHILS NFR BLD AUTO: 86.2 % — HIGH (ref 43–77)
NEUTROPHILS NFR BLD AUTO: 86.3 % — HIGH (ref 43–77)
NEUTS BAND # BLD: 1 % — SIGNIFICANT CHANGE UP (ref 0–8)
NEUTS BAND # BLD: 3.2 % — SIGNIFICANT CHANGE UP (ref 0–8)
NRBC # BLD: 11 /100 WBCS — HIGH (ref 0–0)
NRBC # BLD: 2 /100 WBCS — HIGH (ref 0–0)
OVALOCYTES BLD QL SMEAR: SLIGHT — SIGNIFICANT CHANGE UP
PHOSPHATE SERPL-MCNC: 2.1 MG/DL — LOW (ref 2.5–4.5)
PHOSPHATE SERPL-MCNC: 3.2 MG/DL — SIGNIFICANT CHANGE UP (ref 2.5–4.5)
PLAT MORPH BLD: NORMAL — SIGNIFICANT CHANGE UP
PLAT MORPH BLD: NORMAL — SIGNIFICANT CHANGE UP
PLATELET # BLD AUTO: 148 K/UL — LOW (ref 150–400)
PLATELET # BLD AUTO: 167 K/UL — SIGNIFICANT CHANGE UP (ref 150–400)
POLYCHROMASIA BLD QL SMEAR: SLIGHT — SIGNIFICANT CHANGE UP
POTASSIUM SERPL-MCNC: 3.5 MMOL/L — SIGNIFICANT CHANGE UP (ref 3.5–5.3)
POTASSIUM SERPL-MCNC: 4.3 MMOL/L — SIGNIFICANT CHANGE UP (ref 3.5–5.3)
POTASSIUM SERPL-SCNC: 3.5 MMOL/L — SIGNIFICANT CHANGE UP (ref 3.5–5.3)
POTASSIUM SERPL-SCNC: 4.3 MMOL/L — SIGNIFICANT CHANGE UP (ref 3.5–5.3)
PROT SERPL-MCNC: 6.1 G/DL — SIGNIFICANT CHANGE UP (ref 6–8.3)
PROT SERPL-MCNC: 6.5 G/DL — SIGNIFICANT CHANGE UP (ref 6–8.3)
RBC # BLD: 2.76 M/UL — LOW (ref 3.8–5.2)
RBC # BLD: 3.03 M/UL — LOW (ref 3.8–5.2)
RBC # FLD: 15.5 % — HIGH (ref 10.3–14.5)
RBC # FLD: 15.6 % — HIGH (ref 10.3–14.5)
RBC BLD AUTO: ABNORMAL
RBC BLD AUTO: SIGNIFICANT CHANGE UP
SODIUM SERPL-SCNC: 131 MMOL/L — LOW (ref 135–145)
SODIUM SERPL-SCNC: 135 MMOL/L — SIGNIFICANT CHANGE UP (ref 135–145)
VANCOMYCIN TROUGH SERPL-MCNC: 10.8 UG/ML — SIGNIFICANT CHANGE UP (ref 10–20)
VANCOMYCIN TROUGH SERPL-MCNC: 17.9 UG/ML — SIGNIFICANT CHANGE UP (ref 10–20)
WBC # BLD: 22.81 K/UL — HIGH (ref 3.8–10.5)
WBC # BLD: 25.8 K/UL — HIGH (ref 3.8–10.5)
WBC # FLD AUTO: 22.81 K/UL — HIGH (ref 3.8–10.5)
WBC # FLD AUTO: 25.8 K/UL — HIGH (ref 3.8–10.5)

## 2024-10-07 PROCEDURE — 71045 X-RAY EXAM CHEST 1 VIEW: CPT | Mod: 26

## 2024-10-07 PROCEDURE — 99291 CRITICAL CARE FIRST HOUR: CPT

## 2024-10-07 PROCEDURE — 76705 ECHO EXAM OF ABDOMEN: CPT | Mod: 26

## 2024-10-07 RX ORDER — VANCOMYCIN HCL-SODIUM CHLORIDE IV SOLN 1.5 GM/250ML-0.9% 1.5-0.9/25 GM/ML-%
1000 SOLUTION INTRAVENOUS EVERY 24 HOURS
Refills: 0 | Status: DISCONTINUED | OUTPATIENT
Start: 2024-10-08 | End: 2024-10-08

## 2024-10-07 RX ORDER — ONDANSETRON HCL/PF 4 MG/2 ML
4 VIAL (ML) INJECTION ONCE
Refills: 0 | Status: COMPLETED | OUTPATIENT
Start: 2024-10-07 | End: 2024-10-07

## 2024-10-07 RX ORDER — BUMETANIDE 2 MG/1
1 TABLET ORAL ONCE
Refills: 0 | Status: COMPLETED | OUTPATIENT
Start: 2024-10-07 | End: 2024-10-07

## 2024-10-07 RX ORDER — SODIUM PHOSPHATE IN D5W 15MMOL/250
15 PLASTIC BAG, INJECTION (ML) INTRAVENOUS ONCE
Refills: 0 | Status: COMPLETED | OUTPATIENT
Start: 2024-10-07 | End: 2024-10-07

## 2024-10-07 RX ORDER — FUROSEMIDE 10 MG/ML
40 INJECTION INTRAVENOUS ONCE
Refills: 0 | Status: COMPLETED | OUTPATIENT
Start: 2024-10-07 | End: 2024-10-07

## 2024-10-07 RX ADMIN — GABAPENTIN 100 MILLIGRAM(S): 800 TABLET, FILM COATED ORAL at 21:34

## 2024-10-07 RX ADMIN — Medication 500 MILLIGRAM(S): at 17:35

## 2024-10-07 RX ADMIN — GABAPENTIN 100 MILLIGRAM(S): 800 TABLET, FILM COATED ORAL at 14:15

## 2024-10-07 RX ADMIN — PIPERACILLIN SODIUM AND TAZOBACTAM SODIUM 25 GRAM(S): 12; 1.5 INJECTION, POWDER, LYOPHILIZED, FOR SOLUTION INTRAVENOUS at 15:18

## 2024-10-07 RX ADMIN — Medication 3 UNIT(S)/HR: at 17:36

## 2024-10-07 RX ADMIN — Medication 20 MILLIEQUIVALENT(S): at 10:20

## 2024-10-07 RX ADMIN — PIPERACILLIN SODIUM AND TAZOBACTAM SODIUM 25 GRAM(S): 12; 1.5 INJECTION, POWDER, LYOPHILIZED, FOR SOLUTION INTRAVENOUS at 08:20

## 2024-10-07 RX ADMIN — CHLORHEXIDINE GLUCONATE ORAL RINSE 1 APPLICATION(S): 1.2 SOLUTION DENTAL at 12:50

## 2024-10-07 RX ADMIN — Medication 200 GRAM(S): at 01:03

## 2024-10-07 RX ADMIN — IPRATROPIUM BROMIDE AND ALBUTEROL SULFATE 3 MILLILITER(S): .5; 3 SOLUTION RESPIRATORY (INHALATION) at 14:57

## 2024-10-07 RX ADMIN — Medication 75 MICROGRAM(S): at 06:24

## 2024-10-07 RX ADMIN — VANCOMYCIN HCL-SODIUM CHLORIDE IV SOLN 1.5 GM/250ML-0.9% 250 MILLIGRAM(S): 1.5-0.9/25 SOLUTION at 06:53

## 2024-10-07 RX ADMIN — Medication 5000 UNIT(S): at 17:35

## 2024-10-07 RX ADMIN — Medication 4.77 MICROGRAM(S)/KG/MIN: at 17:36

## 2024-10-07 RX ADMIN — BUMETANIDE 1 MILLIGRAM(S): 2 TABLET ORAL at 14:15

## 2024-10-07 RX ADMIN — Medication 250 MICROGRAM(S): at 00:19

## 2024-10-07 RX ADMIN — Medication 50 MILLILITER(S): at 14:21

## 2024-10-07 RX ADMIN — Medication 500 MILLIGRAM(S): at 06:26

## 2024-10-07 RX ADMIN — Medication 50 MILLILITER(S): at 21:33

## 2024-10-07 RX ADMIN — GABAPENTIN 100 MILLIGRAM(S): 800 TABLET, FILM COATED ORAL at 06:24

## 2024-10-07 RX ADMIN — Medication 81 MILLIGRAM(S): at 12:49

## 2024-10-07 RX ADMIN — Medication 40 MILLIEQUIVALENT(S): at 01:04

## 2024-10-07 RX ADMIN — Medication 63.75 MILLIMOLE(S): at 01:40

## 2024-10-07 RX ADMIN — Medication 4 MILLIGRAM(S): at 17:35

## 2024-10-07 RX ADMIN — ENOXAPARIN SODIUM 40 MILLIGRAM(S): 150 INJECTION SUBCUTANEOUS at 08:20

## 2024-10-07 RX ADMIN — Medication 2 TABLET(S): at 21:34

## 2024-10-07 RX ADMIN — Medication 250 MICROGRAM(S): at 08:19

## 2024-10-07 RX ADMIN — IPRATROPIUM BROMIDE AND ALBUTEROL SULFATE 3 MILLILITER(S): .5; 3 SOLUTION RESPIRATORY (INHALATION) at 22:43

## 2024-10-07 RX ADMIN — PIPERACILLIN SODIUM AND TAZOBACTAM SODIUM 25 GRAM(S): 12; 1.5 INJECTION, POWDER, LYOPHILIZED, FOR SOLUTION INTRAVENOUS at 21:34

## 2024-10-07 RX ADMIN — Medication 50 MILLILITER(S): at 06:25

## 2024-10-07 RX ADMIN — Medication 17 GRAM(S): at 12:49

## 2024-10-07 RX ADMIN — Medication 5 MILLIGRAM(S): at 21:34

## 2024-10-07 RX ADMIN — FUROSEMIDE 40 MILLIGRAM(S): 10 INJECTION INTRAVENOUS at 08:22

## 2024-10-07 RX ADMIN — IPRATROPIUM BROMIDE AND ALBUTEROL SULFATE 3 MILLILITER(S): .5; 3 SOLUTION RESPIRATORY (INHALATION) at 06:23

## 2024-10-07 RX ADMIN — PIPERACILLIN SODIUM AND TAZOBACTAM SODIUM 25 GRAM(S): 12; 1.5 INJECTION, POWDER, LYOPHILIZED, FOR SOLUTION INTRAVENOUS at 01:04

## 2024-10-07 NOTE — OCCUPATIONAL THERAPY INITIAL EVALUATION ADULT - PERTINENT HX OF CURRENT PROBLEM, REHAB EVAL
73 y/o female w/ PMH of hypothyroidism, CVA x2 (2018), HTN, HLD and DM who initially p/t [place] w/ worsening back pain, SOB, N/V and CP. She was transferred as a Tier I w/ c/f NSTEMI and hypotension (SBP 80s). Her son was at bedside and says she it typically mobile with her walker and has never had such back pain or CP. ECG with ST depressions I, II, aVL, V2-V5 and ST elevations aVR, III, V1. She was ASA and brilinta and heparin loaded at OSH. Pt. underwent R/LHC found to have severe LM and LAD disease and moderate pRCA disease. IABP placed for coronary perfusion and admitted to CICU for CABG eval. On arrival to unit pt. endorses nausea and had one episode of emesis relieved w/ antiemetics.  10/03 : C3L, LAAC, pre op IABP  10/04: extubated, d/tameka IABP, RAL placed

## 2024-10-07 NOTE — OCCUPATIONAL THERAPY INITIAL EVALUATION ADULT - PATIENT/FAMILY/SIGNIFICANT OTHER GOALS STATEMENT, OT EVAL
----- Message from Luana Mera PA-C sent at 9/22/2022 12:28 PM CDT -----  Please schedule 6 month ear cleaning at [facility] (prefers AM) and she will see it in MyChart.  Thanks   none stated

## 2024-10-07 NOTE — PROGRESS NOTE ADULT - SUBJECTIVE AND OBJECTIVE BOX
POD # 4  10/3 CABG X  3 AGNIESZKA clip     Brief history : 75 yo with multiple medical problems who presented to ED on 10/1 with chest and back pain,  EKG reportedly showed diffuse ST depression, + trop ruled in for NSTEMI.  ASA/brillinta loaded and transferred to [facility] ED for Emergent LHC. During transfer and on arrival had some hemodynamic instability and per notes pt endorsed having CP since 9/27    10/1 LHC Severe multivessel CAD including 99% LM , s/p IABP insertion   10/1 Echo EF nl , mod MR    PMH: CVA x 2, hypothyroidism HTN, Asthma     10/3 OR CABG X 3 AGNIESZKA clip SAMUEL -45% mod MR   10/4 IABP removed  10/5- inotrope weaned - later noted to have elevated Lactate, rising LFTs, milrinone restarted, also concern for pulm congestion - bumex drip started   10/6 LFTs peaked AST/ALT 1500/600s  Echo showed new organized clot adjacent ot RVOT       24 hour events :     ICU Vital Signs Last 24 Hrs  T(C): 37.6 (10-07-24 @ 12:00), Max: 37.6 (10-07-24 @ 12:00)  T(F): 99.7 (10-07-24 @ 12:00), Max: 99.7 (10-07-24 @ 12:00)  HR: 114 (10-07-24 @ 14:00) (88 - 120)  BP: 111/53 (10-07-24 @ 14:00) (103/52 - 122/59)  BP(mean): 77 (10-07-24 @ 14:00) (74 - 85)  ABP: 121/45 (10-07-24 @ 14:00) (104/48 - 151/59)  ABP(mean): 67 (10-07-24 @ 14:00) (63 - 84)  RR: 21 (10-07-24 @ 14:00) (21 - 36)  SpO2: 94% (10-07-24 @ 14:00) (94% - 100%)    I&O's Summary    06 Oct 2024 07:01  -  07 Oct 2024 07:00  --------------------------------------------------------  IN: 2649.2 mL / OUT: 3835 mL / NET: -1185.8 mL    07 Oct 2024 07:01  -  07 Oct 2024 15:09  --------------------------------------------------------  IN: 192.6 mL / OUT: 750 mL / NET: -557.4 mL    ABG - ( 07 Oct 2024 08:55 )  pH: 7.49  /  pCO2: 40    /  pO2: 105   / HCO3: 30    / Base Excess: 6.6   /  SaO2: 99.2                            8.9    25.80 )-----------( 167      ( 07 Oct 2024 14:04 )             27.3     07 Oct 2024 14:04    131    |  90     |  29     ----------------------------<  242    4.3     |  26     |  1.28     Ca    8.2        07 Oct 2024 14:04  Phos  3.2       07 Oct 2024 14:04  Mg     1.9       07 Oct 2024 14:04    TPro  6.5    /  Alb  4.0    /  TBili  1.6    /  DBili  x      /  AST  362    /  ALT  422    /  AlkPhos  109    07 Oct 2024 14:04    PT/INR - ( 06 Oct 2024 00:42 )   PT: 18.1 sec;   INR: 1.58 ratio    PTT - ( 07 Oct 2024 00:25 )  PTT:25.3 sec      MEDICATIONS  (STANDING):  albuterol/ipratropium for Nebulization 3 milliLiter(s) Nebulizer every 8 hours    aspirin enteric coated 81 milliGRAM(s) Oral daily      insulin regular Infusion 3 Unit(s)/Hr IV Continuous <Continuous>  levothyroxine 75 MICROGram(s) Oral daily  melatonin 5 milliGRAM(s) Oral at bedtime  gabapentin 100 milliGRAM(s) Oral every 8 hours    milrinone Infusion 0.2 MICROgram(s)/kG/Min IV Continuous <Continuous>    piperacillin/tazobactam IVPB.. 3.375 Gram(s) IV Intermittent every 8 hours  vancomycin  IVPB 1000 milliGRAM(s) IV Intermittent every 24 hours    polyethylene glycol 3350 17 Gram(s) Oral daily  senna 2 Tablet(s) Oral at bedtime  ascorbic acid 500 milliGRAM(s) Oral two times a day    heparin   Injectable 5000 Unit(s) SubCutaneous every 12 hours    Home Medications:  aspirin 81 mg oral tablet: orally once a day (02 Oct 2024 11:11)  atorvastatin 20 mg oral tablet: 1 tab(s) orally once a day (at bedtime) (02 Oct 2024 11:11)  diclofenac sodium 75 mg oral delayed release tablet: 1 tab(s) orally 2 times a day as needed (02 Oct 2024 11:10)  Lantus Solostar Pen 100 units/mL subcutaneous solution: 46 unit(s) subcutaneous once a day (at bedtime) (02 Oct 2024 11:10)  levothyroxine 75 mcg (0.075 mg) oral tablet: 1 tab(s) orally once a day (02 Oct 2024 11:07)  lisinopril 10 mg oral tablet: 1 tab(s) orally once a day (02 Oct 2024 11:11)    PHYSICAL EXAM:  Gen : no acute distress  Neck: No LAD, No JVD  Respiratory: decreased in the bases  Cardiovascular: S1 and S2, RRR, no M/G/R  Gastrointestinal: BS+, soft, NT/ND  Extremities: No peripheral edema  Vascular: 2+ peripheral pulses  Neurological: A/O x 3, overall weak but no deficit  Incision: clean dry/ no sign of infection        S/p Emergent CABG in setting of NSTEMI likely delayed presentation   Pericardial hematoma   leukocytosis - likely reactive     Neuro  Neuro assessment  Multimodal pain management    CVS   s/p CABG X 3, AGNIESZKA clip   EF 40-45%  Concern for RVOT compression from hematoma - currently no sign of tamponade  - repeat ECHO in next few days   Inotrope - milrinone 0.25  Rhythm sinus tachycardia / PAF     Pulm   Stable on NC     GI   GI proph/Bowel regimen       Monitor UOP  continue diuresis   Correct Electrolytes K >4.0-4.5 Mag 2.0-2.5  Transaminitis likely in setting of RV dysfunction - can likely restart Statin     Endo   A1c  Glycemic control per protocol  Thyroid  - on Synthroid     Heme   ASA  DVT proph  Acute blood loss anemia expected post operative loss    ID   Leukocytosis - continue empiric antibiotics     Critical care time spent 50   min

## 2024-10-07 NOTE — OCCUPATIONAL THERAPY INITIAL EVALUATION ADULT - GENERAL OBSERVATIONS, REHAB EVAL
Patient received semi-supine in bed, +tele, BP cuff, pulse ox, +IV, a-line, external pacemaker, danya, NC

## 2024-10-07 NOTE — OCCUPATIONAL THERAPY INITIAL EVALUATION ADULT - LIVES WITH, PROFILE
Patient lives in private home with son and daughter in law, 3 steps to enter, first floor set up. Patient ambulates with cane, has HHA 8 hours x 7 days, requires assistance with ADLS, owns a shower chair/children Patient lives in private home with son and daughter in law, 3 steps to enter, first floor set up. Patient ambulates with rolling walker, has HHA 8 hours x 7 days, requires assistance with ADLS, owns a shower chair and wheelchair/children

## 2024-10-08 DIAGNOSIS — Z95.1 PRESENCE OF AORTOCORONARY BYPASS GRAFT: ICD-10-CM

## 2024-10-08 DIAGNOSIS — E11.9 TYPE 2 DIABETES MELLITUS WITHOUT COMPLICATIONS: ICD-10-CM

## 2024-10-08 DIAGNOSIS — D72.829 ELEVATED WHITE BLOOD CELL COUNT, UNSPECIFIED: ICD-10-CM

## 2024-10-08 LAB
ALBUMIN SERPL ELPH-MCNC: 4 G/DL — SIGNIFICANT CHANGE UP (ref 3.3–5)
ALP SERPL-CCNC: 100 U/L — SIGNIFICANT CHANGE UP (ref 40–120)
ALT FLD-CCNC: 317 U/L — HIGH (ref 10–45)
ANION GAP SERPL CALC-SCNC: 12 MMOL/L — SIGNIFICANT CHANGE UP (ref 5–17)
ANION GAP SERPL CALC-SCNC: 12 MMOL/L — SIGNIFICANT CHANGE UP (ref 5–17)
AST SERPL-CCNC: 245 U/L — HIGH (ref 10–40)
BASOPHILS # BLD AUTO: 0.04 K/UL — SIGNIFICANT CHANGE UP (ref 0–0.2)
BASOPHILS NFR BLD AUTO: 0.2 % — SIGNIFICANT CHANGE UP (ref 0–2)
BILIRUB SERPL-MCNC: 1.7 MG/DL — HIGH (ref 0.2–1.2)
BLD GP AB SCN SERPL QL: NEGATIVE — SIGNIFICANT CHANGE UP
BUN SERPL-MCNC: 26 MG/DL — HIGH (ref 7–23)
BUN SERPL-MCNC: 26 MG/DL — HIGH (ref 7–23)
CALCIUM SERPL-MCNC: 8 MG/DL — LOW (ref 8.4–10.5)
CALCIUM SERPL-MCNC: 8.5 MG/DL — SIGNIFICANT CHANGE UP (ref 8.4–10.5)
CHLORIDE SERPL-SCNC: 91 MMOL/L — LOW (ref 96–108)
CHLORIDE SERPL-SCNC: 91 MMOL/L — LOW (ref 96–108)
CO2 SERPL-SCNC: 29 MMOL/L — SIGNIFICANT CHANGE UP (ref 22–31)
CO2 SERPL-SCNC: 29 MMOL/L — SIGNIFICANT CHANGE UP (ref 22–31)
CREAT SERPL-MCNC: 1.21 MG/DL — SIGNIFICANT CHANGE UP (ref 0.5–1.3)
CREAT SERPL-MCNC: 1.23 MG/DL — SIGNIFICANT CHANGE UP (ref 0.5–1.3)
CULTURE RESULTS: SIGNIFICANT CHANGE UP
CULTURE RESULTS: SIGNIFICANT CHANGE UP
EGFR: 46 ML/MIN/1.73M2 — LOW
EGFR: 47 ML/MIN/1.73M2 — LOW
EOSINOPHIL # BLD AUTO: 0.14 K/UL — SIGNIFICANT CHANGE UP (ref 0–0.5)
EOSINOPHIL NFR BLD AUTO: 0.6 % — SIGNIFICANT CHANGE UP (ref 0–6)
GAS PNL BLDA: SIGNIFICANT CHANGE UP
GAS PNL BLDA: SIGNIFICANT CHANGE UP
GLUCOSE BLDC GLUCOMTR-MCNC: 106 MG/DL — HIGH (ref 70–99)
GLUCOSE BLDC GLUCOMTR-MCNC: 111 MG/DL — HIGH (ref 70–99)
GLUCOSE BLDC GLUCOMTR-MCNC: 115 MG/DL — HIGH (ref 70–99)
GLUCOSE BLDC GLUCOMTR-MCNC: 131 MG/DL — HIGH (ref 70–99)
GLUCOSE BLDC GLUCOMTR-MCNC: 149 MG/DL — HIGH (ref 70–99)
GLUCOSE BLDC GLUCOMTR-MCNC: 155 MG/DL — HIGH (ref 70–99)
GLUCOSE BLDC GLUCOMTR-MCNC: 163 MG/DL — HIGH (ref 70–99)
GLUCOSE BLDC GLUCOMTR-MCNC: 169 MG/DL — HIGH (ref 70–99)
GLUCOSE BLDC GLUCOMTR-MCNC: 173 MG/DL — HIGH (ref 70–99)
GLUCOSE BLDC GLUCOMTR-MCNC: 182 MG/DL — HIGH (ref 70–99)
GLUCOSE BLDC GLUCOMTR-MCNC: 194 MG/DL — HIGH (ref 70–99)
GLUCOSE BLDC GLUCOMTR-MCNC: 212 MG/DL — HIGH (ref 70–99)
GLUCOSE BLDC GLUCOMTR-MCNC: 233 MG/DL — HIGH (ref 70–99)
GLUCOSE BLDC GLUCOMTR-MCNC: 93 MG/DL — SIGNIFICANT CHANGE UP (ref 70–99)
GLUCOSE SERPL-MCNC: 114 MG/DL — HIGH (ref 70–99)
GLUCOSE SERPL-MCNC: 180 MG/DL — HIGH (ref 70–99)
HCT VFR BLD CALC: 24 % — LOW (ref 34.5–45)
HGB BLD-MCNC: 7.9 G/DL — LOW (ref 11.5–15.5)
IMM GRANULOCYTES NFR BLD AUTO: 3.4 % — HIGH (ref 0–0.9)
LYMPHOCYTES # BLD AUTO: 13.9 % — SIGNIFICANT CHANGE UP (ref 13–44)
LYMPHOCYTES # BLD AUTO: 3.16 K/UL — SIGNIFICANT CHANGE UP (ref 1–3.3)
MAGNESIUM SERPL-MCNC: 2 MG/DL — SIGNIFICANT CHANGE UP (ref 1.6–2.6)
MCHC RBC-ENTMCNC: 29.4 PG — SIGNIFICANT CHANGE UP (ref 27–34)
MCHC RBC-ENTMCNC: 32.9 GM/DL — SIGNIFICANT CHANGE UP (ref 32–36)
MCV RBC AUTO: 89.2 FL — SIGNIFICANT CHANGE UP (ref 80–100)
MONOCYTES # BLD AUTO: 1.86 K/UL — HIGH (ref 0–0.9)
MONOCYTES NFR BLD AUTO: 8.2 % — SIGNIFICANT CHANGE UP (ref 2–14)
NEUTROPHILS # BLD AUTO: 16.75 K/UL — HIGH (ref 1.8–7.4)
NEUTROPHILS NFR BLD AUTO: 73.7 % — SIGNIFICANT CHANGE UP (ref 43–77)
NRBC # BLD: 0 /100 WBCS — SIGNIFICANT CHANGE UP (ref 0–0)
PHOSPHATE SERPL-MCNC: 2.8 MG/DL — SIGNIFICANT CHANGE UP (ref 2.5–4.5)
PLATELET # BLD AUTO: 149 K/UL — LOW (ref 150–400)
POTASSIUM SERPL-MCNC: 3.5 MMOL/L — SIGNIFICANT CHANGE UP (ref 3.5–5.3)
POTASSIUM SERPL-MCNC: 4.3 MMOL/L — SIGNIFICANT CHANGE UP (ref 3.5–5.3)
POTASSIUM SERPL-SCNC: 3.5 MMOL/L — SIGNIFICANT CHANGE UP (ref 3.5–5.3)
POTASSIUM SERPL-SCNC: 4.3 MMOL/L — SIGNIFICANT CHANGE UP (ref 3.5–5.3)
PROCALCITONIN SERPL-MCNC: 0.66 NG/ML — HIGH (ref 0.02–0.1)
PROT SERPL-MCNC: 6.3 G/DL — SIGNIFICANT CHANGE UP (ref 6–8.3)
RBC # BLD: 2.69 M/UL — LOW (ref 3.8–5.2)
RBC # FLD: 15.3 % — HIGH (ref 10.3–14.5)
RH IG SCN BLD-IMP: POSITIVE — SIGNIFICANT CHANGE UP
SODIUM SERPL-SCNC: 132 MMOL/L — LOW (ref 135–145)
SODIUM SERPL-SCNC: 132 MMOL/L — LOW (ref 135–145)
SPECIMEN SOURCE: SIGNIFICANT CHANGE UP
SPECIMEN SOURCE: SIGNIFICANT CHANGE UP
T4 FREE SERPL-MCNC: 1.5 NG/DL — SIGNIFICANT CHANGE UP (ref 0.9–1.8)
T4 FREE SERPL-MCNC: 1.7 NG/DL — SIGNIFICANT CHANGE UP (ref 0.9–1.8)
VANCOMYCIN TROUGH SERPL-MCNC: 12.2 UG/ML — SIGNIFICANT CHANGE UP (ref 10–20)
WBC # BLD: 22.72 K/UL — HIGH (ref 3.8–10.5)
WBC # FLD AUTO: 22.72 K/UL — HIGH (ref 3.8–10.5)

## 2024-10-08 PROCEDURE — 71045 X-RAY EXAM CHEST 1 VIEW: CPT | Mod: 26

## 2024-10-08 PROCEDURE — 99291 CRITICAL CARE FIRST HOUR: CPT | Mod: FS

## 2024-10-08 RX ORDER — AMIODARONE HYDROCHLORIDE 50 MG/ML
200 INJECTION, SOLUTION INTRAVENOUS DAILY
Refills: 0 | Status: DISCONTINUED | OUTPATIENT
Start: 2024-10-12 | End: 2024-10-14

## 2024-10-08 RX ORDER — SPIRONOLACTONE 100 MG/1
25 TABLET, FILM COATED ORAL DAILY
Refills: 0 | Status: DISCONTINUED | OUTPATIENT
Start: 2024-10-08 | End: 2024-10-14

## 2024-10-08 RX ORDER — FUROSEMIDE 10 MG/ML
40 INJECTION INTRAVENOUS ONCE
Refills: 0 | Status: COMPLETED | OUTPATIENT
Start: 2024-10-08 | End: 2024-10-08

## 2024-10-08 RX ORDER — FUROSEMIDE 10 MG/ML
40 INJECTION INTRAVENOUS EVERY 12 HOURS
Refills: 0 | Status: DISCONTINUED | OUTPATIENT
Start: 2024-10-08 | End: 2024-10-11

## 2024-10-08 RX ORDER — MAGNESIUM SULFATE 500 MG/ML
2 VIAL (ML) INJECTION ONCE
Refills: 0 | Status: COMPLETED | OUTPATIENT
Start: 2024-10-08 | End: 2024-10-08

## 2024-10-08 RX ORDER — INSULIN LISPRO 100/ML
VIAL (ML) SUBCUTANEOUS
Refills: 0 | Status: DISCONTINUED | OUTPATIENT
Start: 2024-10-08 | End: 2024-10-14

## 2024-10-08 RX ORDER — INSULIN LISPRO 100/ML
8 VIAL (ML) SUBCUTANEOUS
Refills: 0 | Status: DISCONTINUED | OUTPATIENT
Start: 2024-10-08 | End: 2024-10-08

## 2024-10-08 RX ORDER — INSULIN LISPRO 100/ML
10 VIAL (ML) SUBCUTANEOUS
Refills: 0 | Status: DISCONTINUED | OUTPATIENT
Start: 2024-10-08 | End: 2024-10-11

## 2024-10-08 RX ORDER — AMIODARONE HYDROCHLORIDE 50 MG/ML
400 INJECTION, SOLUTION INTRAVENOUS EVERY 8 HOURS
Refills: 0 | Status: COMPLETED | OUTPATIENT
Start: 2024-10-08 | End: 2024-10-11

## 2024-10-08 RX ORDER — AMIODARONE HYDROCHLORIDE 50 MG/ML
INJECTION, SOLUTION INTRAVENOUS
Refills: 0 | Status: DISCONTINUED | OUTPATIENT
Start: 2024-10-08 | End: 2024-10-14

## 2024-10-08 RX ORDER — INSULIN GLARGINE 300 U/ML
20 INJECTION, SOLUTION SUBCUTANEOUS AT BEDTIME
Refills: 0 | Status: DISCONTINUED | OUTPATIENT
Start: 2024-10-08 | End: 2024-10-09

## 2024-10-08 RX ORDER — INSULIN GLARGINE 300 U/ML
30 INJECTION, SOLUTION SUBCUTANEOUS EVERY MORNING
Refills: 0 | Status: DISCONTINUED | OUTPATIENT
Start: 2024-10-08 | End: 2024-10-08

## 2024-10-08 RX ORDER — INSULIN LISPRO 100/ML
5 VIAL (ML) SUBCUTANEOUS
Refills: 0 | Status: DISCONTINUED | OUTPATIENT
Start: 2024-10-08 | End: 2024-10-08

## 2024-10-08 RX ORDER — INSULIN LISPRO 100/ML
VIAL (ML) SUBCUTANEOUS AT BEDTIME
Refills: 0 | Status: DISCONTINUED | OUTPATIENT
Start: 2024-10-08 | End: 2024-10-14

## 2024-10-08 RX ADMIN — Medication 250 MICROGRAM(S): at 05:04

## 2024-10-08 RX ADMIN — Medication 10 UNIT(S): at 17:30

## 2024-10-08 RX ADMIN — IPRATROPIUM BROMIDE AND ALBUTEROL SULFATE 3 MILLILITER(S): .5; 3 SOLUTION RESPIRATORY (INHALATION) at 05:20

## 2024-10-08 RX ADMIN — Medication 5000 UNIT(S): at 17:07

## 2024-10-08 RX ADMIN — Medication 2 TABLET(S): at 21:49

## 2024-10-08 RX ADMIN — PIPERACILLIN SODIUM AND TAZOBACTAM SODIUM 25 GRAM(S): 12; 1.5 INJECTION, POWDER, LYOPHILIZED, FOR SOLUTION INTRAVENOUS at 21:50

## 2024-10-08 RX ADMIN — IPRATROPIUM BROMIDE AND ALBUTEROL SULFATE 3 MILLILITER(S): .5; 3 SOLUTION RESPIRATORY (INHALATION) at 21:50

## 2024-10-08 RX ADMIN — Medication 5000 UNIT(S): at 05:04

## 2024-10-08 RX ADMIN — CHLORHEXIDINE GLUCONATE ORAL RINSE 1 APPLICATION(S): 1.2 SOLUTION DENTAL at 11:32

## 2024-10-08 RX ADMIN — Medication 500 MILLIGRAM(S): at 17:11

## 2024-10-08 RX ADMIN — Medication 4: at 12:30

## 2024-10-08 RX ADMIN — Medication 17 GRAM(S): at 11:19

## 2024-10-08 RX ADMIN — Medication 2.98 MICROGRAM(S)/KG/MIN: at 12:54

## 2024-10-08 RX ADMIN — GABAPENTIN 100 MILLIGRAM(S): 800 TABLET, FILM COATED ORAL at 13:02

## 2024-10-08 RX ADMIN — Medication 10 MILLILITER(S): at 07:15

## 2024-10-08 RX ADMIN — FUROSEMIDE 40 MILLIGRAM(S): 10 INJECTION INTRAVENOUS at 17:09

## 2024-10-08 RX ADMIN — Medication 2: at 17:29

## 2024-10-08 RX ADMIN — Medication 500 MILLIGRAM(S): at 05:04

## 2024-10-08 RX ADMIN — Medication 25 GRAM(S): at 02:41

## 2024-10-08 RX ADMIN — AMIODARONE HYDROCHLORIDE 400 MILLIGRAM(S): 50 INJECTION, SOLUTION INTRAVENOUS at 13:02

## 2024-10-08 RX ADMIN — Medication 8 UNIT(S): at 12:29

## 2024-10-08 RX ADMIN — FUROSEMIDE 40 MILLIGRAM(S): 10 INJECTION INTRAVENOUS at 11:19

## 2024-10-08 RX ADMIN — Medication 81 MILLIGRAM(S): at 11:19

## 2024-10-08 RX ADMIN — GABAPENTIN 100 MILLIGRAM(S): 800 TABLET, FILM COATED ORAL at 05:03

## 2024-10-08 RX ADMIN — Medication 200 GRAM(S): at 02:41

## 2024-10-08 RX ADMIN — AMIODARONE HYDROCHLORIDE 400 MILLIGRAM(S): 50 INJECTION, SOLUTION INTRAVENOUS at 06:24

## 2024-10-08 RX ADMIN — PIPERACILLIN SODIUM AND TAZOBACTAM SODIUM 25 GRAM(S): 12; 1.5 INJECTION, POWDER, LYOPHILIZED, FOR SOLUTION INTRAVENOUS at 05:03

## 2024-10-08 RX ADMIN — SPIRONOLACTONE 25 MILLIGRAM(S): 100 TABLET, FILM COATED ORAL at 17:35

## 2024-10-08 RX ADMIN — INSULIN GLARGINE 20 UNIT(S): 300 INJECTION, SOLUTION SUBCUTANEOUS at 21:50

## 2024-10-08 RX ADMIN — IPRATROPIUM BROMIDE AND ALBUTEROL SULFATE 3 MILLILITER(S): .5; 3 SOLUTION RESPIRATORY (INHALATION) at 11:17

## 2024-10-08 RX ADMIN — Medication 5 MILLIGRAM(S): at 21:49

## 2024-10-08 RX ADMIN — AMIODARONE HYDROCHLORIDE 400 MILLIGRAM(S): 50 INJECTION, SOLUTION INTRAVENOUS at 21:49

## 2024-10-08 RX ADMIN — Medication 3 UNIT(S)/HR: at 07:14

## 2024-10-08 RX ADMIN — Medication 20 MILLIEQUIVALENT(S): at 05:03

## 2024-10-08 RX ADMIN — INSULIN GLARGINE 30 UNIT(S): 300 INJECTION, SOLUTION SUBCUTANEOUS at 08:43

## 2024-10-08 RX ADMIN — VANCOMYCIN HCL-SODIUM CHLORIDE IV SOLN 1.5 GM/250ML-0.9% 250 MILLIGRAM(S): 1.5-0.9/25 SOLUTION at 08:00

## 2024-10-08 RX ADMIN — Medication 75 MICROGRAM(S): at 05:03

## 2024-10-08 RX ADMIN — FUROSEMIDE 40 MILLIGRAM(S): 10 INJECTION INTRAVENOUS at 13:56

## 2024-10-08 RX ADMIN — Medication 20 MILLIEQUIVALENT(S): at 02:42

## 2024-10-08 RX ADMIN — PIPERACILLIN SODIUM AND TAZOBACTAM SODIUM 25 GRAM(S): 12; 1.5 INJECTION, POWDER, LYOPHILIZED, FOR SOLUTION INTRAVENOUS at 13:02

## 2024-10-08 NOTE — PROGRESS NOTE ADULT - SUBJECTIVE AND OBJECTIVE BOX
VITAL SIGNS    Telemetry:      Vital Signs Last 24 Hrs  T(C): 36.9 (10-08-24 @ 12:00), Max: 37.3 (10-07-24 @ 16:00)  T(F): 98.4 (10-08-24 @ 12:00), Max: 99.1 (10-07-24 @ 16:00)  HR: 101 (10-08-24 @ 14:00) (90 - 105)  BP: 101/54 (10-08-24 @ 04:00) (97/55 - 122/56)  RR: 25 (10-08-24 @ 14:00) (12 - 28)  SpO2: 99% (10-08-24 @ 14:00) (95% - 100%)                   10-07 @ 07:01  -  10-08 @ 07:00  --------------------------------------------------------  IN: 1135.2 mL / OUT: 2335 mL / NET: -1199.8 mL    10-08 @ 07:01  -  10-08 @ 15:36  --------------------------------------------------------  IN: 878.4 mL / OUT: 675 mL / NET: 203.4 mL          Daily     Daily Weight in k.1 (08 Oct 2024 00:00)            CAPILLARY BLOOD GLUCOSE  212 (08 Oct 2024 12:00)  149 (08 Oct 2024 11:00)  182 (08 Oct 2024 10:00)  233 (08 Oct 2024 09:00)  194 (08 Oct 2024 08:00)  163 (08 Oct 2024 07:00)  151 (08 Oct 2024 06:00)  131 (08 Oct 2024 05:00)  111 (08 Oct 2024 04:00)  93 (08 Oct 2024 03:00)  106 (08 Oct 2024 02:00)  115 (08 Oct 2024 01:00)  105 (08 Oct 2024 00:00)  118 (07 Oct 2024 23:00)  121 (07 Oct 2024 22:00)  122 (07 Oct 2024 21:00)  115 (07 Oct 2024 20:00)  133 (07 Oct 2024 19:00)  163 (07 Oct 2024 18:00)  184 (07 Oct 2024 17:00)  143 (07 Oct 2024 16:00)      POCT Blood Glucose.: 212 mg/dL (08 Oct 2024 12:27)  POCT Blood Glucose.: 149 mg/dL (08 Oct 2024 10:55)  POCT Blood Glucose.: 182 mg/dL (08 Oct 2024 10:06)  POCT Blood Glucose.: 233 mg/dL (08 Oct 2024 08:46)  POCT Blood Glucose.: 194 mg/dL (08 Oct 2024 08:09)  POCT Blood Glucose.: 163 mg/dL (08 Oct 2024 06:45)  POCT Blood Glucose.: 155 mg/dL (08 Oct 2024 06:18)  POCT Blood Glucose.: 131 mg/dL (08 Oct 2024 04:54)  POCT Blood Glucose.: 111 mg/dL (08 Oct 2024 04:01)  POCT Blood Glucose.: 93 mg/dL (08 Oct 2024 02:50)  POCT Blood Glucose.: 106 mg/dL (08 Oct 2024 01:46)  POCT Blood Glucose.: 115 mg/dL (08 Oct 2024 01:12)  POCT Blood Glucose.: 118 mg/dL (07 Oct 2024 22:44)  POCT Blood Glucose.: 121 mg/dL (07 Oct 2024 21:46)  POCT Blood Glucose.: 122 mg/dL (07 Oct 2024 21:23)  POCT Blood Glucose.: 115 mg/dL (07 Oct 2024 19:51)  POCT Blood Glucose.: 133 mg/dL (07 Oct 2024 18:49)  POCT Blood Glucose.: 163 mg/dL (07 Oct 2024 18:07)  POCT Blood Glucose.: 184 mg/dL (07 Oct 2024 17:11)  POCT Blood Glucose.: 143 mg/dL (07 Oct 2024 16:05)            Drains:     MS         [  ] Drainage:                 L Pleural  [  ]  Drainage:                R Pleural  [  ]  Drainage:    Pacing Wires        [  ]   Settings:                                  Isolated  [  ]    Coumadin    [ ] YES          [  ]      NO                                   PHYSICAL EXAM        Neurology: alert and oriented x 3, nonfocal, no gross deficits  CV : s1 s2 RRR  Sternal Wound :  CDI , Stable  Lungs: cta  Abdomen: soft, nontender, nondistended, positive bowel sounds, last bowel movement                       chest tubes  :    voiding / hagan - sbd         Extremities:      edema   /  -   calve tenderness ,    L leg  /  R leg  incisions cdi          albuterol/ipratropium for Nebulization 3 milliLiter(s) Nebulizer every 8 hours  aMIOdarone    Tablet 400 milliGRAM(s) Oral every 8 hours  aMIOdarone    Tablet   Oral   ascorbic acid 500 milliGRAM(s) Oral two times a day  aspirin enteric coated 81 milliGRAM(s) Oral daily  bisacodyl Suppository 10 milliGRAM(s) Rectal once  chlorhexidine 2% Cloths 1 Application(s) Topical daily  dextrose 50% Injectable 50 milliLiter(s) IV Push every 15 minutes  furosemide   Injectable 40 milliGRAM(s) IV Push every 12 hours  heparin   Injectable 5000 Unit(s) SubCutaneous every 12 hours  insulin glargine Injectable (LANTUS) 20 Unit(s) SubCutaneous at bedtime  insulin lispro (ADMELOG) corrective regimen sliding scale   SubCutaneous three times a day before meals  insulin lispro (ADMELOG) corrective regimen sliding scale   SubCutaneous at bedtime  insulin lispro Injectable (ADMELOG) 10 Unit(s) SubCutaneous three times a day before meals  levothyroxine 75 MICROGram(s) Oral daily  melatonin 5 milliGRAM(s) Oral at bedtime  milrinone Infusion 0.125 MICROgram(s)/kG/Min IV Continuous <Continuous>  oxyCODONE    IR 5 milliGRAM(s) Oral every 4 hours PRN  oxyCODONE    IR 10 milliGRAM(s) Oral every 4 hours PRN  piperacillin/tazobactam IVPB.. 3.375 Gram(s) IV Intermittent every 8 hours  polyethylene glycol 3350 17 Gram(s) Oral daily  senna 2 Tablet(s) Oral at bedtime  sodium chloride 0.9%. 1000 milliLiter(s) IV Continuous <Continuous>                    Physical Therapy Rec:   Home  [  ]   Home w/ PT  [  ]  Rehab  [  ]  Discussed with Cardiothoracic Team at AM rounds.     VITAL SIGNS    Telemetry:  82 NSR    Vital Signs Last 24 Hrs  T(C): 36.9 (10-08-24 @ 12:00), Max: 37.3 (10-07-24 @ 16:00)  T(F): 98.4 (10-08-24 @ 12:00), Max: 99.1 (10-07-24 @ 16:00)  HR: 101 (10-08-24 @ 14:00) (90 - 105)  BP: 101/54 (10-08-24 @ 04:00) (97/55 - 122/56)  RR: 25 (10-08-24 @ 14:00) (12 - 28)  SpO2: 99% (10-08-24 @ 14:00) (95% - 100%)                   10-07 @ 07:01  -  10-08 @ 07:00  --------------------------------------------------------  IN: 1135.2 mL / OUT: 2335 mL / NET: -1199.8 mL    10-08 @ 07:01  -  10-08 @ 15:36  --------------------------------------------------------  IN: 878.4 mL / OUT: 675 mL / NET: 203.4 mL          Daily     Daily Weight in k.1 (08 Oct 2024 00:00)            CAPILLARY BLOOD GLUCOSE  212 (08 Oct 2024 12:00)  149 (08 Oct 2024 11:00)  182 (08 Oct 2024 10:00)  233 (08 Oct 2024 09:00)  194 (08 Oct 2024 08:00)  163 (08 Oct 2024 07:00)  151 (08 Oct 2024 06:00)  131 (08 Oct 2024 05:00)  111 (08 Oct 2024 04:00)  93 (08 Oct 2024 03:00)  106 (08 Oct 2024 02:00)  115 (08 Oct 2024 01:00)  105 (08 Oct 2024 00:00)  118 (07 Oct 2024 23:00)  121 (07 Oct 2024 22:00)  122 (07 Oct 2024 21:00)  115 (07 Oct 2024 20:00)  133 (07 Oct 2024 19:00)  163 (07 Oct 2024 18:00)  184 (07 Oct 2024 17:00)  143 (07 Oct 2024 16:00)      POCT Blood Glucose.: 212 mg/dL (08 Oct 2024 12:27)  POCT Blood Glucose.: 149 mg/dL (08 Oct 2024 10:55)  POCT Blood Glucose.: 182 mg/dL (08 Oct 2024 10:06)  POCT Blood Glucose.: 233 mg/dL (08 Oct 2024 08:46)  POCT Blood Glucose.: 194 mg/dL (08 Oct 2024 08:09)  POCT Blood Glucose.: 163 mg/dL (08 Oct 2024 06:45)  POCT Blood Glucose.: 155 mg/dL (08 Oct 2024 06:18)  POCT Blood Glucose.: 131 mg/dL (08 Oct 2024 04:54)  POCT Blood Glucose.: 111 mg/dL (08 Oct 2024 04:01)  POCT Blood Glucose.: 93 mg/dL (08 Oct 2024 02:50)  POCT Blood Glucose.: 106 mg/dL (08 Oct 2024 01:46)  POCT Blood Glucose.: 115 mg/dL (08 Oct 2024 01:12)  POCT Blood Glucose.: 118 mg/dL (07 Oct 2024 22:44)  POCT Blood Glucose.: 121 mg/dL (07 Oct 2024 21:46)  POCT Blood Glucose.: 122 mg/dL (07 Oct 2024 21:23)  POCT Blood Glucose.: 115 mg/dL (07 Oct 2024 19:51)  POCT Blood Glucose.: 133 mg/dL (07 Oct 2024 18:49)  POCT Blood Glucose.: 163 mg/dL (07 Oct 2024 18:07)  POCT Blood Glucose.: 184 mg/dL (07 Oct 2024 17:11)  POCT Blood Glucose.: 143 mg/dL (07 Oct 2024 16:05)                Pacing Wires        [x  ]   Settings:             box                     Isolated  [  ]    Coumadin    [ ] YES          [X  ]      NO                                   PHYSICAL EXAM        Neurology: alert and oriented x 3, nonfocal, no gross deficits  CV : s1 s2 RRR  Sternal Wound :  CDI , Stable  Lungs: UPPER RHONCHI    Abdomen: soft, nontender, nondistended, positive bowel sounds                        :    hagan - sbd         Extremities:    +  edema   /  -   calve tenderness ,    L leg  /  R leg  incisions cdi          albuterol/ipratropium for Nebulization 3 milliLiter(s) Nebulizer every 8 hours  aMIOdarone    Tablet 400 milliGRAM(s) Oral every 8 hours  aMIOdarone    Tablet   Oral   ascorbic acid 500 milliGRAM(s) Oral two times a day  aspirin enteric coated 81 milliGRAM(s) Oral daily  bisacodyl Suppository 10 milliGRAM(s) Rectal once  chlorhexidine 2% Cloths 1 Application(s) Topical daily  dextrose 50% Injectable 50 milliLiter(s) IV Push every 15 minutes  furosemide   Injectable 40 milliGRAM(s) IV Push every 12 hours  heparin   Injectable 5000 Unit(s) SubCutaneous every 12 hours  insulin glargine Injectable (LANTUS) 20 Unit(s) SubCutaneous at bedtime  insulin lispro (ADMELOG) corrective regimen sliding scale   SubCutaneous three times a day before meals  insulin lispro (ADMELOG) corrective regimen sliding scale   SubCutaneous at bedtime  insulin lispro Injectable (ADMELOG) 10 Unit(s) SubCutaneous three times a day before meals  levothyroxine 75 MICROGram(s) Oral daily  melatonin 5 milliGRAM(s) Oral at bedtime  milrinone Infusion 0.125 MICROgram(s)/kG/Min IV Continuous <Continuous>  oxyCODONE    IR 5 milliGRAM(s) Oral every 4 hours PRN  oxyCODONE    IR 10 milliGRAM(s) Oral every 4 hours PRN  piperacillin/tazobactam IVPB.. 3.375 Gram(s) IV Intermittent every 8 hours  polyethylene glycol 3350 17 Gram(s) Oral daily  senna 2 Tablet(s) Oral at bedtime  sodium chloride 0.9%. 1000 milliLiter(s) IV Continuous <Continuous>                    Physical Therapy Rec:   Home  [  ]   Home w/ PT  [  ]  Rehab  [  ]  Discussed with Cardiothoracic Team at AM rounds.

## 2024-10-08 NOTE — PROGRESS NOTE ADULT - PROBLEM SELECTOR PLAN 1
Asa, Statin  on hold for lft's,   B-blocker when weaned off inotropes., primacor at 0.125mcg/kg/min  Chest PT,  Incentive spirometry, wound care and assessment.  Ambulate .

## 2024-10-08 NOTE — PROGRESS NOTE ADULT - ASSESSMENT
75 y/o female w/ PMH of hypothyroidism, CVA x2 (2018), HTN, HLD and DM who initially p/t [place] w/ worsening back pain, SOB, N/V and CP. She was transferred as a Tier I w/ c/f NSTEMI and hypotension (SBP 80s). Her son was at bedside and says she it typically mobile with her walker and has never had such back pain or CP. ECG with ST depressions I, II, aVL, V2-V5 and ST elevations aVR, III, V1. She was ASA and brilinta and heparin loaded at OSH. Pt. underwent R/LHC found to have severe LM and LAD disease and moderate pRCA disease. IABP placed for coronary perfusion and admitted to CICU for CABG eval. On arrival to unit pt. endorses nausea and had one episode of emesis relieved w/ antiemetics. (01 Oct 2024 03:06)  10/3/24 CABG, with SAMUEL , LIMA to LAD, SVG to OM,SVG to Diag  AGNIESZKA Clip   ef 45 75 y/o female w/ PMH of hypothyroidism, CVA x2 (2018), HTN, HLD and DM who initially p/t [place] w/ worsening back pain, SOB, N/V and CP. She was transferred as a Tier I w/ c/f NSTEMI and hypotension (SBP 80s). Her son was at bedside and says she it typically mobile with her walker and has never had such back pain or CP. ECG with ST depressions I, II, aVL, V2-V5 and ST elevations aVR, III, V1. She was ASA and brilinta and heparin loaded at OSH. Pt. underwent R/LHC found to have severe LM and LAD disease and moderate pRCA disease. IABP placed for coronary perfusion and admitted to CICU for CABG eval. On arrival to unit pt. endorses nausea and had one episode of emesis relieved w/ antiemetics. (01 Oct 2024 03:06)  10/3/24 CABG, with SAMUEL , LIMA to LAD, SVG to OM,SVG to Diag  AGNIESZKA Clip   , IABP ef 45  Labile hemodynamics  volume resuscitation  pressor + inotrope  Extubated d 1   wean , decrease in MVO2, increasd  PAF, amio load  Insulin infusion 75 y/o female w/ PMH of hypothyroidism, CVA x2 (2018), HTN, HLD and DM who initially p/t [place] w/ worsening back pain, SOB, N/V and CP. She was transferred as a Tier I w/ c/f NSTEMI and hypotension (SBP 80s). Her son was at bedside and says she it typically mobile with her walker and has never had such back pain or CP. ECG with ST depressions I, II, aVL, V2-V5 and ST elevations aVR, III, V1. She was ASA and brilinta and heparin loaded at OSH. Pt. underwent R/LHC found to have severe LM and LAD disease and moderate pRCA disease. IABP placed for coronary perfusion and admitted to CICU for CABG eval. On arrival to unit pt. endorses nausea and had one episode of emesis relieved w/ antiemetics. (01 Oct 2024 03:06)  10/3/24 CABG, with SAMUEL , LIMA to LAD, SVG to OM,SVG to Diag  AGNIESZKA Clip   , IABP ef 45  Labile hemodynamics  volume resuscitation  pressor + inotrope  Extubated d 1, iabp d/c   wean , decrease in MVO2, increasd  PAF, amio load  Insulin infusion  10/5  off since 11 AM 10/5- inotrope weaned - later noted to have elevated Lactate, rising LFTs, milrinone restarted, also concern for pulm congestion - bumex drip started    , LFTs in 1000's  Started on milrinone , Bumex gtt and  Amio  d/tameka.  statin on hold for lfts   TTE organized clot adjacent RVOT  Zosyn/vanco for leukocytosis, cultures neg  10/8  Transferred sdu

## 2024-10-08 NOTE — PROGRESS NOTE ADULT - SUBJECTIVE AND OBJECTIVE BOX
POD # 5  10/3 CABG X  3 AGNIESZKA clip     Brief history : 75 yo with multiple medical problems who presented to ED on 10/1 with chest and back pain,  EKG reportedly showed diffuse ST depression, + trop ruled in for NSTEMI.  ASA/brillinta loaded and transferred to [facility] ED for Emergent LHC. During transfer and on arrival had some hemodynamic instability and per notes pt endorsed having CP since 9/27    10/1 LHC Severe multivessel CAD including 99% LM , s/p IABP insertion   10/1 Echo EF nl , mod MR    PMH: CVA x 2, hypothyroidism HTN, Asthma     10/3 OR CABG X 3 AGNIESZKA clip SAMUEL -45% mod MR   10/4 IABP removed  10/5- inotrope weaned - later noted to have elevated Lactate, rising LFTs, milrinone restarted, also concern for pulm congestion - bumex drip started   10/6 LFTs peaked AST/ALT 1500/600s  Echo showed new organized clot adjacent ot RVOT      24 hour events :       ICU Vital Signs Last 24 Hrs  T(C): 36.9 (10-08-24 @ 08:00), Max: 37.6 (10-07-24 @ 12:00)  T(F): 98.4 (10-08-24 @ 08:00), Max: 99.7 (10-07-24 @ 12:00)  HR: 104 (10-08-24 @ 09:00) (88 - 114)  BP: 101/54 (10-08-24 @ 04:00) (97/55 - 122/59)  BP(mean): 74 (10-08-24 @ 04:00) (71 - 85)  ABP: 132/41 (10-08-24 @ 09:00) (121/45 - 149/53)  ABP(mean): 66 (10-08-24 @ 09:00) (61 - 80)  RR: 24 (10-08-24 @ 09:00) (12 - 28)  SpO2: 100% (10-08-24 @ 09:00) (94% - 100%)    I&O's Summary    07 Oct 2024 07:01  -  08 Oct 2024 07:00  --------------------------------------------------------  IN: 1135.2 mL / OUT: 2335 mL / NET: -1199.8 mL    08 Oct 2024 07:01  -  08 Oct 2024 10:42  --------------------------------------------------------  IN: 330.6 mL / OUT: 150 mL / NET: 180.6 mL            ABG - ( 08 Oct 2024 00:05 )  pH: 7.47  /  pCO2: 45    /  pO2: 138   / HCO3: 33    / Base Excess: 8.3   /  SaO2: 100.0                                   7.9    22.72 )-----------( 149      ( 08 Oct 2024 00:31 )             24.0     08 Oct 2024 00:31    132    |  91     |  26     ----------------------------<  114    3.5     |  29     |  1.23     Ca    8.0        08 Oct 2024 00:31  Phos  2.8       08 Oct 2024 00:31  Mg     2.0       08 Oct 2024 00:31    TPro  6.3    /  Alb  4.0    /  TBili  1.7    /  DBili  x      /  AST  245    /  ALT  317    /  AlkPhos  100    08 Oct 2024 00:31    PTT - ( 07 Oct 2024 00:25 )  PTT:25.3 sec    Culture - Blood (collected 06 Oct 2024 13:50)  Source: .Blood BLOOD  Preliminary Report (07 Oct 2024 19:01):    No growth at 24 hours    Culture - Blood (collected 06 Oct 2024 13:40)  Source: .Blood BLOOD  Preliminary Report (07 Oct 2024 19:01):    No growth at 24 hours        MEDICATIONS  (STANDING):  albuterol/ipratropium for Nebulization 3 milliLiter(s) Nebulizer every 8 hours    aspirin enteric coated 81 milliGRAM(s) Oral daily    insulin glargine Injectable (LANTUS) 20 Unit(s) SubCutaneous at bedtime  insulin lispro (ADMELOG) corrective regimen sliding scale   SubCutaneous three times a day before meals  insulin lispro (ADMELOG) corrective regimen sliding scale   SubCutaneous at bedtime  insulin lispro Injectable (ADMELOG) 8 Unit(s) SubCutaneous three times a day before meals  levothyroxine 75 MICROGram(s) Oral daily  dextrose 50% Injectable 50 milliLiter(s) IV Push every 15 minutes    melatonin 5 milliGRAM(s) Oral at bedtime  gabapentin 100 milliGRAM(s) Oral every 8 hours    aMIOdarone    Tablet 400 milliGRAM(s) Oral every 8 hours  aMIOdarone    Tablet   Oral   digoxin     Tablet 250 MICROGram(s) Oral daily    milrinone Infusion 0.2 MICROgram(s)/kG/Min IV Continuous <Continuous>    piperacillin/tazobactam IVPB.. 3.375 Gram(s) IV Intermittent every 8 hours  vancomycin  IVPB 1000 milliGRAM(s) IV Intermittent every 24 hours  chlorhexidine 2% Cloths 1 Application(s) Topical daily    ascorbic acid 500 milliGRAM(s) Oral two times a day  polyethylene glycol 3350 17 Gram(s) Oral daily  senna 2 Tablet(s) Oral at bedtime  bisacodyl Suppository 10 milliGRAM(s) Rectal once    heparin   Injectable 5000 Unit(s) SubCutaneous every 12 hours    furosemide   Injectable 40 milliGRAM(s) IV Push every 12 hours    sodium chloride 0.9%. 1000 milliLiter(s) IV Continuous <Continuous>      Home Medications:  aspirin 81 mg oral tablet: orally once a day (02 Oct 2024 11:11)  atorvastatin 20 mg oral tablet: 1 tab(s) orally once a day (at bedtime) (02 Oct 2024 11:11)  diclofenac sodium 75 mg oral delayed release tablet: 1 tab(s) orally 2 times a day as needed (02 Oct 2024 11:10)  Lantus Solostar Pen 100 units/mL subcutaneous solution: 46 unit(s) subcutaneous once a day (at bedtime) (02 Oct 2024 11:10)  levothyroxine 75 mcg (0.075 mg) oral tablet: 1 tab(s) orally once a day (02 Oct 2024 11:07)  lisinopril 10 mg oral tablet: 1 tab(s) orally once a day (02 Oct 2024 11:11)    PHYSICAL EXAM:  Gen : no acute distress  Neck: No LAD, No JVD  Respiratory: decreased in the bases  Cardiovascular: S1 and S2, RRR, no M/G/R  Gastrointestinal: BS+, soft, NT/ND  Extremities: No peripheral edema  Vascular: 2+ peripheral pulses  Neurological: A/O x 3, overall weak but no deficit  Incision: clean dry/ no sign of infection        S/p Emergent CABG in setting of NSTEMI likely delayed presentation   Pericardial hematoma   leukocytosis - likely reactive     Neuro  Neuro assessment  Multimodal pain management    CVS   s/p CABG X 3, AGNIESZKA clip   EF 40-45%  Concern for RVOT compression from hematoma - currently no sign of tamponade  - repeat ECHO in next few days   Inotrope - milrinone 0.2  Rhythm sinus tachycardia / PAF     Pulm   Stable on NC     GI   GI proph/Bowel regimen       Monitor UOP  continue diuresis   Correct Electrolytes K >4.0-4.5 Mag 2.0-2.5  Transaminitis likely in setting of RV dysfunction - can likely restart Statin     Endo   A1c 6.8  Glycemic control per protocol  Thyroid  - on Synthroid     Heme   ASA  DVT proph  Acute blood loss anemia expected post operative loss    ID   Leukocytosis - continue empiric antibiotics         Critical care time spent    min       Care plan discussed with the ICU care team.   Patient remains critical, at risk for life threatening decompensation.    I have spent 30 minutes providing critical care management to this patient.    By signing my name below, I, Omkar Diaz, attest that this documentation has been prepared under the direction and in the presence of Chriss Vasques MD.   Electronically signed: Omkar Diaz, 10-08-24 @ 10:42    I, Chriss Vasques, personally performed the services described in this documentation. all medical record entries made by the scribe were at my direction and in my presence. I have reviewed the chart and agree that the record reflects my personal performance and is accurate and complete  Electronically signed: Chriss Vasques MD.

## 2024-10-08 NOTE — CONSULT NOTE ADULT - SUBJECTIVE AND OBJECTIVE BOX
HPI:  73 y/o female w/ PMH of hypothyroidism, CVA x2 (2018), HTN, HLD and DM who initially p/t [place] w/ worsening back pain, SOB, N/V and CP. She was transferred as a Tier I w/ c/f NSTEMI and hypotension (SBP 80s). Her son was at bedside and says she it typically mobile with her walker and has never had such back pain or CP. ECG with ST depressions I, II, aVL, V2-V5 and ST elevations aVR, III, V1. She was ASA and brilinta and heparin loaded at OSH. Pt. underwent R/LHC found to have severe LM and LAD disease and moderate pRCA disease. IABP placed for coronary perfusion and admitted to CICU for CABG eval. On arrival to unit pt. endorses nausea and had one episode of emesis relieved w/ antiemetics. (01 Oct 2024 03:06)      Patient has history of diabetes, A1C  6.8 % on home insulins   Endo was consulted for glycemic control.      PAST MEDICAL & SURGICAL HISTORY:  Hypothyroidism      Hypertension      Hyperlipidemia      Stroke      Diabetes      S/P cholecystectomy          FAMILY HISTORY:      Social History:            HOME MEDICATIONS:  Home Medications:  aspirin 81 mg oral tablet: orally once a day (02 Oct 2024 11:11)  atorvastatin 20 mg oral tablet: 1 tab(s) orally once a day (at bedtime) (02 Oct 2024 11:11)  diclofenac sodium 75 mg oral delayed release tablet: 1 tab(s) orally 2 times a day as needed (02 Oct 2024 11:10)  Lantus Solostar Pen 100 units/mL subcutaneous solution: 46 unit(s) subcutaneous once a day (at bedtime) (02 Oct 2024 11:10)  levothyroxine 75 mcg (0.075 mg) oral tablet: 1 tab(s) orally once a day (02 Oct 2024 11:07)  lisinopril 10 mg oral tablet: 1 tab(s) orally once a day (02 Oct 2024 11:11)            MEDICATIONS  (STANDING):  albuterol/ipratropium for Nebulization 3 milliLiter(s) Nebulizer every 8 hours  aMIOdarone    Tablet 400 milliGRAM(s) Oral every 8 hours  aMIOdarone    Tablet   Oral   ascorbic acid 500 milliGRAM(s) Oral two times a day  aspirin enteric coated 81 milliGRAM(s) Oral daily  bisacodyl Suppository 10 milliGRAM(s) Rectal once  chlorhexidine 2% Cloths 1 Application(s) Topical daily  dextrose 50% Injectable 50 milliLiter(s) IV Push every 15 minutes  furosemide   Injectable 40 milliGRAM(s) IV Push every 12 hours  gabapentin 100 milliGRAM(s) Oral every 8 hours  heparin   Injectable 5000 Unit(s) SubCutaneous every 12 hours  insulin glargine Injectable (LANTUS) 20 Unit(s) SubCutaneous at bedtime  insulin lispro (ADMELOG) corrective regimen sliding scale   SubCutaneous three times a day before meals  insulin lispro (ADMELOG) corrective regimen sliding scale   SubCutaneous at bedtime  insulin lispro Injectable (ADMELOG) 8 Unit(s) SubCutaneous three times a day before meals  levothyroxine 75 MICROGram(s) Oral daily  melatonin 5 milliGRAM(s) Oral at bedtime  milrinone Infusion 0.125 MICROgram(s)/kG/Min (2.98 mL/Hr) IV Continuous <Continuous>  piperacillin/tazobactam IVPB.. 3.375 Gram(s) IV Intermittent every 8 hours  polyethylene glycol 3350 17 Gram(s) Oral daily  senna 2 Tablet(s) Oral at bedtime  sodium chloride 0.9%. 1000 milliLiter(s) (10 mL/Hr) IV Continuous <Continuous>  vancomycin  IVPB 1000 milliGRAM(s) IV Intermittent every 24 hours    MEDICATIONS  (PRN):  oxyCODONE    IR 5 milliGRAM(s) Oral every 4 hours PRN Moderate Pain (4 - 6)  oxyCODONE    IR 10 milliGRAM(s) Oral every 4 hours PRN Severe Pain (7 - 10)      Allergies    No Known Allergies    Intolerances        Review of Systems:  Neuro: No HA, no dizziness  Cardiovascular: No chest pain, no palpitations  Respiratory: no SOB, no cough  GI: No nausea, vomiting, abdominal pain  MSK: Denies joint/muscle pain      ALL OTHER SYSTEMS REVIEWED AND NEGATIVE      PHYSICAL EXAM:  VITALS: T(C): 36.9 (10-08-24 @ 08:00)  T(F): 98.4 (10-08-24 @ 08:00), Max: 99.1 (10-07-24 @ 16:00)  HR: 93 (10-08-24 @ 11:21) (90 - 114)  BP: 101/54 (10-08-24 @ 04:00) (97/55 - 122/59)  RR:  (12 - 28)  SpO2:  (94% - 100%)  Wt(kg): --  GENERAL: NAD, well-groomed, well-developed  NEURO:  alert and oriented  RESPIRATORY: Clear to auscultation bilaterally; No rales, rhonchi, wheezing  CARDIOVASCULAR: Si S2  GI: Soft, non distended, normal bowel sounds  MUSCULOSKELETAL: Moves all extremities equally       POCT Blood Glucose.: 149 mg/dL (10-08-24 @ 10:55)  POCT Blood Glucose.: 182 mg/dL (10-08-24 @ 10:06)  POCT Blood Glucose.: 233 mg/dL (10-08-24 @ 08:46)  POCT Blood Glucose.: 194 mg/dL (10-08-24 @ 08:09)  POCT Blood Glucose.: 163 mg/dL (10-08-24 @ 06:45)  POCT Blood Glucose.: 155 mg/dL (10-08-24 @ 06:18)  POCT Blood Glucose.: 131 mg/dL (10-08-24 @ 04:54)  POCT Blood Glucose.: 111 mg/dL (10-08-24 @ 04:01)  POCT Blood Glucose.: 93 mg/dL (10-08-24 @ 02:50)  POCT Blood Glucose.: 106 mg/dL (10-08-24 @ 01:46)  POCT Blood Glucose.: 115 mg/dL (10-08-24 @ 01:12)  POCT Blood Glucose.: 118 mg/dL (10-07-24 @ 22:44)  POCT Blood Glucose.: 121 mg/dL (10-07-24 @ 21:46)  POCT Blood Glucose.: 122 mg/dL (10-07-24 @ 21:23)  POCT Blood Glucose.: 115 mg/dL (10-07-24 @ 19:51)  POCT Blood Glucose.: 133 mg/dL (10-07-24 @ 18:49)  POCT Blood Glucose.: 163 mg/dL (10-07-24 @ 18:07)  POCT Blood Glucose.: 184 mg/dL (10-07-24 @ 17:11)  POCT Blood Glucose.: 143 mg/dL (10-07-24 @ 16:05)  POCT Blood Glucose.: 189 mg/dL (10-07-24 @ 15:11)  POCT Blood Glucose.: 242 mg/dL (10-07-24 @ 13:54)  POCT Blood Glucose.: 225 mg/dL (10-07-24 @ 13:09)  POCT Blood Glucose.: 121 mg/dL (10-07-24 @ 12:13)  POCT Blood Glucose.: 131 mg/dL (10-07-24 @ 11:15)  POCT Blood Glucose.: 155 mg/dL (10-07-24 @ 10:12)  POCT Blood Glucose.: 174 mg/dL (10-07-24 @ 08:51)  POCT Blood Glucose.: 184 mg/dL (10-07-24 @ 07:58)  POCT Blood Glucose.: 130 mg/dL (10-07-24 @ 06:49)  POCT Blood Glucose.: 136 mg/dL (10-07-24 @ 06:08)  POCT Blood Glucose.: 143 mg/dL (10-07-24 @ 04:47)  POCT Blood Glucose.: 137 mg/dL (10-07-24 @ 03:56)  POCT Blood Glucose.: 147 mg/dL (10-07-24 @ 02:49)  POCT Blood Glucose.: 141 mg/dL (10-07-24 @ 01:49)  POCT Blood Glucose.: 160 mg/dL (10-07-24 @ 01:06)  POCT Blood Glucose.: 185 mg/dL (10-07-24 @ 00:00)  POCT Blood Glucose.: 210 mg/dL (10-06-24 @ 22:55)  POCT Blood Glucose.: 241 mg/dL (10-06-24 @ 22:07)  POCT Blood Glucose.: 269 mg/dL (10-06-24 @ 21:09)  POCT Blood Glucose.: 332 mg/dL (10-06-24 @ 19:48)  POCT Blood Glucose.: 214 mg/dL (10-06-24 @ 16:46)  POCT Blood Glucose.: 250 mg/dL (10-06-24 @ 15:11)  POCT Blood Glucose.: 96 mg/dL (10-06-24 @ 07:55)  POCT Blood Glucose.: 117 mg/dL (10-06-24 @ 06:51)  POCT Blood Glucose.: 208 mg/dL (10-06-24 @ 04:59)  POCT Blood Glucose.: 164 mg/dL (10-06-24 @ 02:56)  POCT Blood Glucose.: 172 mg/dL (10-06-24 @ 01:54)  POCT Blood Glucose.: 203 mg/dL (10-05-24 @ 23:05)  POCT Blood Glucose.: 215 mg/dL (10-05-24 @ 21:46)  POCT Blood Glucose.: 221 mg/dL (10-05-24 @ 19:46)  POCT Blood Glucose.: 211 mg/dL (10-05-24 @ 18:53)  POCT Blood Glucose.: 246 mg/dL (10-05-24 @ 17:46)  POCT Blood Glucose.: >600 mg/dL (10-05-24 @ 17:43)  POCT Blood Glucose.: 98 mg/dL (10-05-24 @ 12:30)                            7.9    22.72 )-----------( 149      ( 08 Oct 2024 00:31 )             24.0       10-08    132[L]  |  91[L]  |  26[H]  ----------------------------<  114[H]  3.5   |  29  |  1.23    eGFR: 46[L]    Ca    8.0[L]      10-08  Mg     2.0     10-08  Phos  2.8     10-08    TPro  6.3  /  Alb  4.0  /  TBili  1.7[H]  /  DBili  x   /  AST  245[H]  /  ALT  317[H]  /  AlkPhos  100  10-08      Thyroid Function Tests:  10-01 @ 11:30 TSH -- FreeT4 1.8 T3 82 Anti TPO -- Anti Thyroglobulin Ab -- TSI --  10-01 @ 03:02 TSH 0.63 FreeT4 -- T3 -- Anti TPO -- Anti Thyroglobulin Ab -- TSI --    Diet, Regular:   Consistent Carbohydrate No Snacks (CSTCHO) (10-05-24 @ 07:17) [Active]  Diet, Clear Liquid:   Consistent Carbohydrate No Snacks (CSTCHO) (10-03-24 @ 06:58) [Available for Activation]        10-01 Chol 82 Direct LDL -- LDL calculated 37 HDL 27[L] Trig 92  A1C with Estimated Average Glucose Result: 6.8 % (10-01-24 @ 03:02)                 HPI:  75 y/o female w/ PMH of hypothyroidism, CVA x2 (2018), HTN, HLD and DM who initially p/t [place] w/ worsening back pain, SOB, N/V and CP. She was transferred as a Tier I w/ c/f NSTEMI and hypotension (SBP 80s). Her son was at bedside and says she it typically mobile with her walker and has never had such back pain or CP. ECG with ST depressions I, II, aVL, V2-V5 and ST elevations aVR, III, V1. She was ASA and brilinta and heparin loaded at OSH. Pt. underwent R/LHC found to have severe LM and LAD disease and moderate pRCA disease. IABP placed for coronary perfusion and admitted to CICU for CABG eval. On arrival to unit pt. endorses nausea and had one episode of emesis relieved w/ antiemetics. (01 Oct 2024 03:06)      Patient has history of diabetes, A1C  6.8 % on home insulins   Endo was consulted for glycemic control.      PAST MEDICAL & SURGICAL HISTORY:  Hypothyroidism      Hypertension      Hyperlipidemia      Stroke      Diabetes      S/P cholecystectomy          FAMILY HISTORY:      Social History:            HOME MEDICATIONS:  Home Medications:  aspirin 81 mg oral tablet: orally once a day (02 Oct 2024 11:11)  atorvastatin 20 mg oral tablet: 1 tab(s) orally once a day (at bedtime) (02 Oct 2024 11:11)  diclofenac sodium 75 mg oral delayed release tablet: 1 tab(s) orally 2 times a day as needed (02 Oct 2024 11:10)  Lantus Solostar Pen 100 units/mL subcutaneous solution: 46 unit(s) subcutaneous once a day (at bedtime) (02 Oct 2024 11:10)  levothyroxine 75 mcg (0.075 mg) oral tablet: 1 tab(s) orally once a day (02 Oct 2024 11:07)  lisinopril 10 mg oral tablet: 1 tab(s) orally once a day (02 Oct 2024 11:11)            MEDICATIONS  (STANDING):  albuterol/ipratropium for Nebulization 3 milliLiter(s) Nebulizer every 8 hours  aMIOdarone    Tablet 400 milliGRAM(s) Oral every 8 hours  aMIOdarone    Tablet   Oral   ascorbic acid 500 milliGRAM(s) Oral two times a day  aspirin enteric coated 81 milliGRAM(s) Oral daily  bisacodyl Suppository 10 milliGRAM(s) Rectal once  chlorhexidine 2% Cloths 1 Application(s) Topical daily  dextrose 50% Injectable 50 milliLiter(s) IV Push every 15 minutes  furosemide   Injectable 40 milliGRAM(s) IV Push every 12 hours  gabapentin 100 milliGRAM(s) Oral every 8 hours  heparin   Injectable 5000 Unit(s) SubCutaneous every 12 hours  insulin glargine Injectable (LANTUS) 20 Unit(s) SubCutaneous at bedtime  insulin lispro (ADMELOG) corrective regimen sliding scale   SubCutaneous three times a day before meals  insulin lispro (ADMELOG) corrective regimen sliding scale   SubCutaneous at bedtime  insulin lispro Injectable (ADMELOG) 8 Unit(s) SubCutaneous three times a day before meals  levothyroxine 75 MICROGram(s) Oral daily  melatonin 5 milliGRAM(s) Oral at bedtime  milrinone Infusion 0.125 MICROgram(s)/kG/Min (2.98 mL/Hr) IV Continuous <Continuous>  piperacillin/tazobactam IVPB.. 3.375 Gram(s) IV Intermittent every 8 hours  polyethylene glycol 3350 17 Gram(s) Oral daily  senna 2 Tablet(s) Oral at bedtime  sodium chloride 0.9%. 1000 milliLiter(s) (10 mL/Hr) IV Continuous <Continuous>  vancomycin  IVPB 1000 milliGRAM(s) IV Intermittent every 24 hours    MEDICATIONS  (PRN):  oxyCODONE    IR 5 milliGRAM(s) Oral every 4 hours PRN Moderate Pain (4 - 6)  oxyCODONE    IR 10 milliGRAM(s) Oral every 4 hours PRN Severe Pain (7 - 10)      Allergies    No Known Allergies    Intolerances        Review of Systems:  Neuro: No HA, no dizziness  Cardiovascular: No chest pain, no palpitations  Respiratory: no SOB, no cough  GI: No nausea, vomiting, abdominal pain  MSK: Denies joint/muscle pain      ALL OTHER SYSTEMS REVIEWED AND NEGATIVE      PHYSICAL EXAM:  VITALS: T(C): 36.9 (10-08-24 @ 08:00)  T(F): 98.4 (10-08-24 @ 08:00), Max: 99.1 (10-07-24 @ 16:00)  HR: 93 (10-08-24 @ 11:21) (90 - 114)  BP: 101/54 (10-08-24 @ 04:00) (97/55 - 122/59)  RR:  (12 - 28)  SpO2:  (94% - 100%)  Wt(kg): --  GENERAL: NAD, well-groomed, well-developed  NEURO:  alert and oriented  RESPIRATORY: Clear to auscultation bilaterally; No rales, rhonchi, wheezing  CARDIOVASCULAR: Si S2  GI: Soft, non distended, normal bowel sounds  MUSCULOSKELETAL: Moves all extremities equally       POCT Blood Glucose.: 149 mg/dL (10-08-24 @ 10:55)  POCT Blood Glucose.: 182 mg/dL (10-08-24 @ 10:06)  POCT Blood Glucose.: 233 mg/dL (10-08-24 @ 08:46)  POCT Blood Glucose.: 194 mg/dL (10-08-24 @ 08:09)  POCT Blood Glucose.: 163 mg/dL (10-08-24 @ 06:45)  POCT Blood Glucose.: 155 mg/dL (10-08-24 @ 06:18)  POCT Blood Glucose.: 131 mg/dL (10-08-24 @ 04:54)  POCT Blood Glucose.: 111 mg/dL (10-08-24 @ 04:01)  POCT Blood Glucose.: 93 mg/dL (10-08-24 @ 02:50)  POCT Blood Glucose.: 106 mg/dL (10-08-24 @ 01:46)  POCT Blood Glucose.: 115 mg/dL (10-08-24 @ 01:12)  POCT Blood Glucose.: 118 mg/dL (10-07-24 @ 22:44)  POCT Blood Glucose.: 121 mg/dL (10-07-24 @ 21:46)  POCT Blood Glucose.: 122 mg/dL (10-07-24 @ 21:23)  POCT Blood Glucose.: 115 mg/dL (10-07-24 @ 19:51)  POCT Blood Glucose.: 133 mg/dL (10-07-24 @ 18:49)  POCT Blood Glucose.: 163 mg/dL (10-07-24 @ 18:07)  POCT Blood Glucose.: 184 mg/dL (10-07-24 @ 17:11)  POCT Blood Glucose.: 143 mg/dL (10-07-24 @ 16:05)  POCT Blood Glucose.: 189 mg/dL (10-07-24 @ 15:11)  POCT Blood Glucose.: 242 mg/dL (10-07-24 @ 13:54)  POCT Blood Glucose.: 225 mg/dL (10-07-24 @ 13:09)  POCT Blood Glucose.: 121 mg/dL (10-07-24 @ 12:13)  POCT Blood Glucose.: 131 mg/dL (10-07-24 @ 11:15)  POCT Blood Glucose.: 155 mg/dL (10-07-24 @ 10:12)  POCT Blood Glucose.: 174 mg/dL (10-07-24 @ 08:51)  POCT Blood Glucose.: 184 mg/dL (10-07-24 @ 07:58)  POCT Blood Glucose.: 130 mg/dL (10-07-24 @ 06:49)  POCT Blood Glucose.: 136 mg/dL (10-07-24 @ 06:08)  POCT Blood Glucose.: 143 mg/dL (10-07-24 @ 04:47)  POCT Blood Glucose.: 137 mg/dL (10-07-24 @ 03:56)  POCT Blood Glucose.: 147 mg/dL (10-07-24 @ 02:49)  POCT Blood Glucose.: 141 mg/dL (10-07-24 @ 01:49)  POCT Blood Glucose.: 160 mg/dL (10-07-24 @ 01:06)  POCT Blood Glucose.: 185 mg/dL (10-07-24 @ 00:00)  POCT Blood Glucose.: 210 mg/dL (10-06-24 @ 22:55)  POCT Blood Glucose.: 241 mg/dL (10-06-24 @ 22:07)  POCT Blood Glucose.: 269 mg/dL (10-06-24 @ 21:09)  POCT Blood Glucose.: 332 mg/dL (10-06-24 @ 19:48)  POCT Blood Glucose.: 214 mg/dL (10-06-24 @ 16:46)  POCT Blood Glucose.: 250 mg/dL (10-06-24 @ 15:11)  POCT Blood Glucose.: 96 mg/dL (10-06-24 @ 07:55)  POCT Blood Glucose.: 117 mg/dL (10-06-24 @ 06:51)  POCT Blood Glucose.: 208 mg/dL (10-06-24 @ 04:59)  POCT Blood Glucose.: 164 mg/dL (10-06-24 @ 02:56)  POCT Blood Glucose.: 172 mg/dL (10-06-24 @ 01:54)  POCT Blood Glucose.: 203 mg/dL (10-05-24 @ 23:05)  POCT Blood Glucose.: 215 mg/dL (10-05-24 @ 21:46)  POCT Blood Glucose.: 221 mg/dL (10-05-24 @ 19:46)  POCT Blood Glucose.: 211 mg/dL (10-05-24 @ 18:53)  POCT Blood Glucose.: 246 mg/dL (10-05-24 @ 17:46)  POCT Blood Glucose.: >600 mg/dL (10-05-24 @ 17:43)  POCT Blood Glucose.: 98 mg/dL (10-05-24 @ 12:30)                            7.9    22.72 )-----------( 149      ( 08 Oct 2024 00:31 )             24.0       10-08    132[L]  |  91[L]  |  26[H]  ----------------------------<  114[H]  3.5   |  29  |  1.23    eGFR: 46[L]    Ca    8.0[L]      10-08  Mg     2.0     10-08  Phos  2.8     10-08    TPro  6.3  /  Alb  4.0  /  TBili  1.7[H]  /  DBili  x   /  AST  245[H]  /  ALT  317[H]  /  AlkPhos  100  10-08      Thyroid Function Tests:  10-01 @ 11:30 TSH -- FreeT4 1.8 T3 82 Anti TPO -- Anti Thyroglobulin Ab -- TSI --  10-01 @ 03:02 TSH 0.63 FreeT4 -- T3 -- Anti TPO -- Anti Thyroglobulin Ab -- TSI --    Diet, Regular:   Consistent Carbohydrate No Snacks (CSTCHO) (10-05-24 @ 07:17) [Active]  Diet, Clear Liquid:   Consistent Carbohydrate No Snacks (CSTCHO) (10-03-24 @ 06:58) [Available for Activation]        10-01 Chol 82 Direct LDL -- LDL calculated 37 HDL 27[L] Trig 92  A1C with Estimated Average Glucose Result: 6.8 % (10-01-24 @ 03:02)

## 2024-10-08 NOTE — CONSULT NOTE ADULT - PROBLEM SELECTOR RECOMMENDATION 9
Will increase Lantus to 20 units at bed time. - received basal this AM   Will increase Admelog to 8  units before each meal in addition to Admelog correction scale coverage.  Will continue monitoring FS, log, and glucose trends, will Follow up.  Patient counseled for compliance with consistent low carb diet and exercise as tolerated outpatient.
Continue preop cardiac surgery workup  Check PFTs and US Carotids  F/U official TTE read  Continue asa 81, metoprolol 25 BID and atorvastatin 80 HS  Continue Heparin drip for IABP  Hold Brilinta - Repeat P2Y12 in AM 10/2 at 0400   Plan for CABG Wednesday 10/2 with Dr. Martini

## 2024-10-08 NOTE — CONSULT NOTE ADULT - ASSESSMENT
73 y/o female w/ PMH of hypothyroidism, CVA x2 (2018), HTN, HLD and DM on home insulins, now s/p cabg    Assessment  DMT2: 74y Female with DM T2 with hyperglycemia, A1C 6.8% , was on basal insulin at home, now on basal bolus insulin with coverage, blood sugars running high. IV insulin restarted briefly, basal bolus insulins increased.    received basal this AM along with IV insulin - will adjust basal dosing   Will need tight glycemic control for postop wound healing.   CAD: on medications, stable, monitored. s/p cabg   Hypothyroidism: On Synthroid 75 mcg po daily, compliant with Synthroid intake, asymptomatic. TFTs euthyroid, FT4 1.9 , rpt FT4 pending   HTN: on antihypertensive medications, monitored, asymptomatic.      Discussed plan and management wit Dr Kandace Jeronimo MD  Cell: [phone]  Office: [phone]             73 y/o female w/ PMH of hypothyroidism, CVA x2 (2018), HTN, HLD and DM on home insulins, now s/p cabg      Assessment  DMT2: 74y Female with DM T2 with hyperglycemia, A1C 6.8% , was on basal insulin at home, now on basal bolus insulin with coverage, blood sugars running high. IV insulin restarted briefly, basal bolus insulins increased.    received basal this AM along with IV insulin - will adjust basal dosing   Will need tight glycemic control for postop wound healing.   CAD: on medications, stable, monitored. s/p cabg   Hypothyroidism: On Synthroid 75 mcg po daily, compliant with Synthroid intake, asymptomatic. TFTs euthyroid, FT4 1.9 , rpt FT4 pending   HTN: on antihypertensive medications, monitored, asymptomatic.      Discussed plan and management wit Dr Kandace Jeronimo MD  Cell: [phone]  Office: [phone]

## 2024-10-08 NOTE — CONSULT NOTE ADULT - NS ATTEND AMEND GEN_ALL_CORE FT
74F h/o hypothyroidism, CVA x2 [2018] w/residual left-sided weakness, who p/w 24 hours of progressive chest pain, NSTEMI and subsequently found ot have critical left main coronary artery disease. Was ASA / Brilinta loaded. IABP placed in cath lab. Echo with normal biventricular dysfunction and without significant valvular abnormalities.       - 70-year-old lady with severe multivessel coronary artery disease, would benefit from surgical revascularization. Plan for coronary artery bypass grafting this Thursday [10/3/2024].      Rafael Martini MD  Cardiac Surgery
Chart, labs, vitals, radiology reviewed. Above H&P reviewed and edited where appropriate. Agree with history and physical exam. Agree with assessment and plan. I reviewed the overnight course of events and discussed the care with the patient/ family. All the decisions in assessment and plan are made by me.

## 2024-10-08 NOTE — CONSULT NOTE ADULT - REASON FOR ADMISSION
Home Care After Surgery  By Ezekiel Gibson, [employer]    Vitrectomy       A Vitrectomy is a surgery to remove the jelly-like vitreous, often in conjuction with blood or scar tissue, in the center of the eye.  This is then replaced by a clear liquid, gas or silicone oil.      If there is a bubble in your eye you can expect it to go away in anywhere from a few days to six weeks, depending on what type of intraocular gas was used by your surgeon.    Comfort Measures:  1.  Some discomfort after surgery is expected and can often be relieved by taking two tablets of Tylenol (Acetaminophen) up to every 4 hours or medications such as Advil or Alleve in recommended doses.  2. If you have nausea, you may use medication if prescribed by your physician.  Try to at least sip on water or other clear liquids intermittently to prevent dehydration.  3. Cool moist washcloths or ice can be placed over your operative eye for the first 24 to 48 hours.  After 48 hours some patients find warm compresses soothing.  Some swelling of the lids is normal, with this potentially becoming quite prominent, especially if a face down position is required.  4. If you have significant pain in the operative eye not relived by the above medications or other questions, a [facility] doctor is available 24 hours a day to help you at [phone] or [phone].    Eye Care:  DO NOT RUB YOUR EYES  1. The operative eye should be kept as clean as possible.  2. Always wash hands with soap and warm water and dry them on a clean towel before performing any type of eye care.  3. To clean the eye, take a cotton ball or clean wash cloth and wet it with tap water,   Close your eye and gently wipe it.    Some drainage, blood tinged tears, or crusting on the lid for the first 5 to 7 days is not unusual.  Eye Drops:  1. Your eye drops will be labeled with directions for your use. Bring all your eye drops to your eye appointment the day  after surgery.  2. USE ONLY THE EYE DROPS THAT YOU WERE INSTRUCTED TO USE BY YOUR PHYSICIAN.    Head Position:  The way you hold your head is very important IF an intraocular gas bubble or silicone oil were placed at the time of surgery.  The nursing staff will instruct you on what position if any your head must be held in and how many days your specific position is required.    Your specific position will be: To lie on the left side with the head elevated 45 degrees 20 hours per day, not to lie on your back, may sit for meals and bathroom privileges    DO NOT LAY ON YOUR BACK AS LONG AS THERE IS A BUBBLE IN YOUR EYE    You may sit up to eat, go to the bathroom, come to the doctor for your appointments and to stretch your legs, however, these periods should not be for more than approximately 20 minutes at a time and should not add up to more than 3 or 4 hours per day unless otherwise indicated by your physician    Eye Patches:  1. You need to wear a shield or glasses over the operated eye at home, especially at night for the first two weeks after surgery.  2. Do not replace the gauze pad underneath the plastic or metal shield.    Activity Allowed at Home:  1. Bending and stooping to perform nonstrenuous activities such as tying your shoes or picking up a light object.  2. Watching TV  3. May bathe but, always keep your eyes dry.  4. Walking and climbing stairs.  5. Combing and washing your hair, attempting to avoid soapy water in the eye.     Activity Not Allowed Until Your Next Visit to the Doctor:  1. Car rides except to keep your appointment with your physician.  2. Going into an area with dust or dirt in the air.  3. Driving.  4. Heavy housework (scrubbing floor, vacuuming, lifting things that are heavier than what you can lift with one hand, etc.)  5. Sports activities.  6. Swimming.  7. Care of small children.  8. Avoid eye makeup for 1- 3 weeks as directed by your surgeon.  9. Ask your doctor when you can  return to work and return to usual activity.     Call Your Doctor if You Have Any of The Following Things Happen:  1. Increased persistent pain in your operated eye.  2. Sudden persistent decrease in how well you can see.  3. More drainage or change in color of the drainage from your operative eye.    IF YOU HAVE ANY QUESTIONS OR PROBLEMS CALL [phone] or  [phone]; A [facility] DOCTOR IS AVAILABLE 24 HOURS PER DAY.      Patient and significant other have verbalized  understanding of these instructions and have  received a copy.     It has been our privilege to take care of you today.  Our goal has been to make sure you and your family always felt confident in you care while you were here.  If at any time after you leave that questions, concerns, or worries should arise do not hesitate to contact us.  It is always important that we treat you with the courtesy and respect that you deserve.  Thank you for choosing [facility] for your care today!       CP

## 2024-10-08 NOTE — CONSULT NOTE ADULT - PROBLEM SELECTOR RECOMMENDATION 2
On medications,  no chest pain, stable, monitored and followed up by primary cardiothoracic team/cardiology team.
Continue synthroid 75mcg daily  Check Thyroid panel

## 2024-10-09 LAB
ALBUMIN SERPL ELPH-MCNC: 3.5 G/DL — SIGNIFICANT CHANGE UP (ref 3.3–5)
ALP SERPL-CCNC: 105 U/L — SIGNIFICANT CHANGE UP (ref 40–120)
ALT FLD-CCNC: 250 U/L — HIGH (ref 10–45)
ANION GAP SERPL CALC-SCNC: 12 MMOL/L — SIGNIFICANT CHANGE UP (ref 5–17)
AST SERPL-CCNC: 138 U/L — HIGH (ref 10–40)
BASOPHILS # BLD AUTO: 0.05 K/UL — SIGNIFICANT CHANGE UP (ref 0–0.2)
BASOPHILS NFR BLD AUTO: 0.3 % — SIGNIFICANT CHANGE UP (ref 0–2)
BILIRUB SERPL-MCNC: 1.5 MG/DL — HIGH (ref 0.2–1.2)
BUN SERPL-MCNC: 25 MG/DL — HIGH (ref 7–23)
CALCIUM SERPL-MCNC: 8.2 MG/DL — LOW (ref 8.4–10.5)
CHLORIDE SERPL-SCNC: 91 MMOL/L — LOW (ref 96–108)
CO2 SERPL-SCNC: 29 MMOL/L — SIGNIFICANT CHANGE UP (ref 22–31)
CREAT SERPL-MCNC: 1.16 MG/DL — SIGNIFICANT CHANGE UP (ref 0.5–1.3)
EGFR: 49 ML/MIN/1.73M2 — LOW
EOSINOPHIL # BLD AUTO: 0.25 K/UL — SIGNIFICANT CHANGE UP (ref 0–0.5)
EOSINOPHIL NFR BLD AUTO: 1.3 % — SIGNIFICANT CHANGE UP (ref 0–6)
GLUCOSE BLDC GLUCOMTR-MCNC: 131 MG/DL — HIGH (ref 70–99)
GLUCOSE BLDC GLUCOMTR-MCNC: 178 MG/DL — HIGH (ref 70–99)
GLUCOSE BLDC GLUCOMTR-MCNC: 186 MG/DL — HIGH (ref 70–99)
GLUCOSE BLDC GLUCOMTR-MCNC: 197 MG/DL — HIGH (ref 70–99)
GLUCOSE BLDC GLUCOMTR-MCNC: 215 MG/DL — HIGH (ref 70–99)
GLUCOSE SERPL-MCNC: 158 MG/DL — HIGH (ref 70–99)
HCT VFR BLD CALC: 30.8 % — LOW (ref 34.5–45)
HGB BLD-MCNC: 10 G/DL — LOW (ref 11.5–15.5)
IMM GRANULOCYTES NFR BLD AUTO: 3.2 % — HIGH (ref 0–0.9)
LYMPHOCYTES # BLD AUTO: 11 % — LOW (ref 13–44)
LYMPHOCYTES # BLD AUTO: 2.07 K/UL — SIGNIFICANT CHANGE UP (ref 1–3.3)
MAGNESIUM SERPL-MCNC: 2.3 MG/DL — SIGNIFICANT CHANGE UP (ref 1.6–2.6)
MCHC RBC-ENTMCNC: 28.8 PG — SIGNIFICANT CHANGE UP (ref 27–34)
MCHC RBC-ENTMCNC: 32.5 GM/DL — SIGNIFICANT CHANGE UP (ref 32–36)
MCV RBC AUTO: 88.8 FL — SIGNIFICANT CHANGE UP (ref 80–100)
MONOCYTES # BLD AUTO: 1.79 K/UL — HIGH (ref 0–0.9)
MONOCYTES NFR BLD AUTO: 9.5 % — SIGNIFICANT CHANGE UP (ref 2–14)
NEUTROPHILS # BLD AUTO: 14 K/UL — HIGH (ref 1.8–7.4)
NEUTROPHILS NFR BLD AUTO: 74.7 % — SIGNIFICANT CHANGE UP (ref 43–77)
NRBC # BLD: 0 /100 WBCS — SIGNIFICANT CHANGE UP (ref 0–0)
PHOSPHATE SERPL-MCNC: 3 MG/DL — SIGNIFICANT CHANGE UP (ref 2.5–4.5)
PLATELET # BLD AUTO: 160 K/UL — SIGNIFICANT CHANGE UP (ref 150–400)
POTASSIUM SERPL-MCNC: 4.4 MMOL/L — SIGNIFICANT CHANGE UP (ref 3.5–5.3)
POTASSIUM SERPL-SCNC: 4.4 MMOL/L — SIGNIFICANT CHANGE UP (ref 3.5–5.3)
PROT SERPL-MCNC: 6.4 G/DL — SIGNIFICANT CHANGE UP (ref 6–8.3)
RBC # BLD: 3.47 M/UL — LOW (ref 3.8–5.2)
RBC # FLD: 15.6 % — HIGH (ref 10.3–14.5)
SODIUM SERPL-SCNC: 132 MMOL/L — LOW (ref 135–145)
WBC # BLD: 18.77 K/UL — HIGH (ref 3.8–10.5)
WBC # FLD AUTO: 18.77 K/UL — HIGH (ref 3.8–10.5)

## 2024-10-09 RX ORDER — INSULIN GLARGINE 300 U/ML
30 INJECTION, SOLUTION SUBCUTANEOUS AT BEDTIME
Refills: 0 | Status: DISCONTINUED | OUTPATIENT
Start: 2024-10-09 | End: 2024-10-10

## 2024-10-09 RX ADMIN — AMIODARONE HYDROCHLORIDE 400 MILLIGRAM(S): 50 INJECTION, SOLUTION INTRAVENOUS at 13:33

## 2024-10-09 RX ADMIN — CHLORHEXIDINE GLUCONATE ORAL RINSE 1 APPLICATION(S): 1.2 SOLUTION DENTAL at 11:31

## 2024-10-09 RX ADMIN — Medication 2: at 11:30

## 2024-10-09 RX ADMIN — Medication 10 UNIT(S): at 11:30

## 2024-10-09 RX ADMIN — IPRATROPIUM BROMIDE AND ALBUTEROL SULFATE 3 MILLILITER(S): .5; 3 SOLUTION RESPIRATORY (INHALATION) at 13:34

## 2024-10-09 RX ADMIN — Medication 10 UNIT(S): at 08:13

## 2024-10-09 RX ADMIN — SPIRONOLACTONE 25 MILLIGRAM(S): 100 TABLET, FILM COATED ORAL at 06:31

## 2024-10-09 RX ADMIN — Medication 4: at 17:13

## 2024-10-09 RX ADMIN — AMIODARONE HYDROCHLORIDE 400 MILLIGRAM(S): 50 INJECTION, SOLUTION INTRAVENOUS at 21:44

## 2024-10-09 RX ADMIN — Medication 5000 UNIT(S): at 06:30

## 2024-10-09 RX ADMIN — PIPERACILLIN SODIUM AND TAZOBACTAM SODIUM 25 GRAM(S): 12; 1.5 INJECTION, POWDER, LYOPHILIZED, FOR SOLUTION INTRAVENOUS at 06:29

## 2024-10-09 RX ADMIN — Medication 81 MILLIGRAM(S): at 11:08

## 2024-10-09 RX ADMIN — FUROSEMIDE 40 MILLIGRAM(S): 10 INJECTION INTRAVENOUS at 06:30

## 2024-10-09 RX ADMIN — AMIODARONE HYDROCHLORIDE 400 MILLIGRAM(S): 50 INJECTION, SOLUTION INTRAVENOUS at 06:30

## 2024-10-09 RX ADMIN — PIPERACILLIN SODIUM AND TAZOBACTAM SODIUM 25 GRAM(S): 12; 1.5 INJECTION, POWDER, LYOPHILIZED, FOR SOLUTION INTRAVENOUS at 21:47

## 2024-10-09 RX ADMIN — INSULIN GLARGINE 30 UNIT(S): 300 INJECTION, SOLUTION SUBCUTANEOUS at 21:43

## 2024-10-09 RX ADMIN — PIPERACILLIN SODIUM AND TAZOBACTAM SODIUM 25 GRAM(S): 12; 1.5 INJECTION, POWDER, LYOPHILIZED, FOR SOLUTION INTRAVENOUS at 13:35

## 2024-10-09 RX ADMIN — IPRATROPIUM BROMIDE AND ALBUTEROL SULFATE 3 MILLILITER(S): .5; 3 SOLUTION RESPIRATORY (INHALATION) at 06:29

## 2024-10-09 RX ADMIN — Medication 75 MICROGRAM(S): at 06:30

## 2024-10-09 RX ADMIN — IPRATROPIUM BROMIDE AND ALBUTEROL SULFATE 3 MILLILITER(S): .5; 3 SOLUTION RESPIRATORY (INHALATION) at 21:47

## 2024-10-09 RX ADMIN — Medication 5 MILLIGRAM(S): at 22:07

## 2024-10-09 RX ADMIN — Medication 17 GRAM(S): at 11:08

## 2024-10-09 RX ADMIN — FUROSEMIDE 40 MILLIGRAM(S): 10 INJECTION INTRAVENOUS at 17:18

## 2024-10-09 RX ADMIN — Medication 5000 UNIT(S): at 17:16

## 2024-10-09 RX ADMIN — Medication 2 TABLET(S): at 21:44

## 2024-10-09 NOTE — PROGRESS NOTE ADULT - SUBJECTIVE AND OBJECTIVE BOX
Chief complaint  Patient is a 74y old  Female who presents with a chief complaint of CP (09 Oct 2024 08:48)         Labs and Fingersticks  CAPILLARY BLOOD GLUCOSE      POCT Blood Glucose.: 197 mg/dL (09 Oct 2024 10:59)  POCT Blood Glucose.: 131 mg/dL (09 Oct 2024 07:15)  POCT Blood Glucose.: 169 mg/dL (08 Oct 2024 21:08)  POCT Blood Glucose.: 173 mg/dL (08 Oct 2024 17:15)      Anion Gap: 12 (10-09 @ 06:22)  Anion Gap: 12 (10-08 @ 17:00)  Anion Gap: 12 (10-08 @ 00:31)  Anion Gap: 15 (10-07 @ 14:04)      Calcium: 8.2 *L* (10-09 @ 06:22)  Calcium: 8.5 (10-08 @ 17:00)  Calcium: 8.0 *L* (10-08 @ 00:31)  Calcium: 8.2 *L* (10-07 @ 14:04)  Albumin: 3.5 (10-09 @ 06:22)  Albumin: 4.0 (10-08 @ 00:31)  Albumin: 4.0 (10-07 @ 14:04)    Alanine Aminotransferase (ALT/SGPT): 250 *H* (10-09 @ 06:22)  Alanine Aminotransferase (ALT/SGPT): 317 *H* (10-08 @ 00:31)  Alanine Aminotransferase (ALT/SGPT): 422 *H* (10-07 @ 14:04)  Alkaline Phosphatase: 105 (10-09 @ 06:22)  Alkaline Phosphatase: 100 (10-08 @ 00:31)  Alkaline Phosphatase: 109 (10-07 @ 14:04)  Aspartate Aminotransferase (AST/SGOT): 138 *H* (10-09 @ 06:22)  Aspartate Aminotransferase (AST/SGOT): 245 *H* (10-08 @ 00:31)  Aspartate Aminotransferase (AST/SGOT): 362 *H* (10-07 @ 14:04)        10-09    132[L]  |  91[L]  |  25[H]  ----------------------------<  158[H]  4.4   |  29  |  1.16    Ca    8.2[L]      09 Oct 2024 06:22  Phos  3.0     10-09  Mg     2.3     10-09    TPro  6.4  /  Alb  3.5  /  TBili  1.5[H]  /  DBili  x   /  AST  138[H]  /  ALT  250[H]  /  AlkPhos  105  10-09                        10.0   18.77 )-----------( 160      ( 09 Oct 2024 06:22 )             30.8     Medications  MEDICATIONS  (STANDING):  albuterol/ipratropium for Nebulization 3 milliLiter(s) Nebulizer every 8 hours  aMIOdarone    Tablet 400 milliGRAM(s) Oral every 8 hours  aMIOdarone    Tablet   Oral   aspirin enteric coated 81 milliGRAM(s) Oral daily  bisacodyl Suppository 10 milliGRAM(s) Rectal once  chlorhexidine 2% Cloths 1 Application(s) Topical daily  dextrose 50% Injectable 50 milliLiter(s) IV Push every 15 minutes  furosemide   Injectable 40 milliGRAM(s) IV Push every 12 hours  heparin   Injectable 5000 Unit(s) SubCutaneous every 12 hours  insulin glargine Injectable (LANTUS) 30 Unit(s) SubCutaneous at bedtime  insulin lispro (ADMELOG) corrective regimen sliding scale   SubCutaneous three times a day before meals  insulin lispro (ADMELOG) corrective regimen sliding scale   SubCutaneous at bedtime  insulin lispro Injectable (ADMELOG) 10 Unit(s) SubCutaneous three times a day before meals  levothyroxine 75 MICROGram(s) Oral daily  melatonin 5 milliGRAM(s) Oral at bedtime  piperacillin/tazobactam IVPB.. 3.375 Gram(s) IV Intermittent every 8 hours  polyethylene glycol 3350 17 Gram(s) Oral daily  senna 2 Tablet(s) Oral at bedtime  sodium chloride 0.9%. 1000 milliLiter(s) (10 mL/Hr) IV Continuous <Continuous>  spironolactone 25 milliGRAM(s) Oral daily      Physical Exam  General: Patient comfortable in bed   Vital Signs Last 12 Hrs  T(F): 98 (10-09-24 @ 07:18), Max: 98.5 (10-09-24 @ 03:08)  HR: 91 (10-09-24 @ 10:58) (87 - 95)  BP: 120/55 (10-09-24 @ 10:58) (120/55 - 121/57)  BP(mean): 79 (10-09-24 @ 10:58) (79 - 82)  RR: 18 (10-09-24 @ 10:58) (18 - 18)  SpO2: 92% (10-09-24 @ 10:58) (90% - 99%)    CVS: S1S2   Respiratory: No wheezing, no crepitations  GI: Abdomen soft, bowel sounds positive  Musculoskeletal:  moves all extremities  : Voiding       Chief complaint  Patient is a 74y old  Female who presents with a chief complaint of CP (09 Oct 2024 08:48)         Labs and Fingersticks  CAPILLARY BLOOD GLUCOSE      POCT Blood Glucose.: 197 mg/dL (09 Oct 2024 10:59)  POCT Blood Glucose.: 131 mg/dL (09 Oct 2024 07:15)  POCT Blood Glucose.: 169 mg/dL (08 Oct 2024 21:08)  POCT Blood Glucose.: 173 mg/dL (08 Oct 2024 17:15)      Anion Gap: 12 (10-09 @ 06:22)  Anion Gap: 12 (10-08 @ 17:00)  Anion Gap: 12 (10-08 @ 00:31)  Anion Gap: 15 (10-07 @ 14:04)      Calcium: 8.2 *L* (10-09 @ 06:22)  Calcium: 8.5 (10-08 @ 17:00)  Calcium: 8.0 *L* (10-08 @ 00:31)  Calcium: 8.2 *L* (10-07 @ 14:04)  Albumin: 3.5 (10-09 @ 06:22)  Albumin: 4.0 (10-08 @ 00:31)  Albumin: 4.0 (10-07 @ 14:04)    Alanine Aminotransferase (ALT/SGPT): 250 *H* (10-09 @ 06:22)  Alanine Aminotransferase (ALT/SGPT): 317 *H* (10-08 @ 00:31)  Alanine Aminotransferase (ALT/SGPT): 422 *H* (10-07 @ 14:04)  Alkaline Phosphatase: 105 (10-09 @ 06:22)  Alkaline Phosphatase: 100 (10-08 @ 00:31)  Alkaline Phosphatase: 109 (10-07 @ 14:04)  Aspartate Aminotransferase (AST/SGOT): 138 *H* (10-09 @ 06:22)  Aspartate Aminotransferase (AST/SGOT): 245 *H* (10-08 @ 00:31)  Aspartate Aminotransferase (AST/SGOT): 362 *H* (10-07 @ 14:04)        10-09    132[L]  |  91[L]  |  25[H]  ----------------------------<  158[H]  4.4   |  29  |  1.16    Ca    8.2[L]      09 Oct 2024 06:22  Phos  3.0     10-09  Mg     2.3     10-09    TPro  6.4  /  Alb  3.5  /  TBili  1.5[H]  /  DBili  x   /  AST  138[H]  /  ALT  250[H]  /  AlkPhos  105  10-09                        10.0   18.77 )-----------( 160      ( 09 Oct 2024 06:22 )             30.8     Medications  MEDICATIONS  (STANDING):  albuterol/ipratropium for Nebulization 3 milliLiter(s) Nebulizer every 8 hours  aMIOdarone    Tablet 400 milliGRAM(s) Oral every 8 hours  aMIOdarone    Tablet   Oral   aspirin enteric coated 81 milliGRAM(s) Oral daily  bisacodyl Suppository 10 milliGRAM(s) Rectal once  chlorhexidine 2% Cloths 1 Application(s) Topical daily  dextrose 50% Injectable 50 milliLiter(s) IV Push every 15 minutes  furosemide   Injectable 40 milliGRAM(s) IV Push every 12 hours  heparin   Injectable 5000 Unit(s) SubCutaneous every 12 hours  insulin glargine Injectable (LANTUS) 30 Unit(s) SubCutaneous at bedtime  insulin lispro (ADMELOG) corrective regimen sliding scale   SubCutaneous three times a day before meals  insulin lispro (ADMELOG) corrective regimen sliding scale   SubCutaneous at bedtime  insulin lispro Injectable (ADMELOG) 10 Unit(s) SubCutaneous three times a day before meals  levothyroxine 75 MICROGram(s) Oral daily  melatonin 5 milliGRAM(s) Oral at bedtime  piperacillin/tazobactam IVPB.. 3.375 Gram(s) IV Intermittent every 8 hours  polyethylene glycol 3350 17 Gram(s) Oral daily  senna 2 Tablet(s) Oral at bedtime  sodium chloride 0.9%. 1000 milliLiter(s) (10 mL/Hr) IV Continuous <Continuous>  spironolactone 25 milliGRAM(s) Oral daily      Physical Exam  General: Patient comfortable in bed   Vital Signs Last 12 Hrs  T(F): 98 (10-09-24 @ 07:18), Max: 98.5 (10-09-24 @ 03:08)  HR: 91 (10-09-24 @ 10:58) (87 - 95)  BP: 120/55 (10-09-24 @ 10:58) (120/55 - 121/57)  BP(mean): 79 (10-09-24 @ 10:58) (79 - 82)  RR: 18 (10-09-24 @ 10:58) (18 - 18)  SpO2: 92% (10-09-24 @ 10:58) (90% - 99%)    CVS: S1S2   Respiratory: No wheezing, no crepitations  GI: Abdomen soft, bowel sounds positive  Musculoskeletal:  moves all extremities  : Voiding

## 2024-10-09 NOTE — PROGRESS NOTE ADULT - ASSESSMENT
75 y/o female w/ PMH of hypothyroidism, CVA x2 (2018), HTN, HLD and DM on home insulins, now s/p cabg    Assessment  DMT2: 74y Female with DM T2 with hyperglycemia, A1C 6.8% , was on basal insulin at home, now on basal bolus insulin with coverage, blood sugars running high. IV insulin restarted briefly, basal bolus insulins increased.   Will need tight glycemic control for postop wound healing.   CAD: on medications, stable, monitored. s/p cabg   Hypothyroidism: On Synthroid 75 mcg po daily, compliant with Synthroid intake, asymptomatic. TFTs euthyroid, FT4 1.9 , rpt FT4   HTN: on antihypertensive medications, monitored, asymptomatic.      Discussed plan and management wit Dr Kandace Jeronimo MD  Cell: [phone]  Office: [phone]             73 y/o female w/ PMH of hypothyroidism, CVA x2 (2018), HTN, HLD and DM on home insulins, now s/p cabg      Assessment  DMT2: 74y Female with DM T2 with hyperglycemia, A1C 6.8% , was on basal insulin at home, now on basal bolus insulin with coverage, blood sugars running high. IV insulin restarted briefly, basal bolus insulins increased.   Will need tight glycemic control for postop wound healing.   CAD: on medications, stable, monitored. s/p cabg   Hypothyroidism: On Synthroid 75 mcg po daily, compliant with Synthroid intake, asymptomatic. TFTs euthyroid, FT4 1.9 , rpt FT4   HTN: on antihypertensive medications, monitored, asymptomatic.      Discussed plan and management wit Dr Kandace Jeronimo MD  Cell: [phone]  Office: [phone]

## 2024-10-09 NOTE — PROGRESS NOTE ADULT - SUBJECTIVE AND OBJECTIVE BOX
VITAL SIGNS    Telemetry:  nsr 80    Vital Signs Last 24 Hrs  T(C): 36.7 (10-09-24 @ 07:18), Max: 36.9 (10-08-24 @ 12:00)  T(F): 98 (10-09-24 @ 07:18), Max: 98.5 (10-09-24 @ 03:08)  HR: 92 (10-09-24 @ 07:18) (87 - 104)  BP: 121/57 (10-09-24 @ 07:18) (120/56 - 132/58)  RR: 18 (10-09-24 @ 03:08) (18 - 30)  SpO2: 90% (10-09-24 @ 07:18) (90% - 100%)                   10-08 @ 07:01  -  10-09 @ 07:00  --------------------------------------------------------  IN: 1566.4 mL / OUT: 2250 mL / NET: -683.6 mL    10-09 @ 07:01  -  10-09 @ 08:48  --------------------------------------------------------  IN: 0 mL / OUT: 0 mL / NET: 0 mL          Daily     Daily Weight in k.4 (09 Oct 2024 06:28)            CAPILLARY BLOOD GLUCOSE  212 (08 Oct 2024 12:00)  149 (08 Oct 2024 11:00)  182 (08 Oct 2024 10:00)  233 (08 Oct 2024 09:00)      POCT Blood Glucose.: 131 mg/dL (09 Oct 2024 07:15)  POCT Blood Glucose.: 169 mg/dL (08 Oct 2024 21:08)  POCT Blood Glucose.: 173 mg/dL (08 Oct 2024 17:15)  POCT Blood Glucose.: 212 mg/dL (08 Oct 2024 12:27)  POCT Blood Glucose.: 149 mg/dL (08 Oct 2024 10:55)  POCT Blood Glucose.: 182 mg/dL (08 Oct 2024 10:06)            Drains:         Pacing Wires        x[  ]   Settings:      vvi                            Isolated  [  ]    Coumadin    [ ] YES          [ x ]      NO                                   PHYSICAL EXAM    Neurology: alert and oriented x 3, nonfocal, no gross deficits  CV : s1 s2 RRR  R brach mar  Sternal Wound :  CDI , Stable  Lungs:  rll crackles  Abdomen: soft, nontender, nondistended, positive bowel sounds, bm 2 days ago                        :    hagan - sbd         Extremities:    +  edema   /  -   calve tenderness ,    L leg  /  R leg  incisions cdi              albuterol/ipratropium for Nebulization 3 milliLiter(s) Nebulizer every 8 hours  aMIOdarone    Tablet 400 milliGRAM(s) Oral every 8 hours  aMIOdarone    Tablet   Oral   aspirin enteric coated 81 milliGRAM(s) Oral daily  bisacodyl Suppository 10 milliGRAM(s) Rectal once  chlorhexidine 2% Cloths 1 Application(s) Topical daily  dextrose 50% Injectable 50 milliLiter(s) IV Push every 15 minutes  furosemide   Injectable 40 milliGRAM(s) IV Push every 12 hours  heparin   Injectable 5000 Unit(s) SubCutaneous every 12 hours  insulin glargine Injectable (LANTUS) 20 Unit(s) SubCutaneous at bedtime  insulin lispro (ADMELOG) corrective regimen sliding scale   SubCutaneous three times a day before meals  insulin lispro (ADMELOG) corrective regimen sliding scale   SubCutaneous at bedtime  insulin lispro Injectable (ADMELOG) 10 Unit(s) SubCutaneous three times a day before meals  levothyroxine 75 MICROGram(s) Oral daily  melatonin 5 milliGRAM(s) Oral at bedtime  oxyCODONE    IR 5 milliGRAM(s) Oral every 4 hours PRN  oxyCODONE    IR 10 milliGRAM(s) Oral every 4 hours PRN  piperacillin/tazobactam IVPB.. 3.375 Gram(s) IV Intermittent every 8 hours  polyethylene glycol 3350 17 Gram(s) Oral daily  senna 2 Tablet(s) Oral at bedtime  sodium chloride 0.9%. 1000 milliLiter(s) IV Continuous <Continuous>  spironolactone 25 milliGRAM(s) Oral daily                    Physical Therapy Rec:   Home  [  ]   Home w/ PT  [  ]  Rehab  [  ]  Discussed with Cardiothoracic Team at AM rounds.

## 2024-10-09 NOTE — PROGRESS NOTE ADULT - ASSESSMENT
73 y/o female w/ PMH of hypothyroidism, CVA x2 (2018), HTN, HLD and DM who initially p/t [place] w/ worsening back pain, SOB, N/V and CP. She was transferred as a Tier I w/ c/f NSTEMI and hypotension (SBP 80s). Her son was at bedside and says she it typically mobile with her walker and has never had such back pain or CP. ECG with ST depressions I, II, aVL, V2-V5 and ST elevations aVR, III, V1. She was ASA and brilinta and heparin loaded at OSH. Pt. underwent R/LHC found to have severe LM and LAD disease and moderate pRCA disease. IABP placed for coronary perfusion and admitted to CICU for CABG eval. On arrival to unit pt. endorses nausea and had one episode of emesis relieved w/ antiemetics. (01 Oct 2024 03:06)  10/3/24 CABG, with SAMUEL , LIMA to LAD, SVG to OM,SVG to Diag  AGNIESZKA Clip   , IABP ef 45  Labile hemodynamics  volume resuscitation  pressor + inotrope  Extubated d 1, iabp d/c   wean , decrease in MVO2, increasd  PAF, amio load  Insulin infusion  10/5  off since 11 AM 10/5- inotrope weaned - later noted to have elevated Lactate, rising LFTs, milrinone restarted, also concern for pulm congestion - bumex drip started    , LFTs in 1000's  Started on milrinone , Bumex gtt and  Amio  d/tameka.  statin on hold for lfts   TTE organized clot adjacent RVOT  Zosyn/vanco for leukocytosis, cultures neg  10/8  Transferred sdu  10/9  d/c milrinone  d/c mar  Cont iv diuretics  D/c hagan in am tomorrow

## 2024-10-10 LAB
ANION GAP SERPL CALC-SCNC: 11 MMOL/L — SIGNIFICANT CHANGE UP (ref 5–17)
BASOPHILS # BLD AUTO: 0.07 K/UL — SIGNIFICANT CHANGE UP (ref 0–0.2)
BASOPHILS NFR BLD AUTO: 0.3 % — SIGNIFICANT CHANGE UP (ref 0–2)
BUN SERPL-MCNC: 25 MG/DL — HIGH (ref 7–23)
CALCIUM SERPL-MCNC: 8.3 MG/DL — LOW (ref 8.4–10.5)
CHLORIDE SERPL-SCNC: 91 MMOL/L — LOW (ref 96–108)
CO2 SERPL-SCNC: 30 MMOL/L — SIGNIFICANT CHANGE UP (ref 22–31)
CREAT SERPL-MCNC: 1.05 MG/DL — SIGNIFICANT CHANGE UP (ref 0.5–1.3)
EGFR: 56 ML/MIN/1.73M2 — LOW
EOSINOPHIL # BLD AUTO: 0.27 K/UL — SIGNIFICANT CHANGE UP (ref 0–0.5)
EOSINOPHIL NFR BLD AUTO: 1.3 % — SIGNIFICANT CHANGE UP (ref 0–6)
GLUCOSE BLDC GLUCOMTR-MCNC: 116 MG/DL — HIGH (ref 70–99)
GLUCOSE BLDC GLUCOMTR-MCNC: 126 MG/DL — HIGH (ref 70–99)
GLUCOSE BLDC GLUCOMTR-MCNC: 137 MG/DL — HIGH (ref 70–99)
GLUCOSE BLDC GLUCOMTR-MCNC: 98 MG/DL — SIGNIFICANT CHANGE UP (ref 70–99)
GLUCOSE SERPL-MCNC: 97 MG/DL — SIGNIFICANT CHANGE UP (ref 70–99)
HCT VFR BLD CALC: 31.8 % — LOW (ref 34.5–45)
HGB BLD-MCNC: 10.8 G/DL — LOW (ref 11.5–15.5)
IMM GRANULOCYTES NFR BLD AUTO: 1.9 % — HIGH (ref 0–0.9)
LYMPHOCYTES # BLD AUTO: 14.3 % — SIGNIFICANT CHANGE UP (ref 13–44)
LYMPHOCYTES # BLD AUTO: 2.96 K/UL — SIGNIFICANT CHANGE UP (ref 1–3.3)
MAGNESIUM SERPL-MCNC: 2.3 MG/DL — SIGNIFICANT CHANGE UP (ref 1.6–2.6)
MCHC RBC-ENTMCNC: 30 PG — SIGNIFICANT CHANGE UP (ref 27–34)
MCHC RBC-ENTMCNC: 34 GM/DL — SIGNIFICANT CHANGE UP (ref 32–36)
MCV RBC AUTO: 88.3 FL — SIGNIFICANT CHANGE UP (ref 80–100)
MONOCYTES # BLD AUTO: 1.97 K/UL — HIGH (ref 0–0.9)
MONOCYTES NFR BLD AUTO: 9.5 % — SIGNIFICANT CHANGE UP (ref 2–14)
NEUTROPHILS # BLD AUTO: 14.97 K/UL — HIGH (ref 1.8–7.4)
NEUTROPHILS NFR BLD AUTO: 72.7 % — SIGNIFICANT CHANGE UP (ref 43–77)
NRBC # BLD: 0 /100 WBCS — SIGNIFICANT CHANGE UP (ref 0–0)
PHOSPHATE SERPL-MCNC: 3 MG/DL — SIGNIFICANT CHANGE UP (ref 2.5–4.5)
PLATELET # BLD AUTO: 211 K/UL — SIGNIFICANT CHANGE UP (ref 150–400)
POTASSIUM SERPL-MCNC: 3.8 MMOL/L — SIGNIFICANT CHANGE UP (ref 3.5–5.3)
POTASSIUM SERPL-SCNC: 3.8 MMOL/L — SIGNIFICANT CHANGE UP (ref 3.5–5.3)
RBC # BLD: 3.6 M/UL — LOW (ref 3.8–5.2)
RBC # FLD: 15.7 % — HIGH (ref 10.3–14.5)
SODIUM SERPL-SCNC: 132 MMOL/L — LOW (ref 135–145)
T4 FREE SERPL-MCNC: 1.1 NG/DL — SIGNIFICANT CHANGE UP (ref 0.9–1.8)
WBC # BLD: 20.64 K/UL — HIGH (ref 3.8–10.5)
WBC # FLD AUTO: 20.64 K/UL — HIGH (ref 3.8–10.5)

## 2024-10-10 RX ORDER — INSULIN GLARGINE 300 U/ML
26 INJECTION, SOLUTION SUBCUTANEOUS AT BEDTIME
Refills: 0 | Status: DISCONTINUED | OUTPATIENT
Start: 2024-10-10 | End: 2024-10-11

## 2024-10-10 RX ORDER — METOCLOPRAMIDE HCL 5 MG
5 TABLET ORAL EVERY 8 HOURS
Refills: 0 | Status: DISCONTINUED | OUTPATIENT
Start: 2024-10-10 | End: 2024-10-11

## 2024-10-10 RX ORDER — METOPROLOL TARTRATE 50 MG
25 TABLET ORAL DAILY
Refills: 0 | Status: DISCONTINUED | OUTPATIENT
Start: 2024-10-10 | End: 2024-10-10

## 2024-10-10 RX ORDER — SORBITOL SOLUTION 70 %
30 SOLUTION, ORAL MISCELLANEOUS ONCE
Refills: 0 | Status: COMPLETED | OUTPATIENT
Start: 2024-10-10 | End: 2024-10-10

## 2024-10-10 RX ORDER — POTASSIUM BICARBONATE 100 %
25 POWDER (GRAM) MISCELLANEOUS
Refills: 0 | Status: COMPLETED | OUTPATIENT
Start: 2024-10-10 | End: 2024-10-10

## 2024-10-10 RX ORDER — BISACODYL 5 MG/1
10 TABLET, COATED ORAL ONCE
Refills: 0 | Status: COMPLETED | OUTPATIENT
Start: 2024-10-10 | End: 2024-10-10

## 2024-10-10 RX ORDER — METOPROLOL TARTRATE 50 MG
12.5 TABLET ORAL
Refills: 0 | Status: DISCONTINUED | OUTPATIENT
Start: 2024-10-10 | End: 2024-10-12

## 2024-10-10 RX ORDER — METOCLOPRAMIDE HCL 5 MG
10 TABLET ORAL EVERY 8 HOURS
Refills: 0 | Status: DISCONTINUED | OUTPATIENT
Start: 2024-10-10 | End: 2024-10-10

## 2024-10-10 RX ADMIN — FUROSEMIDE 40 MILLIGRAM(S): 10 INJECTION INTRAVENOUS at 17:54

## 2024-10-10 RX ADMIN — OXYCODONE HYDROCHLORIDE 5 MILLIGRAM(S): 30 TABLET, FILM COATED, EXTENDED RELEASE ORAL at 02:04

## 2024-10-10 RX ADMIN — Medication 5 MILLIGRAM(S): at 14:20

## 2024-10-10 RX ADMIN — INSULIN GLARGINE 26 UNIT(S): 300 INJECTION, SOLUTION SUBCUTANEOUS at 21:55

## 2024-10-10 RX ADMIN — CHLORHEXIDINE GLUCONATE ORAL RINSE 1 APPLICATION(S): 1.2 SOLUTION DENTAL at 10:21

## 2024-10-10 RX ADMIN — Medication 12.5 MILLIGRAM(S): at 17:53

## 2024-10-10 RX ADMIN — Medication 75 MICROGRAM(S): at 05:25

## 2024-10-10 RX ADMIN — Medication 25 MILLIEQUIVALENT(S): at 11:34

## 2024-10-10 RX ADMIN — OXYCODONE HYDROCHLORIDE 5 MILLIGRAM(S): 30 TABLET, FILM COATED, EXTENDED RELEASE ORAL at 08:10

## 2024-10-10 RX ADMIN — Medication 10 UNIT(S): at 12:20

## 2024-10-10 RX ADMIN — Medication 30 MILLILITER(S): at 10:12

## 2024-10-10 RX ADMIN — AMIODARONE HYDROCHLORIDE 400 MILLIGRAM(S): 50 INJECTION, SOLUTION INTRAVENOUS at 05:25

## 2024-10-10 RX ADMIN — OXYCODONE HYDROCHLORIDE 5 MILLIGRAM(S): 30 TABLET, FILM COATED, EXTENDED RELEASE ORAL at 07:46

## 2024-10-10 RX ADMIN — IPRATROPIUM BROMIDE AND ALBUTEROL SULFATE 3 MILLILITER(S): .5; 3 SOLUTION RESPIRATORY (INHALATION) at 05:25

## 2024-10-10 RX ADMIN — Medication 5000 UNIT(S): at 05:25

## 2024-10-10 RX ADMIN — Medication 10 UNIT(S): at 07:45

## 2024-10-10 RX ADMIN — FUROSEMIDE 40 MILLIGRAM(S): 10 INJECTION INTRAVENOUS at 05:25

## 2024-10-10 RX ADMIN — PIPERACILLIN SODIUM AND TAZOBACTAM SODIUM 25 GRAM(S): 12; 1.5 INJECTION, POWDER, LYOPHILIZED, FOR SOLUTION INTRAVENOUS at 21:55

## 2024-10-10 RX ADMIN — OXYCODONE HYDROCHLORIDE 5 MILLIGRAM(S): 30 TABLET, FILM COATED, EXTENDED RELEASE ORAL at 03:04

## 2024-10-10 RX ADMIN — AMIODARONE HYDROCHLORIDE 400 MILLIGRAM(S): 50 INJECTION, SOLUTION INTRAVENOUS at 21:56

## 2024-10-10 RX ADMIN — Medication 5 MILLIGRAM(S): at 22:51

## 2024-10-10 RX ADMIN — SPIRONOLACTONE 25 MILLIGRAM(S): 100 TABLET, FILM COATED ORAL at 05:25

## 2024-10-10 RX ADMIN — Medication 5 MILLIGRAM(S): at 21:56

## 2024-10-10 RX ADMIN — Medication 25 MILLIEQUIVALENT(S): at 10:12

## 2024-10-10 RX ADMIN — PIPERACILLIN SODIUM AND TAZOBACTAM SODIUM 25 GRAM(S): 12; 1.5 INJECTION, POWDER, LYOPHILIZED, FOR SOLUTION INTRAVENOUS at 05:24

## 2024-10-10 RX ADMIN — Medication 5000 UNIT(S): at 17:56

## 2024-10-10 NOTE — DIETITIAN INITIAL EVALUATION ADULT - PERTINENT LABORATORY DATA
10-10    132[L]  |  91[L]  |  25[H]  ----------------------------<  97  3.8   |  30  |  1.05    Ca    8.3[L]      10 Oct 2024 04:50  Phos  3.0     10-10  Mg     2.3     10-10    TPro  6.4  /  Alb  3.5  /  TBili  1.5[H]  /  DBili  x   /  AST  138[H]  /  ALT  250[H]  /  AlkPhos  105  10-09  POCT Blood Glucose.: 116 mg/dL (10-10-24 @ 11:43)  A1C with Estimated Average Glucose Result: 6.8 % (10-01-24 @ 03:02)

## 2024-10-10 NOTE — DIETITIAN INITIAL EVALUATION ADULT - REASON INDICATOR FOR ASSESSMENT
Length of stay  Source: chart, patient,  Isai #[id number] for Yoruba translation, NP  Chart reviewed, events noted

## 2024-10-10 NOTE — DIETITIAN INITIAL EVALUATION ADULT - PERTINENT MEDS FT
MEDICATIONS  (STANDING):  albuterol/ipratropium for Nebulization 3 milliLiter(s) Nebulizer every 8 hours  aMIOdarone    Tablet   Oral   aMIOdarone    Tablet 400 milliGRAM(s) Oral every 8 hours  aspirin enteric coated 81 milliGRAM(s) Oral daily  bisacodyl Suppository 10 milliGRAM(s) Rectal once  chlorhexidine 2% Cloths 1 Application(s) Topical daily  dextrose 50% Injectable 50 milliLiter(s) IV Push every 15 minutes  furosemide   Injectable 40 milliGRAM(s) IV Push every 12 hours  heparin   Injectable 5000 Unit(s) SubCutaneous every 12 hours  insulin glargine Injectable (LANTUS) 26 Unit(s) SubCutaneous at bedtime  insulin lispro (ADMELOG) corrective regimen sliding scale   SubCutaneous three times a day before meals  insulin lispro (ADMELOG) corrective regimen sliding scale   SubCutaneous at bedtime  insulin lispro Injectable (ADMELOG) 10 Unit(s) SubCutaneous three times a day before meals  levothyroxine 75 MICROGram(s) Oral daily  melatonin 5 milliGRAM(s) Oral at bedtime  metoclopramide Injectable 5 milliGRAM(s) IV Push every 8 hours  metoprolol tartrate 12.5 milliGRAM(s) Oral two times a day  piperacillin/tazobactam IVPB.. 3.375 Gram(s) IV Intermittent every 8 hours  polyethylene glycol 3350 17 Gram(s) Oral daily  senna 2 Tablet(s) Oral at bedtime  sodium chloride 0.9%. 1000 milliLiter(s) (10 mL/Hr) IV Continuous <Continuous>  spironolactone 25 milliGRAM(s) Oral daily    MEDICATIONS  (PRN):  oxyCODONE    IR 5 milliGRAM(s) Oral every 4 hours PRN Moderate Pain (4 - 6)  oxyCODONE    IR 10 milliGRAM(s) Oral every 4 hours PRN Severe Pain (7 - 10)

## 2024-10-10 NOTE — PROGRESS NOTE ADULT - ASSESSMENT
75 y/o female w/ PMH of hypothyroidism, CVA x2 (2018), HTN, HLD and DM on home insulins, now s/p cabg    Assessment  DMT2: 74y Female with DM T2 with hyperglycemia, A1C 6.8% , was on basal insulin at home, now on basal bolus insulin with coverage, blood sugars running high. IV insulin restarted briefly, basal bolus insulins increased. Now glucose improving.   Will need tight glycemic control for postop wound healing.   CAD: on medications, stable, monitored. s/p cabg   Hypothyroidism: On Synthroid 75 mcg po daily, compliant with Synthroid intake, asymptomatic. TFTs euthyroid, FT4 1.9 , rpt FT4   HTN: on antihypertensive medications, monitored, asymptomatic.      Discussed plan and management wit Dr Kandace Jeronimo MD  Cell: [phone]  Office: [phone]             75 y/o female w/ PMH of hypothyroidism, CVA x2 (2018), HTN, HLD and DM on home insulins, now s/p cabg    Assessment  DMT2: 74y Female with DM T2 with hyperglycemia, A1C 6.8% , was on basal insulin at home, now on basal bolus insulin with coverage, blood sugars running high. IV insulin restarted briefly, basal bolus insulins increased. Now glucose improving.   Will need tight glycemic control for postop wound healing.   CAD: on medications, stable, monitored. s/p cabg   Hypothyroidism: On Synthroid 75 mcg po daily, compliant with Synthroid intake, asymptomatic. TFTs euthyroid, FT4 1.9 , rpt FT4   HTN: on antihypertensive medications, monitored, asymptomatic.        Discussed plan and management wit Dr Kandace Jeronimo MD  Cell: [phone]  Office: [phone]

## 2024-10-10 NOTE — DIETITIAN INITIAL EVALUATION ADULT - NS FNS DIET ORDER
Diet, Regular:   Consistent Carbohydrate {No Snacks} (CSTCHO)  1000mL Fluid Restriction ([id number]) (10-09-24 @ 02:14) [Active]

## 2024-10-10 NOTE — DIETITIAN INITIAL EVALUATION ADULT - REASON
Patient w/ recent N/V just before arrival, still felt mildly unwell being addressed by team w/ reglan

## 2024-10-10 NOTE — DIETITIAN INITIAL EVALUATION ADULT - ENERGY INTAKE
Patient endorsing poor PO intake/appetite ongoing, and endorses new swallowing impairment. Never had issues PTA per patient. Endorses having no issues chewing food items but states when it comes to swallowing food/liquids she has difficulty. Recommend SLP consult to assess patient's current chewing/swallowing capacity. Reviewed oral nutrition supplementation with patient; patient declines at this time as she states 'they give me constipation'. Patient was amenable to trial 2x protein sources with meals for extra protein/caloric support. Patient additionally reports new issues w/ N/V, having had episode of N/V just before seeing the patient. Ordered for reglan noted. Poor (<50%)

## 2024-10-10 NOTE — DIETITIAN INITIAL EVALUATION ADULT - OTHER INFO
NKFA per patient. Patient's height on file is 5'9"; patient reports her height to be 5'4". Patient reported her UBW is 169lbs PTA w/ no recent weight fluctuations. Only recent weight by [facility] growth chart is 167lbs on 9/30/2024. Will follow weight trend.    - Hyponatremia noted. 1000cc fluid restriction in place by team.  - Prior hypophosphatemia, last 10/7, WNL as of afternoon 10/7 lab draw.  - HgbA1C 6.8% (10/1); patient w/ h/o DM PTA per chart. Lantus, SS & bolus admelog ordered.  - Ordered for PO synthroid, lasix, aldactone.

## 2024-10-10 NOTE — PROGRESS NOTE ADULT - ASSESSMENT
73 y/o female w/ PMH of hypothyroidism, CVA x2 (2018), HTN, HLD and DM who initially p/t [facility] w/ worsening back pain, SOB, N/V and CP. She was transferred as a Tier I w/ c/f NSTEMI and hypotension (SBP 80s). Her son was at bedside and says she it typically mobile with her walker and has never had such back pain or CP. ECG with ST depressions I, II, aVL, V2-V5 and ST elevations aVR, III, V1. She was ASA and brilinta and heparin loaded at OSH. Pt. underwent R/LHC found to have severe LM and LAD disease and moderate pRCA disease. IABP placed for coronary perfusion and admitted to CICU for CABG eval. On arrival to unit pt. endorses nausea and had one episode of emesis relieved w/ antiemetics. (01 Oct 2024 03:06)  10/3/24 CABG, with SAMUEL , LIMA to LAD, SVG to OM,SVG to Diag  AGNIESZKA Clip   , IABP ef 45  Labile hemodynamics  volume resuscitation  pressor + inotrope  Extubated d 1, iabp d/c   wean , decrease in MVO2, increasd  PAF, amio load  Insulin infusion  10/5  off since 11 AM 10/5- inotrope weaned - later noted to have elevated Lactate, rising LFTs, milrinone restarted, also concern for pulm congestion - bumex drip started    , LFTs in 1000's  Started on milrinone , Bumex gtt and  Amio  d/tameka.  statin on hold for lfts   TTE organized clot adjacent RVOT  Zosyn/vanco for leukocytosis, cultures neg  10/8  Transferred sdu  10/9  d/c milrinone d/c mar Cont iv diuretics D/c hagan in am tomorrow  10/10 VSS MTI dressing d/c  hagan removed at 0800  DTV 1800 ambulated  klyte 25x2 k 3.8 sorbitol for  BM ambulated disposition to rehab

## 2024-10-10 NOTE — PROGRESS NOTE ADULT - SUBJECTIVE AND OBJECTIVE BOX
Subjective : hello I am ok  no pain"    VITAL SIGNS    Telemetry:   NSR 70-90    Vital Signs Last 24 Hrs  T(C): 36.6 (10-10-24 @ 07:16), Max: 36.7 (10-09-24 @ 15:11)  T(F): 97.9 (10-10-24 @ 07:16), Max: 98.1 (10-09-24 @ 15:11)  HR: 77 (10-10-24 @ 07:16) (76 - 95)  BP: 121/58 (10-10-24 @ 07:16) (114/58 - 124/60)  RR: 18 (10-10-24 @ 07:16) (18 - 18)  SpO2: 94% (10-10-24 @ 07:16) (92% - 96%)           10-09 @ 07:01  -  10-10 @ 07:00  --------------------------------------------------------  IN: 1150 mL / OUT: 1810 mL / NET: -660 mL                        10.8   20.64 )-----------( 211      ( 10 Oct 2024 04:50 )             31.8       10-10    132[L]  |  91[L]  |  25[H]  ----------------------------<  97  3.8   |  30  |  1.05    Ca    8.3[L]      10 Oct 2024 04:50  Phos  3.0     10-10  Mg     2.3     10-10    TPro  6.4  /  Alb  3.5  /  TBili  1.5[H]  /  DBili  x   /  AST  138[H]  /  ALT  250[H]  /  AlkPhos  105  10-09      MEDICATIONS  (STANDING):  albuterol/ipratropium for Nebulization 3 milliLiter(s) Nebulizer every 8 hours  aMIOdarone    Tablet 400 milliGRAM(s) Oral every 8 hours  aMIOdarone    Tablet   Oral   aspirin enteric coated 81 milliGRAM(s) Oral daily  bisacodyl Suppository 10 milliGRAM(s) Rectal once  chlorhexidine 2% Cloths 1 Application(s) Topical daily  dextrose 50% Injectable 50 milliLiter(s) IV Push every 15 minutes  furosemide   Injectable 40 milliGRAM(s) IV Push every 12 hours  heparin   Injectable 5000 Unit(s) SubCutaneous every 12 hours  insulin glargine Injectable (LANTUS) 26 Unit(s) SubCutaneous at bedtime  insulin lispro (ADMELOG) corrective regimen sliding scale   SubCutaneous three times a day before meals  insulin lispro (ADMELOG) corrective regimen sliding scale   SubCutaneous at bedtime  insulin lispro Injectable (ADMELOG) 10 Unit(s) SubCutaneous three times a day before meals  levothyroxine 75 MICROGram(s) Oral daily  melatonin 5 milliGRAM(s) Oral at bedtime  piperacillin/tazobactam IVPB.. 3.375 Gram(s) IV Intermittent every 8 hours  polyethylene glycol 3350 17 Gram(s) Oral daily  senna 2 Tablet(s) Oral at bedtime  sodium chloride 0.9%. 1000 milliLiter(s) (10 mL/Hr) IV Continuous <Continuous>  spironolactone 25 milliGRAM(s) Oral daily    MEDICATIONS  (PRN):  oxyCODONE    IR 5 milliGRAM(s) Oral every 4 hours PRN Moderate Pain (4 - 6)  oxyCODONE    IR 10 milliGRAM(s) Oral every 4 hours PRN Severe Pain (7 - 10)      POCT Blood Glucose.: 98 mg/dL (10 Oct 2024 07:18)  POCT Blood Glucose.: 178 mg/dL (09 Oct 2024 20:48)  POCT Blood Glucose.: 215 mg/dL (09 Oct 2024 16:32)  POCT Blood Glucose.: 186 mg/dL (09 Oct 2024 13:55)  POCT Blood Glucose.: 197 mg/dL (09 Oct 2024 10:59)              Pacing Wires   x4     [x  ]   Settings:       vvi 40 vma 10                           Isolated  [  ]                              PHYSICAL EXAM        Neurology: alert and oriented x 3, nonfocal, no gross deficits    CV :[id number]    Sternal Wound : SHY sternum , Stable    Lungs: B/.l breath sounds' 2l nc  crackles in bases    Abdomen: soft, nontender, nondistended, positive bowel sounds, last bowel movement - needs bm    :   DTV hagan d.c 0800            Extremities:     warm well perfused equal strength throughout  B/ll e+ Dp + edema nocalftenderness                                       Physical Therapy Rec:   Home  [  ]   Home w/ PT  [  ]  Rehab  [  x]    Discussed with Cardiothoracic Team at AM rounds.

## 2024-10-10 NOTE — PROGRESS NOTE ADULT - SUBJECTIVE AND OBJECTIVE BOX
Chief complaint  Patient is a 74y old  Female who presents with a chief complaint of CP (10 Oct 2024 09:10)         Labs and Fingersticks  CAPILLARY BLOOD GLUCOSE      POCT Blood Glucose.: 116 mg/dL (10 Oct 2024 11:43)  POCT Blood Glucose.: 98 mg/dL (10 Oct 2024 07:18)  POCT Blood Glucose.: 178 mg/dL (09 Oct 2024 20:48)  POCT Blood Glucose.: 215 mg/dL (09 Oct 2024 16:32)      Anion Gap: 11 (10-10 @ 04:50)  Anion Gap: 12 (10-09 @ 06:22)  Anion Gap: 12 (10-08 @ 17:00)      Calcium: 8.3 *L* (10-10 @ 04:50)  Calcium: 8.2 *L* (10-09 @ 06:22)  Calcium: 8.5 (10-08 @ 17:00)  Albumin: 3.5 (10-09 @ 06:22)    Alanine Aminotransferase (ALT/SGPT): 250 *H* (10-09 @ 06:22)  Alkaline Phosphatase: 105 (10-09 @ 06:22)  Aspartate Aminotransferase (AST/SGOT): 138 *H* (10-09 @ 06:22)        10-10    132[L]  |  91[L]  |  25[H]  ----------------------------<  97  3.8   |  30  |  1.05    Ca    8.3[L]      10 Oct 2024 04:50  Phos  3.0     10-10  Mg     2.3     10-10    TPro  6.4  /  Alb  3.5  /  TBili  1.5[H]  /  DBili  x   /  AST  138[H]  /  ALT  250[H]  /  AlkPhos  105  10-09                        10.8   20.64 )-----------( 211      ( 10 Oct 2024 04:50 )             31.8     Medications  MEDICATIONS  (STANDING):  albuterol/ipratropium for Nebulization 3 milliLiter(s) Nebulizer every 8 hours  aMIOdarone    Tablet   Oral   aMIOdarone    Tablet 400 milliGRAM(s) Oral every 8 hours  aspirin enteric coated 81 milliGRAM(s) Oral daily  bisacodyl Suppository 10 milliGRAM(s) Rectal once  chlorhexidine 2% Cloths 1 Application(s) Topical daily  dextrose 50% Injectable 50 milliLiter(s) IV Push every 15 minutes  furosemide   Injectable 40 milliGRAM(s) IV Push every 12 hours  heparin   Injectable 5000 Unit(s) SubCutaneous every 12 hours  insulin glargine Injectable (LANTUS) 26 Unit(s) SubCutaneous at bedtime  insulin lispro (ADMELOG) corrective regimen sliding scale   SubCutaneous three times a day before meals  insulin lispro (ADMELOG) corrective regimen sliding scale   SubCutaneous at bedtime  insulin lispro Injectable (ADMELOG) 10 Unit(s) SubCutaneous three times a day before meals  levothyroxine 75 MICROGram(s) Oral daily  melatonin 5 milliGRAM(s) Oral at bedtime  metoclopramide Injectable 5 milliGRAM(s) IV Push every 8 hours  piperacillin/tazobactam IVPB.. 3.375 Gram(s) IV Intermittent every 8 hours  polyethylene glycol 3350 17 Gram(s) Oral daily  senna 2 Tablet(s) Oral at bedtime  sodium chloride 0.9%. 1000 milliLiter(s) (10 mL/Hr) IV Continuous <Continuous>  spironolactone 25 milliGRAM(s) Oral daily      Physical Exam  General: Patient comfortable in bed  Vital Signs Last 12 Hrs  T(F): 98 (10-10-24 @ 11:46), Max: 98 (10-10-24 @ 11:46)  HR: 81 (10-10-24 @ 11:46) (76 - 81)  BP: 115/57 (10-10-24 @ 11:46) (114/58 - 121/58)  BP(mean): 82 (10-10-24 @ 11:46) (81 - 83)  RR: 18 (10-10-24 @ 11:46) (18 - 18)  SpO2: 94% (10-10-24 @ 11:46) (93% - 95%)    CVS: S1S2   Respiratory: No wheezing, no crepitations  GI: Abdomen soft, bowel sounds positive  Musculoskeletal:  moves all extremities  : Voiding       Chief complaint  Patient is a 74y old  Female who presents with a chief complaint of CP (10 Oct 2024 09:10)         Labs and Fingersticks  CAPILLARY BLOOD GLUCOSE      POCT Blood Glucose.: 116 mg/dL (10 Oct 2024 11:43)  POCT Blood Glucose.: 98 mg/dL (10 Oct 2024 07:18)  POCT Blood Glucose.: 178 mg/dL (09 Oct 2024 20:48)  POCT Blood Glucose.: 215 mg/dL (09 Oct 2024 16:32)      Anion Gap: 11 (10-10 @ 04:50)  Anion Gap: 12 (10-09 @ 06:22)  Anion Gap: 12 (10-08 @ 17:00)      Calcium: 8.3 *L* (10-10 @ 04:50)  Calcium: 8.2 *L* (10-09 @ 06:22)  Calcium: 8.5 (10-08 @ 17:00)  Albumin: 3.5 (10-09 @ 06:22)    Alanine Aminotransferase (ALT/SGPT): 250 *H* (10-09 @ 06:22)  Alkaline Phosphatase: 105 (10-09 @ 06:22)  Aspartate Aminotransferase (AST/SGOT): 138 *H* (10-09 @ 06:22)        10-10    132[L]  |  91[L]  |  25[H]  ----------------------------<  97  3.8   |  30  |  1.05    Ca    8.3[L]      10 Oct 2024 04:50  Phos  3.0     10-10  Mg     2.3     10-10    TPro  6.4  /  Alb  3.5  /  TBili  1.5[H]  /  DBili  x   /  AST  138[H]  /  ALT  250[H]  /  AlkPhos  105  10-09                        10.8   20.64 )-----------( 211      ( 10 Oct 2024 04:50 )             31.8     Medications  MEDICATIONS  (STANDING):  albuterol/ipratropium for Nebulization 3 milliLiter(s) Nebulizer every 8 hours  aMIOdarone    Tablet   Oral   aMIOdarone    Tablet 400 milliGRAM(s) Oral every 8 hours  aspirin enteric coated 81 milliGRAM(s) Oral daily  bisacodyl Suppository 10 milliGRAM(s) Rectal once  chlorhexidine 2% Cloths 1 Application(s) Topical daily  dextrose 50% Injectable 50 milliLiter(s) IV Push every 15 minutes  furosemide   Injectable 40 milliGRAM(s) IV Push every 12 hours  heparin   Injectable 5000 Unit(s) SubCutaneous every 12 hours  insulin glargine Injectable (LANTUS) 26 Unit(s) SubCutaneous at bedtime  insulin lispro (ADMELOG) corrective regimen sliding scale   SubCutaneous three times a day before meals  insulin lispro (ADMELOG) corrective regimen sliding scale   SubCutaneous at bedtime  insulin lispro Injectable (ADMELOG) 10 Unit(s) SubCutaneous three times a day before meals  levothyroxine 75 MICROGram(s) Oral daily  melatonin 5 milliGRAM(s) Oral at bedtime  metoclopramide Injectable 5 milliGRAM(s) IV Push every 8 hours  piperacillin/tazobactam IVPB.. 3.375 Gram(s) IV Intermittent every 8 hours  polyethylene glycol 3350 17 Gram(s) Oral daily  senna 2 Tablet(s) Oral at bedtime  sodium chloride 0.9%. 1000 milliLiter(s) (10 mL/Hr) IV Continuous <Continuous>  spironolactone 25 milliGRAM(s) Oral daily      Physical Exam  General: Patient comfortable in bed  Vital Signs Last 12 Hrs  T(F): 98 (10-10-24 @ 11:46), Max: 98 (10-10-24 @ 11:46)  HR: 81 (10-10-24 @ 11:46) (76 - 81)  BP: 115/57 (10-10-24 @ 11:46) (114/58 - 121/58)  BP(mean): 82 (10-10-24 @ 11:46) (81 - 83)  RR: 18 (10-10-24 @ 11:46) (18 - 18)  SpO2: 94% (10-10-24 @ 11:46) (93% - 95%)    CVS: S1S2   Respiratory: No wheezing, no crepitations  GI: Abdomen soft, bowel sounds positive  Musculoskeletal:  moves all extremities  : Voiding

## 2024-10-10 NOTE — DIETITIAN INITIAL EVALUATION ADULT - ORAL INTAKE PTA/DIET HISTORY
Patient reports she normally eats well PTA but had reduced PO intake/appetite for 3-4 days leading into admission. No chewing/swallowing impairment PTA prior to onset of acute issues (h/o CVA x2 noted), no prior issues w/ N/V. Patient reports she checks her blood glucose at home, noting that she has been having downtrending blood glucose leading into admission. When asked for clarification of how patient's blood glucose control was prior to decreased PO intake/appetite, patient kept responding "I don't understand the question" after rephrasing with  several times. Patient declined following any therapeutic diet at home, does not modify/watch carbohydrate intake for BG control or any heart healthy diet principles.

## 2024-10-10 NOTE — DIETITIAN NUTRITION RISK NOTIFICATION - TREATMENT: THE FOLLOWING DIET HAS BEEN RECOMMENDED
Diet, Regular:   Consistent Carbohydrate {No Snacks} (CSTCHO)  1000mL Fluid Restriction ([id number]) (10-09-24 @ 02:14) [Active]

## 2024-10-10 NOTE — DIETITIAN INITIAL EVALUATION ADULT - REASON FOR ADMISSION
Acute myocardial infarction    Per chart, patient is a 73 y/o female with PMH including hypothyroidism, h/o CVA x2 (2018), HTN, HLD, DM. Patient presents to [facility] as a transfer from [facility] for CABG evaluation. S/p CABGx3 10/3.

## 2024-10-10 NOTE — PROGRESS NOTE ADULT - PROBLEM SELECTOR PLAN 1
s/p CABG 3 AGNIESZKA IABP on 10/3   Asa, 81    amiodarone load    diurese with  lasix 40 iv q12  aldactone 25 qd    Statin  on hold for lft's,   B-blocker when weaned off inotropes.,   primacor at 0.125mcg/kg/min d/c 10/9   Chest PT,  Incentive spirometry, wound care and assessment.  Ambulate .  dispotiom to rehab

## 2024-10-10 NOTE — DIETITIAN INITIAL EVALUATION ADULT - ADD RECOMMEND
1. patient w/ new reported swallowing difficulty during admission w/ ongoing poor PO intake; recommend SLP consult to help assess patient's current chewing/swallowing capacity  2. provide assistance with meals for the patient as needed  3. reviewed oral nutrition supplements: patient currently deferring due to patient reporting they give her constipation when she has them  4. patient amenable to try 2x protein source with meals for extra caloric/protein support  5. monitor PO intake, weight trend, electrolytes, blood glucose levels, labs, BMs

## 2024-10-10 NOTE — DIETITIAN INITIAL EVALUATION ADULT - PERSON TAUGHT/METHOD
with  Isai #[id number] for Maltese translation/adequate caloric/protein intake w/ food sources reviewed, food preferences, introduction of consistent carbohydrate/heart healthy diet guidelines w/ M[place]ese literature provided as reference (patient would benefit from follow up diet education reinforcement d/t reported N/V just before visit and was not entirely focused on nutrition education when reviewing)/written material/patient instructed

## 2024-10-11 LAB
ALBUMIN SERPL ELPH-MCNC: 3.6 G/DL — SIGNIFICANT CHANGE UP (ref 3.3–5)
ALP SERPL-CCNC: 103 U/L — SIGNIFICANT CHANGE UP (ref 40–120)
ALT FLD-CCNC: 138 U/L — HIGH (ref 10–45)
ANION GAP SERPL CALC-SCNC: 11 MMOL/L — SIGNIFICANT CHANGE UP (ref 5–17)
APPEARANCE UR: CLEAR — SIGNIFICANT CHANGE UP
AST SERPL-CCNC: 44 U/L — HIGH (ref 10–40)
BILIRUB SERPL-MCNC: 1.8 MG/DL — HIGH (ref 0.2–1.2)
BILIRUB UR-MCNC: NEGATIVE — SIGNIFICANT CHANGE UP
BUN SERPL-MCNC: 24 MG/DL — HIGH (ref 7–23)
CALCIUM SERPL-MCNC: 8.7 MG/DL — SIGNIFICANT CHANGE UP (ref 8.4–10.5)
CHLORIDE SERPL-SCNC: 89 MMOL/L — LOW (ref 96–108)
CO2 SERPL-SCNC: 35 MMOL/L — HIGH (ref 22–31)
COLOR SPEC: YELLOW — SIGNIFICANT CHANGE UP
CREAT SERPL-MCNC: 1.16 MG/DL — SIGNIFICANT CHANGE UP (ref 0.5–1.3)
CULTURE RESULTS: SIGNIFICANT CHANGE UP
CULTURE RESULTS: SIGNIFICANT CHANGE UP
DIFF PNL FLD: NEGATIVE — SIGNIFICANT CHANGE UP
EGFR: 49 ML/MIN/1.73M2 — LOW
ERYTHROCYTE [SEDIMENTATION RATE] IN BLOOD: 46 MM/HR — HIGH (ref 0–20)
GLUCOSE BLDC GLUCOMTR-MCNC: 120 MG/DL — HIGH (ref 70–99)
GLUCOSE BLDC GLUCOMTR-MCNC: 199 MG/DL — HIGH (ref 70–99)
GLUCOSE BLDC GLUCOMTR-MCNC: 64 MG/DL — LOW (ref 70–99)
GLUCOSE BLDC GLUCOMTR-MCNC: 65 MG/DL — LOW (ref 70–99)
GLUCOSE BLDC GLUCOMTR-MCNC: 69 MG/DL — LOW (ref 70–99)
GLUCOSE BLDC GLUCOMTR-MCNC: 73 MG/DL — SIGNIFICANT CHANGE UP (ref 70–99)
GLUCOSE BLDC GLUCOMTR-MCNC: 80 MG/DL — SIGNIFICANT CHANGE UP (ref 70–99)
GLUCOSE SERPL-MCNC: 65 MG/DL — LOW (ref 70–99)
GLUCOSE UR QL: NEGATIVE MG/DL — SIGNIFICANT CHANGE UP
HCT VFR BLD CALC: 34.1 % — LOW (ref 34.5–45)
HGB BLD-MCNC: 11.1 G/DL — LOW (ref 11.5–15.5)
KETONES UR-MCNC: NEGATIVE MG/DL — SIGNIFICANT CHANGE UP
LEUKOCYTE ESTERASE UR-ACNC: NEGATIVE — SIGNIFICANT CHANGE UP
MAGNESIUM SERPL-MCNC: 2.2 MG/DL — SIGNIFICANT CHANGE UP (ref 1.6–2.6)
MCHC RBC-ENTMCNC: 29 PG — SIGNIFICANT CHANGE UP (ref 27–34)
MCHC RBC-ENTMCNC: 32.6 GM/DL — SIGNIFICANT CHANGE UP (ref 32–36)
MCV RBC AUTO: 89 FL — SIGNIFICANT CHANGE UP (ref 80–100)
NITRITE UR-MCNC: NEGATIVE — SIGNIFICANT CHANGE UP
NRBC # BLD: 0 /100 WBCS — SIGNIFICANT CHANGE UP (ref 0–0)
PH UR: 7 — SIGNIFICANT CHANGE UP (ref 5–8)
PHOSPHATE SERPL-MCNC: 3.2 MG/DL — SIGNIFICANT CHANGE UP (ref 2.5–4.5)
PLATELET # BLD AUTO: 272 K/UL — SIGNIFICANT CHANGE UP (ref 150–400)
POTASSIUM SERPL-MCNC: 3.9 MMOL/L — SIGNIFICANT CHANGE UP (ref 3.5–5.3)
POTASSIUM SERPL-SCNC: 3.9 MMOL/L — SIGNIFICANT CHANGE UP (ref 3.5–5.3)
PROCALCITONIN SERPL-MCNC: 0.2 NG/ML — HIGH (ref 0.02–0.1)
PROT SERPL-MCNC: 7.2 G/DL — SIGNIFICANT CHANGE UP (ref 6–8.3)
PROT UR-MCNC: NEGATIVE MG/DL — SIGNIFICANT CHANGE UP
RBC # BLD: 3.83 M/UL — SIGNIFICANT CHANGE UP (ref 3.8–5.2)
RBC # FLD: 16.1 % — HIGH (ref 10.3–14.5)
SODIUM SERPL-SCNC: 135 MMOL/L — SIGNIFICANT CHANGE UP (ref 135–145)
SP GR SPEC: 1.01 — SIGNIFICANT CHANGE UP (ref 1–1.03)
SPECIMEN SOURCE: SIGNIFICANT CHANGE UP
SPECIMEN SOURCE: SIGNIFICANT CHANGE UP
UROBILINOGEN FLD QL: 1 MG/DL — SIGNIFICANT CHANGE UP (ref 0.2–1)
WBC # BLD: 19.18 K/UL — HIGH (ref 3.8–10.5)
WBC # FLD AUTO: 19.18 K/UL — HIGH (ref 3.8–10.5)

## 2024-10-11 PROCEDURE — 71045 X-RAY EXAM CHEST 1 VIEW: CPT | Mod: 26

## 2024-10-11 RX ORDER — INSULIN LISPRO 100/ML
7 VIAL (ML) SUBCUTANEOUS
Refills: 0 | Status: DISCONTINUED | OUTPATIENT
Start: 2024-10-11 | End: 2024-10-12

## 2024-10-11 RX ORDER — INSULIN GLARGINE 300 U/ML
18 INJECTION, SOLUTION SUBCUTANEOUS AT BEDTIME
Refills: 0 | Status: DISCONTINUED | OUTPATIENT
Start: 2024-10-11 | End: 2024-10-12

## 2024-10-11 RX ORDER — FUROSEMIDE 10 MG/ML
40 INJECTION INTRAVENOUS
Refills: 0 | Status: DISCONTINUED | OUTPATIENT
Start: 2024-10-11 | End: 2024-10-14

## 2024-10-11 RX ADMIN — IPRATROPIUM BROMIDE AND ALBUTEROL SULFATE 3 MILLILITER(S): .5; 3 SOLUTION RESPIRATORY (INHALATION) at 13:03

## 2024-10-11 RX ADMIN — FUROSEMIDE 40 MILLIGRAM(S): 10 INJECTION INTRAVENOUS at 13:02

## 2024-10-11 RX ADMIN — PIPERACILLIN SODIUM AND TAZOBACTAM SODIUM 25 GRAM(S): 12; 1.5 INJECTION, POWDER, LYOPHILIZED, FOR SOLUTION INTRAVENOUS at 05:14

## 2024-10-11 RX ADMIN — Medication 7 UNIT(S): at 17:21

## 2024-10-11 RX ADMIN — Medication 5000 UNIT(S): at 05:10

## 2024-10-11 RX ADMIN — SPIRONOLACTONE 25 MILLIGRAM(S): 100 TABLET, FILM COATED ORAL at 05:09

## 2024-10-11 RX ADMIN — AMIODARONE HYDROCHLORIDE 400 MILLIGRAM(S): 50 INJECTION, SOLUTION INTRAVENOUS at 05:09

## 2024-10-11 RX ADMIN — Medication 12.5 MILLIGRAM(S): at 17:21

## 2024-10-11 RX ADMIN — Medication 81 MILLIGRAM(S): at 13:03

## 2024-10-11 RX ADMIN — FUROSEMIDE 40 MILLIGRAM(S): 10 INJECTION INTRAVENOUS at 05:10

## 2024-10-11 RX ADMIN — AMIODARONE HYDROCHLORIDE 400 MILLIGRAM(S): 50 INJECTION, SOLUTION INTRAVENOUS at 21:28

## 2024-10-11 RX ADMIN — CHLORHEXIDINE GLUCONATE ORAL RINSE 1 APPLICATION(S): 1.2 SOLUTION DENTAL at 11:43

## 2024-10-11 RX ADMIN — Medication 5 MILLIGRAM(S): at 21:28

## 2024-10-11 RX ADMIN — IPRATROPIUM BROMIDE AND ALBUTEROL SULFATE 3 MILLILITER(S): .5; 3 SOLUTION RESPIRATORY (INHALATION) at 05:14

## 2024-10-11 RX ADMIN — Medication 5000 UNIT(S): at 17:21

## 2024-10-11 RX ADMIN — Medication 5 MILLIGRAM(S): at 05:10

## 2024-10-11 RX ADMIN — Medication 12.5 MILLIGRAM(S): at 05:09

## 2024-10-11 RX ADMIN — AMIODARONE HYDROCHLORIDE 400 MILLIGRAM(S): 50 INJECTION, SOLUTION INTRAVENOUS at 13:03

## 2024-10-11 RX ADMIN — Medication 75 MICROGRAM(S): at 05:10

## 2024-10-11 RX ADMIN — IPRATROPIUM BROMIDE AND ALBUTEROL SULFATE 3 MILLILITER(S): .5; 3 SOLUTION RESPIRATORY (INHALATION) at 21:28

## 2024-10-11 RX ADMIN — PIPERACILLIN SODIUM AND TAZOBACTAM SODIUM 25 GRAM(S): 12; 1.5 INJECTION, POWDER, LYOPHILIZED, FOR SOLUTION INTRAVENOUS at 21:27

## 2024-10-11 RX ADMIN — INSULIN GLARGINE 18 UNIT(S): 300 INJECTION, SOLUTION SUBCUTANEOUS at 21:28

## 2024-10-11 RX ADMIN — PIPERACILLIN SODIUM AND TAZOBACTAM SODIUM 25 GRAM(S): 12; 1.5 INJECTION, POWDER, LYOPHILIZED, FOR SOLUTION INTRAVENOUS at 13:03

## 2024-10-11 RX ADMIN — Medication 2: at 17:20

## 2024-10-11 NOTE — SWALLOW BEDSIDE ASSESSMENT ADULT - SWALLOW EVAL: DIAGNOSIS
Patient presents with overtly functional oropharyngeal swallow skills. Mastication slightly prolonged due to edentulous state, but overall functional. Oral transport adequate. Pt with c/o inability to swallow, but with encouragement triggered pharyngeal swallow with no overt s/s of laryngeal penetration/aspiration across all consistencies administered. Suspect poor appetite is negatively impacting oral intake.

## 2024-10-11 NOTE — SWALLOW BEDSIDE ASSESSMENT ADULT - SWALLOW EVAL: PATIENT/FAMILY GOALS STATEMENT
Pt reported she has not has teeth for ~6 months, but was able to chew solids without issues. Endorsed that inability to swallow started 2 days before hospitalization, accompanied by poor appetite. Denied coughing and choking during PO intake. Stated she will chew solids and then spit them out before initiating the swallow.

## 2024-10-11 NOTE — PROGRESS NOTE ADULT - ASSESSMENT
75 y/o female w/ PMH of hypothyroidism, CVA x2 (2018), HTN, HLD and DM on home insulins, now s/p cabg    Assessment  DMT2: 74y Female with DM T2 with hyperglycemia, A1C 6.8% , was on basal insulin at home, now on basal bolus insulin with coverage, blood sugars improving, had hypoglycemic event.  Will need tight glycemic control for postop wound healing.   CAD: on medications, stable, monitored. s/p cabg   Hypothyroidism: On Synthroid 75 mcg po daily, compliant with Synthroid intake, asymptomatic. TFTs euthyroid, FT4 1.9 , rpt FT4   HTN: on antihypertensive medications, monitored, asymptomatic.          Rachid Jeronimo MD  Cell: [phone]  Office: [phone]

## 2024-10-11 NOTE — PROVIDER CONTACT NOTE (HYPOGLYCEMIA EVENT) - NS PROVIDER CONTACT BACKGROUND-HYPO
Age: 74y    Gender: Female    POCT Blood Glucose:  80 mg/dL (10-11-24 @ 12:13)  65 mg/dL (10-11-24 @ 11:47)  64 mg/dL (10-11-24 @ 11:46)  73 mg/dL (10-11-24 @ 07:30)  69 mg/dL (10-11-24 @ 07:29)  137 mg/dL (10-10-24 @ 21:01)  126 mg/dL (10-10-24 @ 16:45)      eMAR:  insulin glargine Injectable (LANTUS)   26 Unit(s) SubCutaneous (10-10-24 @ 21:55)    insulin lispro Injectable (ADMELOG)   10 Unit(s) SubCutaneous (10-10-24 @ 12:20)    levothyroxine   75 MICROGram(s) Oral (10-11-24 @ 05:10)

## 2024-10-11 NOTE — PROVIDER CONTACT NOTE (HYPOGLYCEMIA EVENT) - NS PROVIDER CONTACT RECOMMEND-HYPO
8oz cranberry juice given. Patient ate 25% mashed potatoes, refusing to eat lunch.   Recheck FS 80, NP notified that patient does not want to finish lunch. As per NP Jillian Cárdenas, FS 80 ok.

## 2024-10-11 NOTE — PROGRESS NOTE ADULT - ASSESSMENT
75 y/o female w/ PMH of hypothyroidism, CVA x2 (2018), HTN, HLD and DM who initially p/t [place] w/ worsening back pain, SOB, N/V and CP. She was transferred as a Tier I w/ c/f NSTEMI and hypotension (SBP 80s). Her son was at bedside and says she it typically mobile with her walker and has never had such back pain or CP. ECG with ST depressions I, II, aVL, V2-V5 and ST elevations aVR, III, V1. She was ASA and brilinta and heparin loaded at OSH. Pt. underwent R/LHC found to have severe LM and LAD disease and moderate pRCA disease. IABP placed for coronary perfusion and admitted to CICU for CABG eval. On arrival to unit pt. endorses nausea and had one episode of emesis relieved w/ antiemetics. (01 Oct 2024 03:06)  10/3/24 CABG, with SAMUEL , LIMA to LAD, SVG to OM,SVG to Diag  AGNIESZKA Clip   , IABP ef 45  Labile hemodynamics  volume resuscitation  pressor + inotrope  Extubated d 1, iabp d/c   wean , decrease in MVO2, increasd  PAF, amio load  Insulin infusion  10/5  off since 11 AM 10/5- inotrope weaned - later noted to have elevated Lactate, rising LFTs, milrinone restarted, also concern for pulm congestion - bumex drip started    , LFTs in 1000's  Started on milrinone , Bumex gtt and  Amio  d/tameka.  statin on hold for lfts   TTE organized clot adjacent RVOT  Zosyn/vanco for leukocytosis, cultures neg  10/8  Transferred sdu  10/9  d/c milrinone d/c mar Cont iv diuretics D/c hagan in am tomorrow  10/10 VSS MTI dressing d/c  hagan removed at 0800  DTV 1800 ambulated  klyte 25x2 k 3.8 sorbitol for  BM ambulated disposition to rehab  10/11 VSSbc 19 procalcitonin  0.20 BMx3 speech eval today

## 2024-10-11 NOTE — SWALLOW BEDSIDE ASSESSMENT ADULT - PHARYNGEAL PHASE
Within functional limits pt c/o inability to swallow, but with encouragement was able to successfully trigger swallow

## 2024-10-11 NOTE — PROGRESS NOTE ADULT - SUBJECTIVE AND OBJECTIVE BOX
Subjective " hello "    VITAL SIGNS    Telemetry:   NSR 70-90    Vital Signs Last 24 Hrs  T(C): 36.7 (10-11-24 @ 03:27), Max: 36.7 (10-10-24 @ 11:46)  T(F): 98 (10-11-24 @ 03:27), Max: 98.1 (10-10-24 @ 23:08)  HR: 77 (10-11-24 @ 05:17) (75 - 83)  BP: 127/61 (10-11-24 @ 05:17) (115/57 - 127/61)  RR: 18 (10-11-24 @ 05:17) (18 - 18)  SpO2: 94% (10-11-24 @ 05:17) (91% - 95%)           10-10 @ 07:01  -  10-11 @ 07:00  --------------------------------------------------------  IN: 640 mL / OUT: 450 mL / NET: 190 mL                          11.1   19.18 )-----------( 272      ( 11 Oct 2024 05:49 )             34.1       10-11    135  |  89[L]  |  24[H]  ----------------------------<  65[L]  3.9   |  35[H]  |  1.16    Ca    8.7      11 Oct 2024 05:49  Phos  3.2     10-11  Mg     2.2     10-11    TPro  7.2  /  Alb  3.6  /  TBili  1.8[H]  /  DBili  x   /  AST  44[H]  /  ALT  138[H]  /  AlkPhos  103  10-11      MEDICATIONS  (STANDING):  albuterol/ipratropium for Nebulization 3 milliLiter(s) Nebulizer every 8 hours  aMIOdarone    Tablet 400 milliGRAM(s) Oral every 8 hours  aMIOdarone    Tablet   Oral   aspirin enteric coated 81 milliGRAM(s) Oral daily  chlorhexidine 2% Cloths 1 Application(s) Topical daily  dextrose 50% Injectable 50 milliLiter(s) IV Push every 15 minutes  furosemide   Injectable 40 milliGRAM(s) IV Push every 12 hours  heparin   Injectable 5000 Unit(s) SubCutaneous every 12 hours  insulin glargine Injectable (LANTUS) 26 Unit(s) SubCutaneous at bedtime  insulin lispro (ADMELOG) corrective regimen sliding scale   SubCutaneous three times a day before meals  insulin lispro (ADMELOG) corrective regimen sliding scale   SubCutaneous at bedtime  insulin lispro Injectable (ADMELOG) 10 Unit(s) SubCutaneous three times a day before meals  levothyroxine 75 MICROGram(s) Oral daily  melatonin 5 milliGRAM(s) Oral at bedtime  metoclopramide Injectable 5 milliGRAM(s) IV Push every 8 hours  metoprolol tartrate 12.5 milliGRAM(s) Oral two times a day  piperacillin/tazobactam IVPB.. 3.375 Gram(s) IV Intermittent every 8 hours  sodium chloride 0.9%. 1000 milliLiter(s) (10 mL/Hr) IV Continuous <Continuous>  spironolactone 25 milliGRAM(s) Oral daily    MEDICATIONS  (PRN):        Bilirubin Total: 1.8 mg/dL (10-11 @ 05:49)    CAPILLARY BLOOD GLUCOSE      POCT Blood Glucose.: 73 mg/dL (11 Oct 2024 07:30)  POCT Blood Glucose.: 69 mg/dL (11 Oct 2024 07:29)  POCT Blood Glucose.: 137 mg/dL (10 Oct 2024 21:01)  POCT Blood Glucose.: 126 mg/dL (10 Oct 2024 16:45)  POCT Blood Glucose.: 116 mg/dL (10 Oct 2024 11:43)              Pacing Wires        [  ]   Settings:                                  Isolated  [x  ]                             PHYSICAL EXAM        Neurology: alert and oriented x 3, nonfocal, no gross deficits    CV : [id number]    Sternal Wound :  SHY sternum  Stable    Lungs: B/l breath sounds on room air     Abdomen: soft, nontender, nondistended, positive bowel sounds, last bowel movement 10/11x3    :voids               Extremities:   warm well perfused   B/lle + Dp +edema no calf tenderness                                        Physical Therapy Rec:   Home  [  ]   Home w/ PT  [  ]  Rehab  [  x    Discussed with Cardiothoracic Team at AM rounds.

## 2024-10-11 NOTE — SWALLOW BEDSIDE ASSESSMENT ADULT - SLP PERTINENT HISTORY OF CURRENT PROBLEM
73 y/o female w/ PMH of hypothyroidism, CVA x2 (2018), HTN, HLD and DM who initially p/t [place] w/ worsening back pain, SOB, N/V and CP. She was transferred as a Tier I w/ c/f NSTEMI and hypotension (SBP 80s). Her son was at bedside and says she it typically mobile with her walker and has never had such back pain or CP. ECG with ST depressions I, II, aVL, V2-V5 and ST elevations aVR, III, V1. She was ASA and brilinta and heparin loaded at OSH. Pt. underwent R/LHC found to have severe LM and LAD disease and moderate pRCA disease. IABP placed for coronary perfusion and admitted to CICU for CABG eval. On arrival to unit pt. endorses nausea and had one episode of emesis relieved w/ antiemetics.

## 2024-10-11 NOTE — PROGRESS NOTE ADULT - PROBLEM SELECTOR PLAN 1
s/p CABG 3 AGNIESZKA IABP on 10/3   Asa, 81 LOP 12.5 bid    amiodarone load    diurese with  lasix 40 iv q12  aldactone 25 qd    Statin  on hold for lft's,    primacor  d/c 10/9   Chest PT,  Incentive spirometry, wound care and assessment.  Ambulate .  disposition to rehab s/p CABG 3 AGNIESZKA IABP on 10/3   Asa, 81 LOP 12.5 bid    amiodarone load    diurese with  lasix 40 po bid  aldactone 25 qd    Statin  on hold for lft's,    primacor  d/c 10/9   Chest PT,  Incentive spirometry, wound care and assessment.  Ambulate .  disposition to rehab Monday

## 2024-10-11 NOTE — SWALLOW BEDSIDE ASSESSMENT ADULT - ASR SWALLOW RECOMMEND DIAG
not indicated given no overt s/s of aspiration, medical team not concerned for clinical signs of aspiration and/or PNA, and overall reduced intake/refusal of PO would likely lead to exam yielding suboptimal results

## 2024-10-11 NOTE — SWALLOW BEDSIDE ASSESSMENT ADULT - SLP GENERAL OBSERVATIONS
Patient encountered awake and alert, OOB in chair, on RA, A&Ox4. Vocal quality WNL. Able to follow commands and make wants/needs known.

## 2024-10-11 NOTE — SWALLOW BEDSIDE ASSESSMENT ADULT - COMMENTS
S/p CABG 10/3; transferred to SDU on 10/8  Per chart on 10/11, pt with hypoglycemia in setting of poor oral intake.

## 2024-10-11 NOTE — PROGRESS NOTE ADULT - SUBJECTIVE AND OBJECTIVE BOX
Chief complaint    Patient is a 74y old  Female who presents with a chief complaint of CP (11 Oct 2024 07:32)   Review of systems  Patient appears comfortable.    Labs and Fingersticks  CAPILLARY BLOOD GLUCOSE      POCT Blood Glucose.: 73 mg/dL (11 Oct 2024 07:30)  POCT Blood Glucose.: 69 mg/dL (11 Oct 2024 07:29)  POCT Blood Glucose.: 137 mg/dL (10 Oct 2024 21:01)  POCT Blood Glucose.: 126 mg/dL (10 Oct 2024 16:45)  POCT Blood Glucose.: 116 mg/dL (10 Oct 2024 11:43)      Anion Gap: 11 (10-11 @ 05:49)  Anion Gap: 11 (10-10 @ 04:50)      Calcium: 8.7 (10-11 @ 05:49)  Calcium: 8.3 *L* (10-10 @ 04:50)  Albumin: 3.6 (10-11 @ 05:49)    Alanine Aminotransferase (ALT/SGPT): 138 *H* (10-11 @ 05:49)  Alkaline Phosphatase: 103 (10-11 @ 05:49)  Aspartate Aminotransferase (AST/SGOT): 44 *H* (10-11 @ 05:49)        10-11    135  |  89[L]  |  24[H]  ----------------------------<  65[L]  3.9   |  35[H]  |  1.16    Ca    8.7      11 Oct 2024 05:49  Phos  3.2     10-11  Mg     2.2     10-11    TPro  7.2  /  Alb  3.6  /  TBili  1.8[H]  /  DBili  x   /  AST  44[H]  /  ALT  138[H]  /  AlkPhos  103  10-11                        11.1   19.18 )-----------( 272      ( 11 Oct 2024 05:49 )             34.1     Medications  MEDICATIONS  (STANDING):  albuterol/ipratropium for Nebulization 3 milliLiter(s) Nebulizer every 8 hours  aMIOdarone    Tablet 400 milliGRAM(s) Oral every 8 hours  aMIOdarone    Tablet   Oral   aspirin enteric coated 81 milliGRAM(s) Oral daily  chlorhexidine 2% Cloths 1 Application(s) Topical daily  dextrose 50% Injectable 50 milliLiter(s) IV Push every 15 minutes  furosemide    Tablet 40 milliGRAM(s) Oral two times a day  heparin   Injectable 5000 Unit(s) SubCutaneous every 12 hours  insulin glargine Injectable (LANTUS) 18 Unit(s) SubCutaneous at bedtime  insulin lispro (ADMELOG) corrective regimen sliding scale   SubCutaneous three times a day before meals  insulin lispro (ADMELOG) corrective regimen sliding scale   SubCutaneous at bedtime  insulin lispro Injectable (ADMELOG) 7 Unit(s) SubCutaneous three times a day before meals  levothyroxine 75 MICROGram(s) Oral daily  melatonin 5 milliGRAM(s) Oral at bedtime  metoprolol tartrate 12.5 milliGRAM(s) Oral two times a day  piperacillin/tazobactam IVPB.. 3.375 Gram(s) IV Intermittent every 8 hours  sodium chloride 0.9%. 1000 milliLiter(s) (10 mL/Hr) IV Continuous <Continuous>  spironolactone 25 milliGRAM(s) Oral daily      Physical Exam  General: Patient appears comfortable.  Vital Signs Last 12 Hrs  T(F): 98 (10-11-24 @ 07:11), Max: 98.1 (10-10-24 @ 23:08)  HR: 73 (10-11-24 @ 07:11) (73 - 77)  BP: 114/74 (10-11-24 @ 07:11) (114/74 - 127/61)  BP(mean): 90 (10-11-24 @ 07:11) (81 - 90)  RR: 18 (10-11-24 @ 07:11) (18 - 18)  SpO2: 91% (10-11-24 @ 07:11) (91% - 94%)  Neck: No palpable thyroid nodules.  CVS: S1S2, No murmurs  Respiratory: No wheezing, no crepitations  GI: Abdomen soft, non tender.    Diagnostics    Free Thyroxine, Serum: AM (10-08 @ 08:13)      Radiology:

## 2024-10-11 NOTE — PROGRESS NOTE ADULT - PROBLEM SELECTOR PLAN 1
Will decrease Lantus to 18 units at bed time.  Will decrease Admelog to 7 units before each meal in addition to Admelog correction scale coverage.  Patient counseled for compliance with consistent low carb diet.    May get DC home on Lantus 20u qhs, Jardiance 10mg daily. FU 2 weeks

## 2024-10-12 LAB
ALBUMIN SERPL ELPH-MCNC: 3.5 G/DL — SIGNIFICANT CHANGE UP (ref 3.3–5)
ALP SERPL-CCNC: 91 U/L — SIGNIFICANT CHANGE UP (ref 40–120)
ALT FLD-CCNC: 102 U/L — HIGH (ref 10–45)
ANION GAP SERPL CALC-SCNC: 10 MMOL/L — SIGNIFICANT CHANGE UP (ref 5–17)
AST SERPL-CCNC: 33 U/L — SIGNIFICANT CHANGE UP (ref 10–40)
BASOPHILS # BLD AUTO: 0 K/UL — SIGNIFICANT CHANGE UP (ref 0–0.2)
BASOPHILS NFR BLD AUTO: 0 % — SIGNIFICANT CHANGE UP (ref 0–2)
BILIRUB SERPL-MCNC: 1.6 MG/DL — HIGH (ref 0.2–1.2)
BUN SERPL-MCNC: 22 MG/DL — SIGNIFICANT CHANGE UP (ref 7–23)
CALCIUM SERPL-MCNC: 8.4 MG/DL — SIGNIFICANT CHANGE UP (ref 8.4–10.5)
CHLORIDE SERPL-SCNC: 88 MMOL/L — LOW (ref 96–108)
CO2 SERPL-SCNC: 35 MMOL/L — HIGH (ref 22–31)
CREAT SERPL-MCNC: 1.3 MG/DL — SIGNIFICANT CHANGE UP (ref 0.5–1.3)
EGFR: 43 ML/MIN/1.73M2 — LOW
EOSINOPHIL # BLD AUTO: 0.42 K/UL — SIGNIFICANT CHANGE UP (ref 0–0.5)
EOSINOPHIL NFR BLD AUTO: 2.6 % — SIGNIFICANT CHANGE UP (ref 0–6)
GIANT PLATELETS BLD QL SMEAR: PRESENT — SIGNIFICANT CHANGE UP
GLUCOSE BLDC GLUCOMTR-MCNC: 125 MG/DL — HIGH (ref 70–99)
GLUCOSE BLDC GLUCOMTR-MCNC: 135 MG/DL — HIGH (ref 70–99)
GLUCOSE BLDC GLUCOMTR-MCNC: 158 MG/DL — HIGH (ref 70–99)
GLUCOSE BLDC GLUCOMTR-MCNC: 87 MG/DL — SIGNIFICANT CHANGE UP (ref 70–99)
GLUCOSE SERPL-MCNC: 55 MG/DL — LOW (ref 70–99)
HCT VFR BLD CALC: 31.6 % — LOW (ref 34.5–45)
HGB BLD-MCNC: 10.4 G/DL — LOW (ref 11.5–15.5)
LYMPHOCYTES # BLD AUTO: 13.8 % — SIGNIFICANT CHANGE UP (ref 13–44)
LYMPHOCYTES # BLD AUTO: 2.22 K/UL — SIGNIFICANT CHANGE UP (ref 1–3.3)
MAGNESIUM SERPL-MCNC: 2.2 MG/DL — SIGNIFICANT CHANGE UP (ref 1.6–2.6)
MANUAL SMEAR VERIFICATION: SIGNIFICANT CHANGE UP
MCHC RBC-ENTMCNC: 29.1 PG — SIGNIFICANT CHANGE UP (ref 27–34)
MCHC RBC-ENTMCNC: 32.9 GM/DL — SIGNIFICANT CHANGE UP (ref 32–36)
MCV RBC AUTO: 88.5 FL — SIGNIFICANT CHANGE UP (ref 80–100)
MONOCYTES # BLD AUTO: 1.24 K/UL — HIGH (ref 0–0.9)
MONOCYTES NFR BLD AUTO: 7.7 % — SIGNIFICANT CHANGE UP (ref 2–14)
NEUTROPHILS # BLD AUTO: 12.19 K/UL — HIGH (ref 1.8–7.4)
NEUTROPHILS NFR BLD AUTO: 75.9 % — SIGNIFICANT CHANGE UP (ref 43–77)
NRBC # BLD: 1 /100 WBCS — HIGH (ref 0–0)
OVALOCYTES BLD QL SMEAR: SLIGHT — SIGNIFICANT CHANGE UP
PHOSPHATE SERPL-MCNC: 3.7 MG/DL — SIGNIFICANT CHANGE UP (ref 2.5–4.5)
PLAT MORPH BLD: ABNORMAL
PLATELET # BLD AUTO: 273 K/UL — SIGNIFICANT CHANGE UP (ref 150–400)
POIKILOCYTOSIS BLD QL AUTO: SLIGHT — SIGNIFICANT CHANGE UP
POLYCHROMASIA BLD QL SMEAR: SLIGHT — SIGNIFICANT CHANGE UP
POTASSIUM SERPL-MCNC: 4.4 MMOL/L — SIGNIFICANT CHANGE UP (ref 3.5–5.3)
POTASSIUM SERPL-SCNC: 4.4 MMOL/L — SIGNIFICANT CHANGE UP (ref 3.5–5.3)
PROT SERPL-MCNC: 6.9 G/DL — SIGNIFICANT CHANGE UP (ref 6–8.3)
RBC # BLD: 3.57 M/UL — LOW (ref 3.8–5.2)
RBC # FLD: 15.8 % — HIGH (ref 10.3–14.5)
RBC BLD AUTO: ABNORMAL
SODIUM SERPL-SCNC: 133 MMOL/L — LOW (ref 135–145)
STOMATOCYTES BLD QL SMEAR: SLIGHT — SIGNIFICANT CHANGE UP
WBC # BLD: 16.06 K/UL — HIGH (ref 3.8–10.5)
WBC # FLD AUTO: 16.06 K/UL — HIGH (ref 3.8–10.5)

## 2024-10-12 RX ORDER — METOPROLOL TARTRATE 50 MG
25 TABLET ORAL
Refills: 0 | Status: DISCONTINUED | OUTPATIENT
Start: 2024-10-12 | End: 2024-10-12

## 2024-10-12 RX ORDER — OXYCODONE HYDROCHLORIDE 30 MG/1
5 TABLET, FILM COATED, EXTENDED RELEASE ORAL EVERY 4 HOURS
Refills: 0 | Status: DISCONTINUED | OUTPATIENT
Start: 2024-10-12 | End: 2024-10-14

## 2024-10-12 RX ORDER — METOPROLOL TARTRATE 50 MG
25 TABLET ORAL DAILY
Refills: 0 | Status: DISCONTINUED | OUTPATIENT
Start: 2024-10-12 | End: 2024-10-14

## 2024-10-12 RX ORDER — INSULIN GLARGINE 300 U/ML
8 INJECTION, SOLUTION SUBCUTANEOUS AT BEDTIME
Refills: 0 | Status: DISCONTINUED | OUTPATIENT
Start: 2024-10-12 | End: 2024-10-14

## 2024-10-12 RX ORDER — INSULIN LISPRO 100/ML
5 VIAL (ML) SUBCUTANEOUS
Refills: 0 | Status: DISCONTINUED | OUTPATIENT
Start: 2024-10-12 | End: 2024-10-14

## 2024-10-12 RX ORDER — DOCOSANOL 100 MG/G
1 CREAM TOPICAL
Refills: 0 | Status: DISCONTINUED | OUTPATIENT
Start: 2024-10-12 | End: 2024-10-13

## 2024-10-12 RX ORDER — ONDANSETRON HCL/PF 4 MG/2 ML
4 VIAL (ML) INJECTION ONCE
Refills: 0 | Status: COMPLETED | OUTPATIENT
Start: 2024-10-12 | End: 2024-10-12

## 2024-10-12 RX ADMIN — PIPERACILLIN SODIUM AND TAZOBACTAM SODIUM 25 GRAM(S): 12; 1.5 INJECTION, POWDER, LYOPHILIZED, FOR SOLUTION INTRAVENOUS at 13:27

## 2024-10-12 RX ADMIN — Medication 5 UNIT(S): at 16:44

## 2024-10-12 RX ADMIN — OXYCODONE HYDROCHLORIDE 5 MILLIGRAM(S): 30 TABLET, FILM COATED, EXTENDED RELEASE ORAL at 03:00

## 2024-10-12 RX ADMIN — FUROSEMIDE 40 MILLIGRAM(S): 10 INJECTION INTRAVENOUS at 13:27

## 2024-10-12 RX ADMIN — Medication 12.5 MILLIGRAM(S): at 05:29

## 2024-10-12 RX ADMIN — Medication 5000 UNIT(S): at 16:45

## 2024-10-12 RX ADMIN — Medication 7 UNIT(S): at 11:30

## 2024-10-12 RX ADMIN — Medication 5000 UNIT(S): at 05:30

## 2024-10-12 RX ADMIN — Medication 5 MILLIGRAM(S): at 21:22

## 2024-10-12 RX ADMIN — CHLORHEXIDINE GLUCONATE ORAL RINSE 1 APPLICATION(S): 1.2 SOLUTION DENTAL at 11:30

## 2024-10-12 RX ADMIN — PIPERACILLIN SODIUM AND TAZOBACTAM SODIUM 25 GRAM(S): 12; 1.5 INJECTION, POWDER, LYOPHILIZED, FOR SOLUTION INTRAVENOUS at 05:29

## 2024-10-12 RX ADMIN — INSULIN GLARGINE 8 UNIT(S): 300 INJECTION, SOLUTION SUBCUTANEOUS at 21:21

## 2024-10-12 RX ADMIN — Medication 4 MILLIGRAM(S): at 08:31

## 2024-10-12 RX ADMIN — SPIRONOLACTONE 25 MILLIGRAM(S): 100 TABLET, FILM COATED ORAL at 05:30

## 2024-10-12 RX ADMIN — Medication 2: at 11:29

## 2024-10-12 RX ADMIN — Medication 25 MILLIGRAM(S): at 11:26

## 2024-10-12 RX ADMIN — OXYCODONE HYDROCHLORIDE 5 MILLIGRAM(S): 30 TABLET, FILM COATED, EXTENDED RELEASE ORAL at 02:30

## 2024-10-12 RX ADMIN — AMIODARONE HYDROCHLORIDE 200 MILLIGRAM(S): 50 INJECTION, SOLUTION INTRAVENOUS at 05:29

## 2024-10-12 RX ADMIN — IPRATROPIUM BROMIDE AND ALBUTEROL SULFATE 3 MILLILITER(S): .5; 3 SOLUTION RESPIRATORY (INHALATION) at 05:30

## 2024-10-12 RX ADMIN — IPRATROPIUM BROMIDE AND ALBUTEROL SULFATE 3 MILLILITER(S): .5; 3 SOLUTION RESPIRATORY (INHALATION) at 13:27

## 2024-10-12 RX ADMIN — IPRATROPIUM BROMIDE AND ALBUTEROL SULFATE 3 MILLILITER(S): .5; 3 SOLUTION RESPIRATORY (INHALATION) at 21:21

## 2024-10-12 RX ADMIN — PIPERACILLIN SODIUM AND TAZOBACTAM SODIUM 25 GRAM(S): 12; 1.5 INJECTION, POWDER, LYOPHILIZED, FOR SOLUTION INTRAVENOUS at 21:22

## 2024-10-12 RX ADMIN — Medication 75 MICROGRAM(S): at 05:30

## 2024-10-12 RX ADMIN — FUROSEMIDE 40 MILLIGRAM(S): 10 INJECTION INTRAVENOUS at 05:29

## 2024-10-12 RX ADMIN — Medication 81 MILLIGRAM(S): at 11:27

## 2024-10-12 NOTE — PROGRESS NOTE ADULT - ASSESSMENT
75 y/o female w/ PMH of hypothyroidism, CVA x2 (2018), HTN, HLD and DM on home insulins, now s/p cabg    Assessment  DMT2: 74y Female with DM T2 with hyperglycemia, A1C 6.8% , was on basal insulin at home, now on basal bolus insulin with coverage, blood sugars  trending down,  had hypoglycemic event.  Will need tight glycemic control for postop wound healing.   CAD: on medications, stable, monitored. s/p cabg   Hypothyroidism: On Synthroid 75 mcg po daily, compliant with Synthroid intake, asymptomatic. TFTs euthyroid, FT4 1.9 , rpt FT4   HTN: on antihypertensive medications, monitored, asymptomatic.          Rachid Jeronimo MD  Cell: [phone]  Office: [phone]

## 2024-10-12 NOTE — PROGRESS NOTE ADULT - SUBJECTIVE AND OBJECTIVE BOX
Chief complaint    Patient is a 74y old  Female who presents with a chief complaint of CP (12 Oct 2024 06:41)   Review of systems  Patient appears comfortable.    Labs and Fingersticks  CAPILLARY BLOOD GLUCOSE      POCT Blood Glucose.: 158 mg/dL (12 Oct 2024 11:13)  POCT Blood Glucose.: 87 mg/dL (12 Oct 2024 07:32)  POCT Blood Glucose.: 120 mg/dL (11 Oct 2024 21:27)  POCT Blood Glucose.: 199 mg/dL (11 Oct 2024 16:47)      Anion Gap: 10 (10-12 @ 06:42)  Anion Gap: 11 (10-11 @ 05:49)      Calcium: 8.4 (10-12 @ 06:42)  Calcium: 8.7 (10-11 @ 05:49)  Albumin: 3.5 (10-12 @ 06:42)  Albumin: 3.6 (10-11 @ 05:49)    Alanine Aminotransferase (ALT/SGPT): 102 *H* (10-12 @ 06:42)  Alanine Aminotransferase (ALT/SGPT): 138 *H* (10-11 @ 05:49)  Alkaline Phosphatase: 91 (10-12 @ 06:42)  Alkaline Phosphatase: 103 (10-11 @ 05:49)  Aspartate Aminotransferase (AST/SGOT): 33 (10-12 @ 06:42)  Aspartate Aminotransferase (AST/SGOT): 44 *H* (10-11 @ 05:49)        10-12    133[L]  |  88[L]  |  22  ----------------------------<  55[L]  4.4   |  35[H]  |  1.30    Ca    8.4      12 Oct 2024 06:42  Phos  3.7     10-12  Mg     2.2     10-12    TPro  6.9  /  Alb  3.5  /  TBili  1.6[H]  /  DBili  x   /  AST  33  /  ALT  102[H]  /  AlkPhos  91  10-12                        10.4   16.06 )-----------( 273      ( 12 Oct 2024 06:42 )             31.6     Medications  MEDICATIONS  (STANDING):  albuterol/ipratropium for Nebulization 3 milliLiter(s) Nebulizer every 8 hours  aMIOdarone    Tablet 200 milliGRAM(s) Oral daily  aMIOdarone    Tablet   Oral   aspirin enteric coated 81 milliGRAM(s) Oral daily  chlorhexidine 2% Cloths 1 Application(s) Topical daily  dextrose 50% Injectable 50 milliLiter(s) IV Push every 15 minutes  docosanol 10% Cream 1 Application(s) Topical five times a day  furosemide    Tablet 40 milliGRAM(s) Oral two times a day  heparin   Injectable 5000 Unit(s) SubCutaneous every 12 hours  insulin glargine Injectable (LANTUS) 8 Unit(s) SubCutaneous at bedtime  insulin lispro (ADMELOG) corrective regimen sliding scale   SubCutaneous three times a day before meals  insulin lispro (ADMELOG) corrective regimen sliding scale   SubCutaneous at bedtime  insulin lispro Injectable (ADMELOG) 5 Unit(s) SubCutaneous three times a day before meals  levothyroxine 75 MICROGram(s) Oral daily  melatonin 5 milliGRAM(s) Oral at bedtime  metoprolol succinate ER 25 milliGRAM(s) Oral daily  piperacillin/tazobactam IVPB.. 3.375 Gram(s) IV Intermittent every 8 hours  sodium chloride 0.9%. 1000 milliLiter(s) (10 mL/Hr) IV Continuous <Continuous>  spironolactone 25 milliGRAM(s) Oral daily      Physical Exam  General: Patient appears comfortable.  Vital Signs Last 12 Hrs  T(F): 98 (10-12-24 @ 11:08), Max: 98.2 (10-12-24 @ 03:13)  HR: 69 (10-12-24 @ 11:08) (68 - 79)  BP: 109/53 (10-12-24 @ 11:08) (109/53 - 133/62)  BP(mean): 77 (10-12-24 @ 11:08) (77 - 89)  RR: 18 (10-12-24 @ 11:08) (18 - 18)  SpO2: 98% (10-12-24 @ 11:08) (91% - 98%)  Neck: No palpable thyroid nodules.  CVS: S1S2, No murmurs  Respiratory: No wheezing, no crepitations  GI: Abdomen soft, non tender.    Diagnostics    Free Thyroxine, Serum: AM (10-08 @ 08:13)      Radiology:

## 2024-10-12 NOTE — PROGRESS NOTE ADULT - SUBJECTIVE AND OBJECTIVE BOX
Subjective " hello I am Ok"    VITAL SIGNS    Telemetry:  NSR  1degree  70-90    Vital Signs Last 24 Hrs  T(C): 36.8 (10-12-24 @ 03:13), Max: 36.8 (10-11-24 @ 11:07)  T(F): 98.2 (10-12-24 @ 03:13), Max: 98.3 (10-11-24 @ 23:08)  HR: 79 (10-12-24 @ 05:27) (73 - 88)  BP: 133/62 (10-12-24 @ 05:27) (110/57 - 148/63)  RR: 18 (10-12-24 @ 03:13) (18 - 18)  SpO2: 94% (10-12-24 @ 05:27) (91% - 95%)             10-10 @ 07:01  -  10-11 @ 07:00  --------------------------------------------------------  IN: 640 mL / OUT: 450 mL / NET: 190 mL    10-11 @ 07:01  -  10-12 @ 06:43  --------------------------------------------------------  IN: 1200 mL / OUT: 1300 mL / NET: -100 mL      Daily Weight in k.5 (12 Oct 2024 05:27)                   CAPILLARY BLOOD GLUCOSE      POCT Blood Glucose.: 120 mg/dL (11 Oct 2024 21:27)  POCT Blood Glucose.: 199 mg/dL (11 Oct 2024 16:47)  POCT Blood Glucose.: 80 mg/dL (11 Oct 2024 12:13)  POCT Blood Glucose.: 65 mg/dL (11 Oct 2024 11:47)  POCT Blood Glucose.: 64 mg/dL (11 Oct 2024 11:46)  POCT Blood Glucose.: 73 mg/dL (11 Oct 2024 07:30)  POCT Blood Glucose.: 69 mg/dL (11 Oct 2024 07:29)          Pacing Wires        [  ]   Settings:                                  Isolated  [ x    n:                               PHYSICAL EXAM        Neurology: alert and oriented x 3, nonfocal, no gross deficits    CV :[id number]    Sternal Wound :  SHY sternum stable     Lungs:  b/l easy breath sounds     Abdomen: soft, nontender, nondistended, positive bowel sounds, last bowel movement 10/11    :voids               Extremities:  warm well perfused equal strength throughout  b/l + DP + edema no calf tenderness                                          Physical Therapy Rec:   Home  [  ]   Home w/ PT  [  ]  Rehab  [ x ]    Discussed with Cardiothoracic Team at AM rounds. Subjective " hello I am Ok"    VITAL SIGNS    Telemetry:  NSR  1degree  70-90    Vital Signs Last 24 Hrs  T(C): 36.8 (10-12-24 @ 03:13), Max: 36.8 (10-11-24 @ 11:07)  T(F): 98.2 (10-12-24 @ 03:13), Max: 98.3 (10-11-24 @ 23:08)  HR: 79 (10-12-24 @ 05:27) (73 - 88)  BP: 133/62 (10-12-24 @ 05:27) (110/57 - 148/63)  RR: 18 (10-12-24 @ 03:13) (18 - 18)  SpO2: 94% (10-12-24 @ 05:27) (91% - 95%)             10-10 @ 07:01  -  10-11 @ 07:00  --------------------------------------------------------  IN: 640 mL / OUT: 450 mL / NET: 190 mL    10-11 @ 07:01  -  10-12 @ 06:43  --------------------------------------------------------  IN: 1200 mL / OUT: 1300 mL / NET: -100 mL      Daily Weight in k.5 (12 Oct 2024 05:27)                   CAPILLARY BLOOD GLUCOSE      POCT Blood Glucose.: 120 mg/dL (11 Oct 2024 21:27)  POCT Blood Glucose.: 199 mg/dL (11 Oct 2024 16:47)  POCT Blood Glucose.: 80 mg/dL (11 Oct 2024 12:13)  POCT Blood Glucose.: 65 mg/dL (11 Oct 2024 11:47)  POCT Blood Glucose.: 64 mg/dL (11 Oct 2024 11:46)  POCT Blood Glucose.: 73 mg/dL (11 Oct 2024 07:30)  POCT Blood Glucose.: 69 mg/dL (11 Oct 2024 07:29)          Pacing Wires        [  ]   Settings:                                  Isolated  [ x    n:                               PHYSICAL EXAM        Neurology: alert and oriented x 3, nonfocal, no gross deficits    CV :[id number]    Sternal Wound :  SHY sternum stable     Lungs:  b/l easy breath sounds     Abdomen: soft, nontender, nondistended, positive bowel sounds, last bowel movement 10/11    :voids               Extremities:  warm well perfused equal strength throughout  b/l + DP + edema no calf tenderness      SVG sites benign                                    Physical Therapy Rec:   Home  [  ]   Home w/ PT  [  ]  Rehab  [ x ]    Discussed with Cardiothoracic Team at AM rounds.

## 2024-10-12 NOTE — PROGRESS NOTE ADULT - PROBLEM SELECTOR PLAN 1
s/p CABG 3 AGNIESZKA IABP on 10/3   + PW  Asa, 81 LOP 25 bid   amiodarone 200 qd  diurese with  lasix 40 po bid  aldactone 25 qd    Statin  on hold for lft's,    primacor  d/c 10/9   Chest PT,  Incentive spirometry, wound care and assessment.  Ambulate  with walker   disposition to rehab Monday- [facility] s/p CABG 3 AGNIESZKA IABP on 10/3   + PW  Asa, 81  Toprol 25 qd   amiodarone 200 qd  diurese with  lasix 40 po bid  aldactone 25 qd    Statin  on hold for lft's,    primacor  d/c 10/9   Chest PT,  Incentive spirometry, wound care and assessment.  Ambulate  with walker   disposition to rehab Monday- [facility]

## 2024-10-12 NOTE — PROGRESS NOTE ADULT - PROBLEM SELECTOR PLAN 1
Will decrease Lantus to 8 units at bed time.  Will decrease Admelog to 5 units before each meal in addition to Admelog correction scale coverage.  Patient counseled for compliance with consistent low carb diet.    May get DC home on Lantus 20u qhs, Jardiance 10mg daily. FU 2 weeks

## 2024-10-12 NOTE — PROGRESS NOTE ADULT - ASSESSMENT
75 y/o female w/ PMH of hypothyroidism, CVA x2 (2018), HTN, HLD and DM who initially p/t [place] w/ worsening back pain, SOB, N/V and CP. She was transferred as a Tier I w/ c/f NSTEMI and hypotension (SBP 80s). Her son was at bedside and says she it typically mobile with her walker and has never had such back pain or CP. ECG with ST depressions I, II, aVL, V2-V5 and ST elevations aVR, III, V1. She was ASA and brilinta and heparin loaded at OSH. Pt. underwent R/LHC found to have severe LM and LAD disease and moderate pRCA disease. IABP placed for coronary perfusion and admitted to CICU for CABG eval. On arrival to unit pt. endorses nausea and had one episode of emesis relieved w/ antiemetics. (01 Oct 2024 03:06)  10/3/24 CABG, with SAMUEL , LIMA to LAD, SVG to OM,SVG to Diag  AGNIESZKA Clip   , IABP ef 45  Labile hemodynamics  volume resuscitation  pressor + inotrope  Extubated d 1, iabp d/c   wean , decrease in MVO2, increasd  PAF, amio load  Insulin infusion  10/5  off since 11 AM 10/5- inotrope weaned - later noted to have elevated Lactate, rising LFTs, milrinone restarted, also concern for pulm congestion - bumex drip started    , LFTs in 1000's  Started on milrinone , Bumex gtt and  Amio  d/tameka.  statin on hold for lfts   TTE organized clot adjacent RVOT  Zosyn/vanco for leukocytosis, cultures neg  10/8  Transferred sdu  10/9  d/c milrinone d/c mar Cont iv diuretics D/c hagan in am tomorrow  10/10 VSS MTI dressing d/c  hagan removed at 0800  DTV 1800 ambulated  klyte 25x2 k 3.8 sorbitol for  BM ambulated disposition to rehab  10/11 VSSbc 19 procalcitonin  0.20 BMx3 speech eval today   10/12 VSS ESR 46 afebrile passed bedside  swallow d/c pacing wires today rehab Monday 73 y/o female w/ PMH of hypothyroidism, CVA x2 (2018), HTN, HLD and DM who initially p/t [facility] w/ worsening back pain, SOB, N/V and CP. She was transferred as a Tier I w/ c/f NSTEMI and hypotension (SBP 80s). Her son was at bedside and says she it typically mobile with her walker and has never had such back pain or CP. ECG with ST depressions I, II, aVL, V2-V5 and ST elevations aVR, III, V1. She was ASA and brilinta and heparin loaded at OSH. Pt. underwent R/LHC found to have severe LM and LAD disease and moderate pRCA disease. IABP placed for coronary perfusion and admitted to CICU for CABG eval. On arrival to unit pt. endorses nausea and had one episode of emesis relieved w/ antiemetics. (01 Oct 2024 03:06)  10/3/24 CABG, with SAMUEL , LIMA to LAD, SVG to OM,SVG to Diag  AGNIESZKA Clip   , IABP ef 45  Labile hemodynamics  volume resuscitation  pressor + inotrope  Extubated d 1, iabp d/c   wean , decrease in MVO2, increasd  PAF, amio load  Insulin infusion  10/5  off since 11 AM 10/5- inotrope weaned - later noted to have elevated Lactate, rising LFTs, milrinone restarted, also concern for pulm congestion - bumex drip started    , LFTs in 1000's  Started on milrinone , Bumex gtt and  Amio  d/tameka.  statin on hold for lfts   TTE organized clot adjacent RVOT  Zosyn/vanco for leukocytosis, cultures neg  10/8  Transferred sdu  10/9  d/c milrinone d/c mar Cont iv diuretics D/c hagan in am tomorrow  10/10 VSS MTI dressing d/c  hagan removed at 0800  DTV 1800 ambulated  klyte 25x2 k 3.8 sorbitol for  BM ambulated disposition to rehab  10/11 VSSbc 19 procalcitonin  0.20 BMx3 speech eval today   10/12 VSS ESR 46 afebrile passed bedside  swallow d/c pacing wires  today  Toprol 25 qdrehab Monday

## 2024-10-13 LAB
ADD ON TEST-SPECIMEN IN LAB: SIGNIFICANT CHANGE UP
ALBUMIN SERPL ELPH-MCNC: 3.3 G/DL — SIGNIFICANT CHANGE UP (ref 3.3–5)
ALP SERPL-CCNC: 82 U/L — SIGNIFICANT CHANGE UP (ref 40–120)
ALT FLD-CCNC: 80 U/L — HIGH (ref 10–45)
ANION GAP SERPL CALC-SCNC: 16 MMOL/L — SIGNIFICANT CHANGE UP (ref 5–17)
AST SERPL-CCNC: 36 U/L — SIGNIFICANT CHANGE UP (ref 10–40)
BASOPHILS # BLD AUTO: 0.12 K/UL — SIGNIFICANT CHANGE UP (ref 0–0.2)
BASOPHILS NFR BLD AUTO: 0.7 % — SIGNIFICANT CHANGE UP (ref 0–2)
BILIRUB SERPL-MCNC: 1.4 MG/DL — HIGH (ref 0.2–1.2)
BUN SERPL-MCNC: 20 MG/DL — SIGNIFICANT CHANGE UP (ref 7–23)
CALCIUM SERPL-MCNC: 8.3 MG/DL — LOW (ref 8.4–10.5)
CHLORIDE SERPL-SCNC: 88 MMOL/L — LOW (ref 96–108)
CO2 SERPL-SCNC: 27 MMOL/L — SIGNIFICANT CHANGE UP (ref 22–31)
CREAT SERPL-MCNC: 1.34 MG/DL — HIGH (ref 0.5–1.3)
CRP SERPL-MCNC: 56 MG/L — HIGH (ref 0–4)
EGFR: 42 ML/MIN/1.73M2 — LOW
EOSINOPHIL # BLD AUTO: 0.22 K/UL — SIGNIFICANT CHANGE UP (ref 0–0.5)
EOSINOPHIL NFR BLD AUTO: 1.2 % — SIGNIFICANT CHANGE UP (ref 0–6)
GLUCOSE BLDC GLUCOMTR-MCNC: 104 MG/DL — HIGH (ref 70–99)
GLUCOSE BLDC GLUCOMTR-MCNC: 155 MG/DL — HIGH (ref 70–99)
GLUCOSE BLDC GLUCOMTR-MCNC: 178 MG/DL — HIGH (ref 70–99)
GLUCOSE BLDC GLUCOMTR-MCNC: 87 MG/DL — SIGNIFICANT CHANGE UP (ref 70–99)
GLUCOSE SERPL-MCNC: 92 MG/DL — SIGNIFICANT CHANGE UP (ref 70–99)
HCT VFR BLD CALC: 36.9 % — SIGNIFICANT CHANGE UP (ref 34.5–45)
HGB BLD-MCNC: 11.7 G/DL — SIGNIFICANT CHANGE UP (ref 11.5–15.5)
IMM GRANULOCYTES NFR BLD AUTO: 0.8 % — SIGNIFICANT CHANGE UP (ref 0–0.9)
LYMPHOCYTES # BLD AUTO: 15.1 % — SIGNIFICANT CHANGE UP (ref 13–44)
LYMPHOCYTES # BLD AUTO: 2.69 K/UL — SIGNIFICANT CHANGE UP (ref 1–3.3)
MAGNESIUM SERPL-MCNC: 2.2 MG/DL — SIGNIFICANT CHANGE UP (ref 1.6–2.6)
MCHC RBC-ENTMCNC: 29.3 PG — SIGNIFICANT CHANGE UP (ref 27–34)
MCHC RBC-ENTMCNC: 31.7 GM/DL — LOW (ref 32–36)
MCV RBC AUTO: 92.3 FL — SIGNIFICANT CHANGE UP (ref 80–100)
MONOCYTES # BLD AUTO: 1.88 K/UL — HIGH (ref 0–0.9)
MONOCYTES NFR BLD AUTO: 10.6 % — SIGNIFICANT CHANGE UP (ref 2–14)
NEUTROPHILS # BLD AUTO: 12.74 K/UL — HIGH (ref 1.8–7.4)
NEUTROPHILS NFR BLD AUTO: 71.6 % — SIGNIFICANT CHANGE UP (ref 43–77)
NRBC # BLD: 0 /100 WBCS — SIGNIFICANT CHANGE UP (ref 0–0)
PHOSPHATE SERPL-MCNC: 3.4 MG/DL — SIGNIFICANT CHANGE UP (ref 2.5–4.5)
PLATELET # BLD AUTO: 330 K/UL — SIGNIFICANT CHANGE UP (ref 150–400)
POTASSIUM SERPL-MCNC: 4.4 MMOL/L — SIGNIFICANT CHANGE UP (ref 3.5–5.3)
POTASSIUM SERPL-SCNC: 4.4 MMOL/L — SIGNIFICANT CHANGE UP (ref 3.5–5.3)
PROCALCITONIN SERPL-MCNC: 0.1 NG/ML — SIGNIFICANT CHANGE UP (ref 0.02–0.1)
PROT SERPL-MCNC: 7.2 G/DL — SIGNIFICANT CHANGE UP (ref 6–8.3)
RBC # BLD: 4 M/UL — SIGNIFICANT CHANGE UP (ref 3.8–5.2)
RBC # FLD: 16.1 % — HIGH (ref 10.3–14.5)
SODIUM SERPL-SCNC: 131 MMOL/L — LOW (ref 135–145)
WBC # BLD: 17.79 K/UL — HIGH (ref 3.8–10.5)
WBC # FLD AUTO: 17.79 K/UL — HIGH (ref 3.8–10.5)

## 2024-10-13 RX ADMIN — SPIRONOLACTONE 25 MILLIGRAM(S): 100 TABLET, FILM COATED ORAL at 05:13

## 2024-10-13 RX ADMIN — Medication 81 MILLIGRAM(S): at 13:23

## 2024-10-13 RX ADMIN — Medication 2: at 16:48

## 2024-10-13 RX ADMIN — PIPERACILLIN SODIUM AND TAZOBACTAM SODIUM 25 GRAM(S): 12; 1.5 INJECTION, POWDER, LYOPHILIZED, FOR SOLUTION INTRAVENOUS at 13:22

## 2024-10-13 RX ADMIN — FUROSEMIDE 40 MILLIGRAM(S): 10 INJECTION INTRAVENOUS at 05:13

## 2024-10-13 RX ADMIN — IPRATROPIUM BROMIDE AND ALBUTEROL SULFATE 3 MILLILITER(S): .5; 3 SOLUTION RESPIRATORY (INHALATION) at 05:12

## 2024-10-13 RX ADMIN — Medication 5 UNIT(S): at 16:48

## 2024-10-13 RX ADMIN — Medication 5 UNIT(S): at 07:53

## 2024-10-13 RX ADMIN — Medication 5000 UNIT(S): at 16:51

## 2024-10-13 RX ADMIN — Medication 5000 UNIT(S): at 05:13

## 2024-10-13 RX ADMIN — PIPERACILLIN SODIUM AND TAZOBACTAM SODIUM 25 GRAM(S): 12; 1.5 INJECTION, POWDER, LYOPHILIZED, FOR SOLUTION INTRAVENOUS at 05:12

## 2024-10-13 RX ADMIN — IPRATROPIUM BROMIDE AND ALBUTEROL SULFATE 3 MILLILITER(S): .5; 3 SOLUTION RESPIRATORY (INHALATION) at 13:22

## 2024-10-13 RX ADMIN — Medication 75 MICROGRAM(S): at 05:13

## 2024-10-13 RX ADMIN — Medication 5 MILLIGRAM(S): at 21:04

## 2024-10-13 RX ADMIN — AMIODARONE HYDROCHLORIDE 200 MILLIGRAM(S): 50 INJECTION, SOLUTION INTRAVENOUS at 07:53

## 2024-10-13 RX ADMIN — Medication 25 MILLIGRAM(S): at 05:13

## 2024-10-13 RX ADMIN — Medication 5 UNIT(S): at 12:26

## 2024-10-13 RX ADMIN — PIPERACILLIN SODIUM AND TAZOBACTAM SODIUM 25 GRAM(S): 12; 1.5 INJECTION, POWDER, LYOPHILIZED, FOR SOLUTION INTRAVENOUS at 21:04

## 2024-10-13 RX ADMIN — FUROSEMIDE 40 MILLIGRAM(S): 10 INJECTION INTRAVENOUS at 13:23

## 2024-10-13 RX ADMIN — IPRATROPIUM BROMIDE AND ALBUTEROL SULFATE 3 MILLILITER(S): .5; 3 SOLUTION RESPIRATORY (INHALATION) at 21:04

## 2024-10-13 RX ADMIN — INSULIN GLARGINE 8 UNIT(S): 300 INJECTION, SOLUTION SUBCUTANEOUS at 21:05

## 2024-10-13 RX ADMIN — CHLORHEXIDINE GLUCONATE ORAL RINSE 1 APPLICATION(S): 1.2 SOLUTION DENTAL at 11:08

## 2024-10-13 NOTE — PROGRESS NOTE ADULT - NS ATTEND AMEND GEN_ALL_CORE FT
Chart, labs, vitals, radiology reviewed. Above H&P reviewed and edited where appropriate. Agree with history and physical exam. Agree with assessment and plan. I reviewed the overnight course of events and discussed the care with the patient/ family. All the decisions in assessment and plan are made by me.

## 2024-10-13 NOTE — PROGRESS NOTE ADULT - PROBLEM SELECTOR PLAN 1
Continue Asa, 81  Toprol 25 qd   Statin  on hold for elevated lft's   Continue on amiodarone 200 qd for PAF  Continue diurese with  lasix 40 po bid and aldactone 25 qd   Strict I & Os, daily standing weight  Cough and deep breathe, Incentive Spirometry Q1h, Chest PT.  Ambulate 4x daily as tolerated and with PT.  disposition to subacute rehab Monday- [facility]

## 2024-10-13 NOTE — PROGRESS NOTE ADULT - PROBLEM SELECTOR PLAN 1
Lantus to 8 units at bed time.   Admelog to 5 units before each meal in addition to Admelog correction scale coverage.  Patient counseled for compliance with consistent low carb diet.    May get DC home on Lantus 10u qhs, Jardiance 10mg daily. FU 2 weeks

## 2024-10-13 NOTE — PROGRESS NOTE ADULT - ASSESSMENT
73 y/o female w/ PMH of hypothyroidism, CVA x2 (2018), HTN, HLD and DM on home insulins, now s/p cabg    Assessment  DMT2: 74y Female with DM T2 with hyperglycemia, A1C 6.8% , was on basal insulin at home, now on basal bolus insulin with coverage, blood sugars  trending down,  had hypoglycemic event.  Will need tight glycemic control for postop wound healing.   CAD: on medications, stable, monitored. s/p cabg   Hypothyroidism: On Synthroid 75 mcg po daily, compliant with Synthroid intake, asymptomatic. TFTs euthyroid, FT4 1.9 , rpt FT4   HTN: on antihypertensive medications, monitored, asymptomatic.        Discussed plan and management with Dr Kandace Melo NP - TEAMS  Rachid Jeronimo MD  Cell: [phone]  Office: [phone]              73 y/o female w/ PMH of hypothyroidism, CVA x2 (2018), HTN, HLD and DM on home insulins, now s/p cabg      Assessment  DMT2: 74y Female with DM T2 with hyperglycemia, A1C 6.8% , was on basal insulin at home, now on basal bolus insulin with coverage, blood sugars  trending down,  had hypoglycemic event.  Will need tight glycemic control for postop wound healing.   CAD: on medications, stable, monitored. s/p cabg   Hypothyroidism: On Synthroid 75 mcg po daily, compliant with Synthroid intake, asymptomatic. TFTs euthyroid, FT4 1.9 , rpt FT4   HTN: on antihypertensive medications, monitored, asymptomatic.        Discussed plan and management with Dr Kandace Melo NP - TEAMS  Rachid Jeronimo MD  Cell: [phone]  Office: [phone]

## 2024-10-13 NOTE — PROGRESS NOTE ADULT - SUBJECTIVE AND OBJECTIVE BOX
Subjective: Pt states "Hello" denies any CP or SOB. No acute events overnight.     Telemetry:  SR 70 - 80  Vital Signs Last 24 Hrs  T(C): 36.6 (10-13-24 @ 07:36), Max: 36.7 (10-12-24 @ 19:14)  T(F): 97.9 (10-13-24 @ 07:36), Max: 98.1 (10-12-24 @ 19:14)  HR: 70 (10-13-24 @ 07:36) (68 - 73)  BP: 131/58 (10-13-24 @ 07:36) (108/55 - 131/58)  RR: 18 (10-13-24 @ 07:36) (18 - 18)  SpO2: 93% (10-13-24 @ 07:36) (92% - 96%)             10-12 @ 07:01  -  10-13 @ 07:00  --------------------------------------------------------  IN: 760 mL / OUT: 1050 mL / NET: -290 mL      Daily Weight in k.5 (13 Oct 2024 05:14)                        11.7   17.79 )-----------( 330      ( 13 Oct 2024 05:46 )             36.9     131[L]  |  88[L]  |  20  ----------------------------<  92  4.4   |  27  |  1.34[H]    Phos  3.4     10-  Mg     2.2     10-    AST  36  /  ALT  80[H]  /  AlkPhos  82  10-        CAPILLARY BLOOD GLUCOSE  87 - 104            PHYSICAL EXAM  Neurology: A&Ox3, NAD  CV : RRR+S1S2  Sternal Wound: MSI CDI SHY,  Stable  Lungs: Respirations non-labored, B/L BS clear, diminished at bases  Abdomen: Soft, NT/ND, +BSx4Q, last BM 10/11 (-)N/V/D  : Voiding without difficulty  Extremities: B/L LE +1 edema, negative calf tenderness, +PP, B/L SVG incisions CDI   +RUE Midline             MEDICATIONS  albuterol/ipratropium for Nebulization 3 milliLiter(s) Nebulizer every 8 hours  aMIOdarone    Tablet 200 milliGRAM(s) Oral daily  aspirin enteric coated 81 milliGRAM(s) Oral daily  chlorhexidine 2% Cloths 1 Application(s) Topical daily  furosemide    Tablet 40 milliGRAM(s) Oral two times a day  heparin   Injectable 5000 Unit(s) SubCutaneous every 12 hours  insulin glargine Injectable (LANTUS) 8 Unit(s) SubCutaneous at bedtime  insulin lispro (ADMELOG) corrective regimen sliding scale   SubCutaneous three times a day before meals  insulin lispro (ADMELOG) corrective regimen sliding scale   SubCutaneous at bedtime  insulin lispro Injectable (ADMELOG) 5 Unit(s) SubCutaneous three times a day before meals  levothyroxine 75 MICROGram(s) Oral daily  melatonin 5 milliGRAM(s) Oral at bedtime  metoprolol succinate ER 25 milliGRAM(s) Oral daily  oxyCODONE    IR 5 milliGRAM(s) Oral every 4 hours PRN  piperacillin/tazobactam IVPB.. 3.375 Gram(s) IV Intermittent every 8 hours  sodium chloride 0.9%. 1000 milliLiter(s) IV Continuous <Continuous>  spironolactone 25 milliGRAM(s) Oral daily      Physical Therapy Rec:   Home  [  ]   Home w/ PT  [  ]  Rehab  [ X ]    Discussed with Cardiothoracic Team at AM rounds.

## 2024-10-13 NOTE — PROGRESS NOTE ADULT - SUBJECTIVE AND OBJECTIVE BOX
Chief complaint  Patient is a 74y old  Female who presents with a chief complaint of CP (13 Oct 2024 12:01)         Labs and Fingersticks  CAPILLARY BLOOD GLUCOSE      POCT Blood Glucose.: 87 mg/dL (13 Oct 2024 11:33)  POCT Blood Glucose.: 104 mg/dL (13 Oct 2024 07:24)  POCT Blood Glucose.: 135 mg/dL (12 Oct 2024 21:20)  POCT Blood Glucose.: 125 mg/dL (12 Oct 2024 16:40)      Anion Gap: 16 (10-13 @ 05:47)  Anion Gap: 10 (10-12 @ 06:42)      Calcium: 8.3 *L* (10-13 @ 05:47)  Calcium: 8.4 (10-12 @ 06:42)  Albumin: 3.3 (10-13 @ 05:47)  Albumin: 3.5 (10-12 @ 06:42)    Alanine Aminotransferase (ALT/SGPT): 80 *H* (10-13 @ 05:47)  Alanine Aminotransferase (ALT/SGPT): 102 *H* (10-12 @ 06:42)  Alkaline Phosphatase: 82 (10-13 @ 05:47)  Alkaline Phosphatase: 91 (10-12 @ 06:42)  Aspartate Aminotransferase (AST/SGOT): 36 (10-13 @ 05:47)  Aspartate Aminotransferase (AST/SGOT): 33 (10-12 @ 06:42)        10-13    131[L]  |  88[L]  |  20  ----------------------------<  92  4.4   |  27  |  1.34[H]    Ca    8.3[L]      13 Oct 2024 05:47  Phos  3.4     10-13  Mg     2.2     10-13    TPro  7.2  /  Alb  3.3  /  TBili  1.4[H]  /  DBili  x   /  AST  36  /  ALT  80[H]  /  AlkPhos  82  10-13                        11.7   17.79 )-----------( 330      ( 13 Oct 2024 05:46 )             36.9     Medications  MEDICATIONS  (STANDING):  albuterol/ipratropium for Nebulization 3 milliLiter(s) Nebulizer every 8 hours  aMIOdarone    Tablet 200 milliGRAM(s) Oral daily  aMIOdarone    Tablet   Oral   aspirin enteric coated 81 milliGRAM(s) Oral daily  chlorhexidine 2% Cloths 1 Application(s) Topical daily  dextrose 50% Injectable 50 milliLiter(s) IV Push every 15 minutes  furosemide    Tablet 40 milliGRAM(s) Oral two times a day  heparin   Injectable 5000 Unit(s) SubCutaneous every 12 hours  insulin glargine Injectable (LANTUS) 8 Unit(s) SubCutaneous at bedtime  insulin lispro (ADMELOG) corrective regimen sliding scale   SubCutaneous three times a day before meals  insulin lispro (ADMELOG) corrective regimen sliding scale   SubCutaneous at bedtime  insulin lispro Injectable (ADMELOG) 5 Unit(s) SubCutaneous three times a day before meals  levothyroxine 75 MICROGram(s) Oral daily  melatonin 5 milliGRAM(s) Oral at bedtime  metoprolol succinate ER 25 milliGRAM(s) Oral daily  piperacillin/tazobactam IVPB.. 3.375 Gram(s) IV Intermittent every 8 hours  sodium chloride 0.9%. 1000 milliLiter(s) (10 mL/Hr) IV Continuous <Continuous>  spironolactone 25 milliGRAM(s) Oral daily      Physical Exam  General: Patient comfortable in bed   Vital Signs Last 12 Hrs  T(F): 97.6 (10-13-24 @ 11:32), Max: 97.9 (10-13-24 @ 07:36)  HR: 73 (10-13-24 @ 11:32) (70 - 73)  BP: 112/58 (10-13-24 @ 11:32) (112/58 - 131/58)  BP(mean): 78 (10-13-24 @ 11:32) (78 - 84)  RR: 18 (10-13-24 @ 11:32) (18 - 18)  SpO2: 93% (10-13-24 @ 11:32) (92% - 96%)    CVS: S1S2   Respiratory: No wheezing, no crepitations  GI: Abdomen soft, bowel sounds positive  Musculoskeletal:  moves all extremities  : Voiding        Chief complaint  Patient is a 74y old  Female who presents with a chief complaint of CP (13 Oct 2024 12:01)     Labs and Fingersticks  CAPILLARY BLOOD GLUCOSE      POCT Blood Glucose.: 87 mg/dL (13 Oct 2024 11:33)  POCT Blood Glucose.: 104 mg/dL (13 Oct 2024 07:24)  POCT Blood Glucose.: 135 mg/dL (12 Oct 2024 21:20)  POCT Blood Glucose.: 125 mg/dL (12 Oct 2024 16:40)      Anion Gap: 16 (10-13 @ 05:47)  Anion Gap: 10 (10-12 @ 06:42)      Calcium: 8.3 *L* (10-13 @ 05:47)  Calcium: 8.4 (10-12 @ 06:42)  Albumin: 3.3 (10-13 @ 05:47)  Albumin: 3.5 (10-12 @ 06:42)    Alanine Aminotransferase (ALT/SGPT): 80 *H* (10-13 @ 05:47)  Alanine Aminotransferase (ALT/SGPT): 102 *H* (10-12 @ 06:42)  Alkaline Phosphatase: 82 (10-13 @ 05:47)  Alkaline Phosphatase: 91 (10-12 @ 06:42)  Aspartate Aminotransferase (AST/SGOT): 36 (10-13 @ 05:47)  Aspartate Aminotransferase (AST/SGOT): 33 (10-12 @ 06:42)        10-13    131[L]  |  88[L]  |  20  ----------------------------<  92  4.4   |  27  |  1.34[H]    Ca    8.3[L]      13 Oct 2024 05:47  Phos  3.4     10-13  Mg     2.2     10-13    TPro  7.2  /  Alb  3.3  /  TBili  1.4[H]  /  DBili  x   /  AST  36  /  ALT  80[H]  /  AlkPhos  82  10-13                        11.7   17.79 )-----------( 330      ( 13 Oct 2024 05:46 )             36.9     Medications  MEDICATIONS  (STANDING):  albuterol/ipratropium for Nebulization 3 milliLiter(s) Nebulizer every 8 hours  aMIOdarone    Tablet 200 milliGRAM(s) Oral daily  aMIOdarone    Tablet   Oral   aspirin enteric coated 81 milliGRAM(s) Oral daily  chlorhexidine 2% Cloths 1 Application(s) Topical daily  dextrose 50% Injectable 50 milliLiter(s) IV Push every 15 minutes  furosemide    Tablet 40 milliGRAM(s) Oral two times a day  heparin   Injectable 5000 Unit(s) SubCutaneous every 12 hours  insulin glargine Injectable (LANTUS) 8 Unit(s) SubCutaneous at bedtime  insulin lispro (ADMELOG) corrective regimen sliding scale   SubCutaneous three times a day before meals  insulin lispro (ADMELOG) corrective regimen sliding scale   SubCutaneous at bedtime  insulin lispro Injectable (ADMELOG) 5 Unit(s) SubCutaneous three times a day before meals  levothyroxine 75 MICROGram(s) Oral daily  melatonin 5 milliGRAM(s) Oral at bedtime  metoprolol succinate ER 25 milliGRAM(s) Oral daily  piperacillin/tazobactam IVPB.. 3.375 Gram(s) IV Intermittent every 8 hours  sodium chloride 0.9%. 1000 milliLiter(s) (10 mL/Hr) IV Continuous <Continuous>  spironolactone 25 milliGRAM(s) Oral daily      Physical Exam  General: Patient comfortable in bed   Vital Signs Last 12 Hrs  T(F): 97.6 (10-13-24 @ 11:32), Max: 97.9 (10-13-24 @ 07:36)  HR: 73 (10-13-24 @ 11:32) (70 - 73)  BP: 112/58 (10-13-24 @ 11:32) (112/58 - 131/58)  BP(mean): 78 (10-13-24 @ 11:32) (78 - 84)  RR: 18 (10-13-24 @ 11:32) (18 - 18)  SpO2: 93% (10-13-24 @ 11:32) (92% - 96%)    CVS: S1S2   Respiratory: No wheezing, no crepitations  GI: Abdomen soft, bowel sounds positive  Musculoskeletal:  moves all extremities  : Voiding

## 2024-10-13 NOTE — PROGRESS NOTE ADULT - ASSESSMENT
73 y/o female w/ PMH of hypothyroidism, CVA x2 (2018), HTN, HLD and DM who initially p/t [place] w/ worsening back pain, SOB, N/V and CP. She was transferred as a Tier I w/ c/f NSTEMI and hypotension (SBP 80s). Her son was at bedside and says she it typically mobile with her walker and has never had such back pain or CP. ECG with ST depressions I, II, aVL, V2-V5 and ST elevations aVR, III, V1. She was ASA and brilinta and heparin loaded at OSH. Pt. underwent R/LHC found to have severe LM and LAD disease and moderate pRCA disease. IABP placed for coronary perfusion and admitted to CICU for CABG eval. On arrival to unit pt. endorses nausea and had one episode of emesis relieved w/ antiemetics. (01 Oct 2024 03:06)  10/3/24 CABG, with SAMUEL , LIMA to LAD, SVG to OM,SVG to Diag  AGNIESZKA Clip   , IABP ef 45  Labile hemodynamics  volume resuscitation  pressor + inotrope  Extubated d 1, iabp d/c   wean , decrease in MVO2, increasd  PAF, amio load  Insulin infusion  10/5  off since 11 AM 10/5- inotrope weaned - later noted to have elevated Lactate, rising LFTs, milrinone restarted, also concern for pulm congestion - bumex drip started    , LFTs in 1000's  Started on milrinone , Bumex gtt and  Amio  d/tameka.  statin on hold for lfts   TTE organized clot adjacent RVOT  Zosyn/vanco for leukocytosis, cultures neg  10/8  Transferred sdu  10/9  d/c milrinone d/c mar Cont iv diuretics D/c hagan in am tomorrow  10/10 VSS MTI dressing d/c  hagan removed at 0800  DTV 1800 ambulated  klyte 25x2 k 3.8 sorbitol for  BM ambulated disposition to rehab  10/11 VSSbc 19 procalcitonin  0.20 BMx3 speech eval today   10/12 VSS ESR 46 afebrile passed bedside  swallow d/c pacing wires  today  Toprol 25 qd  10/13 VSS, afebrile Tmax 98, WBC 17, plan for d/c to [facility] Monday. Continue to encourage PO intake, tolerating regular diet.

## 2024-10-14 ENCOUNTER — TRANSCRIPTION ENCOUNTER (OUTPATIENT)
Age: 74
End: 2024-10-14

## 2024-10-14 VITALS
DIASTOLIC BLOOD PRESSURE: 56 MMHG | SYSTOLIC BLOOD PRESSURE: 115 MMHG | RESPIRATION RATE: 18 BRPM | HEART RATE: 72 BPM | OXYGEN SATURATION: 95 % | TEMPERATURE: 98 F

## 2024-10-14 LAB
ALBUMIN SERPL ELPH-MCNC: 3.2 G/DL — LOW (ref 3.3–5)
ALP SERPL-CCNC: 71 U/L — SIGNIFICANT CHANGE UP (ref 40–120)
ALT FLD-CCNC: 60 U/L — HIGH (ref 10–45)
ANION GAP SERPL CALC-SCNC: 9 MMOL/L — SIGNIFICANT CHANGE UP (ref 5–17)
AST SERPL-CCNC: 21 U/L — SIGNIFICANT CHANGE UP (ref 10–40)
BASOPHILS # BLD AUTO: 0.03 K/UL — SIGNIFICANT CHANGE UP (ref 0–0.2)
BASOPHILS NFR BLD AUTO: 0.2 % — SIGNIFICANT CHANGE UP (ref 0–2)
BILIRUB SERPL-MCNC: 1.1 MG/DL — SIGNIFICANT CHANGE UP (ref 0.2–1.2)
BUN SERPL-MCNC: 19 MG/DL — SIGNIFICANT CHANGE UP (ref 7–23)
CALCIUM SERPL-MCNC: 8.4 MG/DL — SIGNIFICANT CHANGE UP (ref 8.4–10.5)
CHLORIDE SERPL-SCNC: 90 MMOL/L — LOW (ref 96–108)
CO2 SERPL-SCNC: 33 MMOL/L — HIGH (ref 22–31)
CREAT SERPL-MCNC: 1.33 MG/DL — HIGH (ref 0.5–1.3)
EGFR: 42 ML/MIN/1.73M2 — LOW
EOSINOPHIL # BLD AUTO: 0.2 K/UL — SIGNIFICANT CHANGE UP (ref 0–0.5)
EOSINOPHIL NFR BLD AUTO: 1.3 % — SIGNIFICANT CHANGE UP (ref 0–6)
GLUCOSE BLDC GLUCOMTR-MCNC: 114 MG/DL — HIGH (ref 70–99)
GLUCOSE BLDC GLUCOMTR-MCNC: 141 MG/DL — HIGH (ref 70–99)
GLUCOSE SERPL-MCNC: 118 MG/DL — HIGH (ref 70–99)
HCT VFR BLD CALC: 31.6 % — LOW (ref 34.5–45)
HGB BLD-MCNC: 10.5 G/DL — LOW (ref 11.5–15.5)
IMM GRANULOCYTES NFR BLD AUTO: 0.7 % — SIGNIFICANT CHANGE UP (ref 0–0.9)
LYMPHOCYTES # BLD AUTO: 17 % — SIGNIFICANT CHANGE UP (ref 13–44)
LYMPHOCYTES # BLD AUTO: 2.53 K/UL — SIGNIFICANT CHANGE UP (ref 1–3.3)
MAGNESIUM SERPL-MCNC: 2.2 MG/DL — SIGNIFICANT CHANGE UP (ref 1.6–2.6)
MCHC RBC-ENTMCNC: 29.4 PG — SIGNIFICANT CHANGE UP (ref 27–34)
MCHC RBC-ENTMCNC: 33.2 GM/DL — SIGNIFICANT CHANGE UP (ref 32–36)
MCV RBC AUTO: 88.5 FL — SIGNIFICANT CHANGE UP (ref 80–100)
MONOCYTES # BLD AUTO: 1.18 K/UL — HIGH (ref 0–0.9)
MONOCYTES NFR BLD AUTO: 7.9 % — SIGNIFICANT CHANGE UP (ref 2–14)
NEUTROPHILS # BLD AUTO: 10.84 K/UL — HIGH (ref 1.8–7.4)
NEUTROPHILS NFR BLD AUTO: 72.9 % — SIGNIFICANT CHANGE UP (ref 43–77)
NRBC # BLD: 0 /100 WBCS — SIGNIFICANT CHANGE UP (ref 0–0)
PHOSPHATE SERPL-MCNC: 3.4 MG/DL — SIGNIFICANT CHANGE UP (ref 2.5–4.5)
PLATELET # BLD AUTO: 353 K/UL — SIGNIFICANT CHANGE UP (ref 150–400)
POTASSIUM SERPL-MCNC: 4 MMOL/L — SIGNIFICANT CHANGE UP (ref 3.5–5.3)
POTASSIUM SERPL-SCNC: 4 MMOL/L — SIGNIFICANT CHANGE UP (ref 3.5–5.3)
PROT SERPL-MCNC: 7 G/DL — SIGNIFICANT CHANGE UP (ref 6–8.3)
RBC # BLD: 3.57 M/UL — LOW (ref 3.8–5.2)
RBC # FLD: 15.9 % — HIGH (ref 10.3–14.5)
SODIUM SERPL-SCNC: 132 MMOL/L — LOW (ref 135–145)
WBC # BLD: 14.89 K/UL — HIGH (ref 3.8–10.5)
WBC # FLD AUTO: 14.89 K/UL — HIGH (ref 3.8–10.5)

## 2024-10-14 PROCEDURE — 93010 ELECTROCARDIOGRAM REPORT: CPT

## 2024-10-14 PROCEDURE — 71045 X-RAY EXAM CHEST 1 VIEW: CPT | Mod: 26

## 2024-10-14 RX ORDER — INSULIN LISPRO 100/ML
0 VIAL (ML) SUBCUTANEOUS
Qty: 0 | Refills: 0 | DISCHARGE
Start: 2024-10-14

## 2024-10-14 RX ORDER — ASPIRIN 325 MG
1 TABLET ORAL
Qty: 0 | Refills: 0 | DISCHARGE
Start: 2024-10-14

## 2024-10-14 RX ORDER — ATORVASTATIN CALCIUM 10 MG/1
1 TABLET, FILM COATED ORAL
Refills: 0 | DISCHARGE

## 2024-10-14 RX ORDER — DICLOFENAC SODIUM 75 MG
1 TABLET, DELAYED RELEASE (ENTERIC COATED) ORAL
Refills: 0 | DISCHARGE

## 2024-10-14 RX ORDER — INSULIN GLARGINE 300 U/ML
8 INJECTION, SOLUTION SUBCUTANEOUS
Qty: 0 | Refills: 0 | DISCHARGE
Start: 2024-10-14

## 2024-10-14 RX ORDER — FUROSEMIDE 10 MG/ML
1 INJECTION INTRAVENOUS
Qty: 0 | Refills: 0 | DISCHARGE
Start: 2024-10-14

## 2024-10-14 RX ORDER — ASPIRIN 325 MG
0 TABLET ORAL
Refills: 0 | DISCHARGE

## 2024-10-14 RX ORDER — OXYCODONE HYDROCHLORIDE 30 MG/1
1 TABLET, FILM COATED, EXTENDED RELEASE ORAL
Qty: 0 | Refills: 0 | DISCHARGE
Start: 2024-10-14

## 2024-10-14 RX ORDER — METOPROLOL TARTRATE 50 MG
1 TABLET ORAL
Qty: 0 | Refills: 0 | DISCHARGE
Start: 2024-10-14

## 2024-10-14 RX ORDER — 5-HYDROXYTRYPTOPHAN (5-HTP) 100 MG
1 TABLET,DISINTEGRATING ORAL
Qty: 0 | Refills: 0 | DISCHARGE
Start: 2024-10-14

## 2024-10-14 RX ORDER — INSULIN LISPRO 100/ML
5 VIAL (ML) SUBCUTANEOUS
Qty: 0 | Refills: 0 | DISCHARGE
Start: 2024-10-14

## 2024-10-14 RX ORDER — INSULIN GLARGINE 300 U/ML
46 INJECTION, SOLUTION SUBCUTANEOUS
Refills: 0 | DISCHARGE

## 2024-10-14 RX ORDER — SPIRONOLACTONE 100 MG/1
1 TABLET, FILM COATED ORAL
Qty: 0 | Refills: 0 | DISCHARGE
Start: 2024-10-14

## 2024-10-14 RX ADMIN — Medication 81 MILLIGRAM(S): at 13:09

## 2024-10-14 RX ADMIN — FUROSEMIDE 40 MILLIGRAM(S): 10 INJECTION INTRAVENOUS at 13:08

## 2024-10-14 RX ADMIN — IPRATROPIUM BROMIDE AND ALBUTEROL SULFATE 3 MILLILITER(S): .5; 3 SOLUTION RESPIRATORY (INHALATION) at 05:58

## 2024-10-14 RX ADMIN — Medication 25 MILLIGRAM(S): at 05:58

## 2024-10-14 RX ADMIN — Medication 5000 UNIT(S): at 05:58

## 2024-10-14 RX ADMIN — Medication 5 UNIT(S): at 07:52

## 2024-10-14 RX ADMIN — CHLORHEXIDINE GLUCONATE ORAL RINSE 1 APPLICATION(S): 1.2 SOLUTION DENTAL at 11:50

## 2024-10-14 RX ADMIN — SPIRONOLACTONE 25 MILLIGRAM(S): 100 TABLET, FILM COATED ORAL at 05:58

## 2024-10-14 RX ADMIN — Medication 5 UNIT(S): at 12:29

## 2024-10-14 RX ADMIN — FUROSEMIDE 40 MILLIGRAM(S): 10 INJECTION INTRAVENOUS at 05:58

## 2024-10-14 RX ADMIN — Medication 75 MICROGRAM(S): at 05:58

## 2024-10-14 RX ADMIN — PIPERACILLIN SODIUM AND TAZOBACTAM SODIUM 25 GRAM(S): 12; 1.5 INJECTION, POWDER, LYOPHILIZED, FOR SOLUTION INTRAVENOUS at 05:58

## 2024-10-14 RX ADMIN — AMIODARONE HYDROCHLORIDE 200 MILLIGRAM(S): 50 INJECTION, SOLUTION INTRAVENOUS at 07:53

## 2024-10-14 NOTE — PROGRESS NOTE ADULT - PROBLEM SELECTOR PROBLEM 1
DM2 (diabetes mellitus, type 2)
S/P CABG x 3
DM2 (diabetes mellitus, type 2)
DM2 (diabetes mellitus, type 2)

## 2024-10-14 NOTE — PROGRESS NOTE ADULT - PROVIDER SPECIALTY LIST ADULT
CT Surgery
Critical Care
Endocrinology
MADHURI
Critical Care
Endocrinology
MADHURI
CT Surgery
Critical Care
Critical Care
CT Surgery
MADHURI
MADHURI
CT Surgery
Endocrinology

## 2024-10-14 NOTE — PROGRESS NOTE ADULT - ASSESSMENT
75 y/o female w/ PMH of hypothyroidism, CVA x2 (2018), HTN, HLD and DM on home insulins, now s/p cabg    Assessment  DMT2: 74y Female with DM T2 with hyperglycemia, A1C 6.8% , was on basal insulin at home, now on basal bolus insulin with coverage, blood sugars  trending down,  had hypoglycemic event.  Will need tight glycemic control for postop wound healing.   CAD: on medications, stable, monitored. s/p cabg   Hypothyroidism: On Synthroid 75 mcg po daily, compliant with Synthroid intake, asymptomatic. TFTs euthyroid, FT4 1.9 , rpt FT4   HTN: on antihypertensive medications, monitored, asymptomatic.      Discussed plan and management with Dr Kandace Melo NP - TEAMS  Rachid Jeronimo MD  Cell: [phone]  Office: [phone]              75 y/o female w/ PMH of hypothyroidism, CVA x2 (2018), HTN, HLD and DM on home insulins, now s/p cabg      Assessment  DMT2: 74y Female with DM T2 with hyperglycemia, A1C 6.8% , was on basal insulin at home, now on basal bolus insulin with coverage, blood sugars  trending down,  had hypoglycemic event.  Will need tight glycemic control for postop wound healing.   CAD: on medications, stable, monitored. s/p cabg   Hypothyroidism: On Synthroid 75 mcg po daily, compliant with Synthroid intake, asymptomatic. TFTs euthyroid, FT4 1.9 , rpt FT4   HTN: on antihypertensive medications, monitored, asymptomatic.      Discussed plan and management with Dr Kandace Melo NP - TEAMS  Rachid Jeronimo MD  Cell: [phone]  Office: [phone]

## 2024-10-14 NOTE — DISCHARGE NOTE PROVIDER - NSDCFUADDAPPT_GEN_ALL_CORE_FT
CALL DR TRAORE FOR AN APPOINTMENT UPON DISCHARGE FROM REHAB    CALL DR GHOTRA FOR AN APPOINTMENT AFTER DISCHARGE FROM REHAB  Check your glucose before meals and at bedtime and write it down.  Bring those results to your  endocrinology or primary care doctor appointment .     CALL YOUR CARDIOLOGIST  AND OR PRIMARY CARE DOCTOR

## 2024-10-14 NOTE — PROGRESS NOTE ADULT - NUTRITIONAL ASSESSMENT
This patient has been assessed with a concern for Malnutrition and has been determined to have a diagnosis/diagnoses of Mild protein-calorie malnutrition.    This patient is being managed with:   Diet Regular-  Consistent Carbohydrate {No Snacks} (CSTCHO)  1000mL Fluid Restriction ([id number])  Entered: Oct  9 2024  2:14AM  
This patient has been assessed with a concern for Malnutrition and has been determined to have a diagnosis/diagnoses of Mild protein-calorie malnutrition.    This patient is being managed with:   Diet Regular-  Consistent Carbohydrate {No Snacks} (CSTCHO)  1000mL Fluid Restriction ([id number])  Entered: Oct  9 2024  2:14AM  
Diet, NPO after Midnight:      NPO Start Date: 02-Oct-2024,   NPO Start Time: 23:59  Except Medications     Special Instructions for Nursing:  Except Medications (10-02-24 @ 10:52) [Active]  Diet, NPO after Midnight:      NPO Start Date: 02-Oct-2024,   NPO Start Time: 23:59 (10-02-24 @ 09:41) [Active]  Diet, Consistent Carbohydrate w/Evening Snack (10-01-24 @ 14:09) [Active]
This patient has been assessed with a concern for Malnutrition and has been determined to have a diagnosis/diagnoses of Mild protein-calorie malnutrition.    This patient is being managed with:   Diet Regular-  Consistent Carbohydrate {No Snacks} (CSTCHO)  1000mL Fluid Restriction ([id number])  Entered: Oct  9 2024  2:14AM  
This patient has been assessed with a concern for Malnutrition and has been determined to have a diagnosis/diagnoses of Mild protein-calorie malnutrition.    This patient is being managed with:   Diet Regular-  Consistent Carbohydrate {No Snacks} (CSTCHO)  1000mL Fluid Restriction ([id number])  Entered: Oct  9 2024  2:14AM

## 2024-10-14 NOTE — DISCHARGE NOTE PROVIDER - DETAILS OF MALNUTRITION DIAGNOSIS/DIAGNOSES
This patient has been assessed with a concern for Malnutrition and was treated during this hospitalization for the following Nutrition diagnosis/diagnoses:     -  10/10/2024: Mild protein-calorie malnutrition

## 2024-10-14 NOTE — DISCHARGE NOTE NURSING/CASE MANAGEMENT/SOCIAL WORK - PATIENT PORTAL LINK FT
You can access the FollowMyHealth Patient Portal offered by [facility] by registering at the following website: [email or web address]. By joining [facility]’s FollowMyHealth portal, you will also be able to view your health information using other applications (apps) compatible with our system.

## 2024-10-14 NOTE — DISCHARGE NOTE PROVIDER - HOSPITAL COURSE
75 y/o female w/ PMH of hypothyroidism, CVA x2 (2018), HTN, HLD and DM who initially p/t [place] w/ worsening back pain, SOB, N/V and CP. She was transferred as a Tier I w/ c/f NSTEMI and hypotension (SBP 80s). Her son was at bedside and says she it typically mobile with her walker and has never had such back pain or CP. ECG with ST depressions I, II, aVL, V2-V5 and ST elevations aVR, III, V1. She was ASA and brilinta and heparin loaded at OSH. Pt. underwent R/LHC found to have severe LM and LAD disease and moderate pRCA disease. IABP placed for coronary perfusion and admitted to CICU for CABG eval. On arrival to unit pt. endorses nausea and had one episode of emesis relieved w/ antiemetics. (01 Oct 2024 03:06)  10/3/24 CABG, with SAMUEL , LIMA to LAD, SVG to OM,SVG to Diag  AGNIESZKA Clip   , IABP ef 45  Labile hemodynamics  volume resuscitation  pressor + inotrope  Extubated d 1, iabp d/c   wean , decrease in MVO2, increasd  PAF, amio load  Insulin infusion  10/5  off since 11 AM 10/5- inotrope weaned - later noted to have elevated Lactate, rising LFTs, milrinone restarted, also concern for pulm congestion - bumex drip started    , LFTs in 1000's  Started on milrinone , Bumex gtt and  Amio  d/tameka.  statin on hold for lfts   TTE organized clot adjacent RVOT  Zosyn/vanco for leukocytosis, cultures neg  10/8  Transferred sdu  10/9  d/c milrinone d/c mar Cont iv diuretics D/c hagan in am tomorrow  10/10 VSS MTI dressing d/c  hagan removed at 0800  DTV 1800 ambulated  klyte 25x2 k 3.8 sorbitol for  BM ambulated disposition to rehab  10/11 VSSbc 19 procalcitonin  0.20 BMx3 speech eval today   10/12 VSS ESR 46 afebrile passed bedside  swallow d/c pacing wires  today  Toprol 25 qd  10/13 VSS, afebrile Tmax 98, WBC 17, plan for d/c to [facility] Monday. Continue to encourage PO intake, tolerating regular diet.   10/14 Elevated lfts , trending down, remains off statin until return to baseline  WBC trending down, afebrile, D/c to rehab , as per DR TRAORE

## 2024-10-14 NOTE — PROGRESS NOTE ADULT - PROBLEM SELECTOR PLAN 3
, Will continue current Synthroid dose, will FU.  Suggest to repeat thyroid function test in 4-6 weeks. Outpatient follow up.

## 2024-10-14 NOTE — DISCHARGE NOTE PROVIDER - NSDCMRMEDTOKEN_GEN_ALL_CORE_FT
aspirin 81 mg oral delayed release tablet: 1 tab(s) orally once a day  furosemide 40 mg oral tablet: 1 tab(s) orally 2 times a day decrease to daily in 7 days  insulin glargine 100 units/mL subcutaneous solution: 8 unit(s) subcutaneous once a day (at bedtime)  insulin lispro 100 units/mL injectable solution: 5 unit(s) subcutaneous 3 times a day (before meals)  insulin lispro 100 units/mL injectable solution: subcutaneous 4 times a day (before meals and at bedtime) 2 Unit(s) if Glucose 251 - 300  4 Unit(s) if Glucose 301 - 350  6 Unit(s) if Glucose 351 - 400  8 Unit(s) if Glucose Greater Than 400  levothyroxine 75 mcg (0.075 mg) oral tablet: 1 tab(s) orally once a day  melatonin 5 mg oral tablet: 1 tab(s) orally once a day (at bedtime)  metoprolol succinate 25 mg oral tablet, extended release: 1 tab(s) orally once a day  oxyCODONE 5 mg oral tablet: 1 tab(s) orally every 4 hours As needed Moderate Pain (4 - 6)  spironolactone 25 mg oral tablet: 1 tab(s) orally once a day   aspirin 81 mg oral delayed release tablet: 1 tab(s) orally once a day  furosemide 40 mg oral tablet: 1 tab(s) orally 2 times a day  insulin glargine 100 units/mL subcutaneous solution: 8 unit(s) subcutaneous once a day (at bedtime)  insulin lispro 100 units/mL injectable solution: 5 unit(s) subcutaneous 3 times a day (before meals)  insulin lispro 100 units/mL injectable solution: subcutaneous 4 times a day (before meals and at bedtime) 2 Unit(s) if Glucose 251 - 300  4 Unit(s) if Glucose 301 - 350  6 Unit(s) if Glucose 351 - 400  8 Unit(s) if Glucose Greater Than 400  levothyroxine 75 mcg (0.075 mg) oral tablet: 1 tab(s) orally once a day  melatonin 5 mg oral tablet: 1 tab(s) orally once a day (at bedtime)  metoprolol succinate 25 mg oral tablet, extended release: 1 tab(s) orally once a day  oxyCODONE 5 mg oral tablet: 1 tab(s) orally every 4 hours As needed Moderate Pain (4 - 6)  spironolactone 25 mg oral tablet: 1 tab(s) orally once a day

## 2024-10-14 NOTE — PROGRESS NOTE ADULT - PROBLEM SELECTOR PLAN 2
cont abx.   F/u cultures
WBC 20    10/6 F/u cultures NGTD    10/7 Zosyn  3.375 TID   10/10 hagan d/c   ua to folow
On medications,  no chest pain, stable, monitored and followed up by primary cardiothoracic team/cardiology team.
WBC 20    10/6 F/u cultures NGTD    10/7 Zosyn  3.375 TID   10/10 hagan d/c   10/11 pro benny 0.20  ua to follow
On medications,  no chest pain, stable, monitored and followed up by primary cardiothoracic team/cardiology team.
WBC 20    10/6 F/u cultures NGTD    10/7 Zosyn  3.375 TID   10/10 hagan d/c   10/11 pro benny 0.20  ua to follow- negative
Trend WBC on daily CBC  Continue empiric Zosyn  3.375 TID   10/6 F/u cultures NGTD   10/10 hagan d/c   10/11 pro benny 0.20  ua negative
On medications,  no chest pain, stable, monitored and followed up by primary cardiothoracic team/cardiology team.
cont abx.   F/u cultures
On medications,  no chest pain, stable, monitored and followed up by primary cardiothoracic team/cardiology team.

## 2024-10-14 NOTE — PROGRESS NOTE ADULT - SUBJECTIVE AND OBJECTIVE BOX
Chief complaint  Patient is a 74y old  Female who presents with a chief complaint of CP (14 Oct 2024 11:50)         Labs and Fingersticks  CAPILLARY BLOOD GLUCOSE      POCT Blood Glucose.: 114 mg/dL (14 Oct 2024 11:52)  POCT Blood Glucose.: 141 mg/dL (14 Oct 2024 07:44)  POCT Blood Glucose.: 178 mg/dL (13 Oct 2024 21:03)  POCT Blood Glucose.: 155 mg/dL (13 Oct 2024 16:29)      Anion Gap: 9 (10-14 @ 06:23)  Anion Gap: 16 (10-13 @ 05:47)      Calcium: 8.4 (10-14 @ 06:23)  Calcium: 8.3 *L* (10-13 @ 05:47)  Albumin: 3.2 *L* (10-14 @ 06:23)  Albumin: 3.3 (10-13 @ 05:47)    Alanine Aminotransferase (ALT/SGPT): 60 *H* (10-14 @ 06:23)  Alanine Aminotransferase (ALT/SGPT): 80 *H* (10-13 @ 05:47)  Alkaline Phosphatase: 71 (10-14 @ 06:23)  Alkaline Phosphatase: 82 (10-13 @ 05:47)  Aspartate Aminotransferase (AST/SGOT): 21 (10-14 @ 06:23)  Aspartate Aminotransferase (AST/SGOT): 36 (10-13 @ 05:47)        10-14    132[L]  |  90[L]  |  19  ----------------------------<  118[H]  4.0   |  33[H]  |  1.33[H]    Ca    8.4      14 Oct 2024 06:23  Phos  3.4     10-14  Mg     2.2     10-14    TPro  7.0  /  Alb  3.2[L]  /  TBili  1.1  /  DBili  x   /  AST  21  /  ALT  60[H]  /  AlkPhos  71  10-14                        10.5   14.89 )-----------( 353      ( 14 Oct 2024 06:22 )             31.6     Medications  MEDICATIONS  (STANDING):  albuterol/ipratropium for Nebulization 3 milliLiter(s) Nebulizer every 8 hours  aMIOdarone    Tablet   Oral   aMIOdarone    Tablet 200 milliGRAM(s) Oral daily  aspirin enteric coated 81 milliGRAM(s) Oral daily  chlorhexidine 2% Cloths 1 Application(s) Topical daily  dextrose 50% Injectable 50 milliLiter(s) IV Push every 15 minutes  furosemide    Tablet 40 milliGRAM(s) Oral two times a day  heparin   Injectable 5000 Unit(s) SubCutaneous every 12 hours  insulin glargine Injectable (LANTUS) 8 Unit(s) SubCutaneous at bedtime  insulin lispro (ADMELOG) corrective regimen sliding scale   SubCutaneous three times a day before meals  insulin lispro (ADMELOG) corrective regimen sliding scale   SubCutaneous at bedtime  insulin lispro Injectable (ADMELOG) 5 Unit(s) SubCutaneous three times a day before meals  levothyroxine 75 MICROGram(s) Oral daily  melatonin 5 milliGRAM(s) Oral at bedtime  metoprolol succinate ER 25 milliGRAM(s) Oral daily  sodium chloride 0.9%. 1000 milliLiter(s) (10 mL/Hr) IV Continuous <Continuous>  spironolactone 25 milliGRAM(s) Oral daily      Physical Exam  General: Patient comfortable in bed   Vital Signs Last 12 Hrs  T(F): 97.9 (10-14-24 @ 11:30), Max: 98.1 (10-14-24 @ 02:49)  HR: 72 (10-14-24 @ 11:30) (72 - 74)  BP: 114/56 (10-14-24 @ 11:30) (112/56 - 116/55)  BP(mean): 80 (10-14-24 @ 11:30) (79 - 85)  RR: 18 (10-14-24 @ 11:30) (18 - 18)  SpO2: 96% (10-14-24 @ 11:30) (92% - 96%)    CVS: S1S2   Respiratory: No wheezing, no crepitations  GI: Abdomen soft, bowel sounds positive  Musculoskeletal:  moves all extremities  : Voiding          Chief complaint  Patient is a 74y old  Female who presents with a chief complaint of CP (14 Oct 2024 11:50)       Labs and Fingersticks  CAPILLARY BLOOD GLUCOSE      POCT Blood Glucose.: 114 mg/dL (14 Oct 2024 11:52)  POCT Blood Glucose.: 141 mg/dL (14 Oct 2024 07:44)  POCT Blood Glucose.: 178 mg/dL (13 Oct 2024 21:03)  POCT Blood Glucose.: 155 mg/dL (13 Oct 2024 16:29)      Anion Gap: 9 (10-14 @ 06:23)  Anion Gap: 16 (10-13 @ 05:47)      Calcium: 8.4 (10-14 @ 06:23)  Calcium: 8.3 *L* (10-13 @ 05:47)  Albumin: 3.2 *L* (10-14 @ 06:23)  Albumin: 3.3 (10-13 @ 05:47)    Alanine Aminotransferase (ALT/SGPT): 60 *H* (10-14 @ 06:23)  Alanine Aminotransferase (ALT/SGPT): 80 *H* (10-13 @ 05:47)  Alkaline Phosphatase: 71 (10-14 @ 06:23)  Alkaline Phosphatase: 82 (10-13 @ 05:47)  Aspartate Aminotransferase (AST/SGOT): 21 (10-14 @ 06:23)  Aspartate Aminotransferase (AST/SGOT): 36 (10-13 @ 05:47)        10-14    132[L]  |  90[L]  |  19  ----------------------------<  118[H]  4.0   |  33[H]  |  1.33[H]    Ca    8.4      14 Oct 2024 06:23  Phos  3.4     10-14  Mg     2.2     10-14    TPro  7.0  /  Alb  3.2[L]  /  TBili  1.1  /  DBili  x   /  AST  21  /  ALT  60[H]  /  AlkPhos  71  10-14                        10.5   14.89 )-----------( 353      ( 14 Oct 2024 06:22 )             31.6     Medications  MEDICATIONS  (STANDING):  albuterol/ipratropium for Nebulization 3 milliLiter(s) Nebulizer every 8 hours  aMIOdarone    Tablet   Oral   aMIOdarone    Tablet 200 milliGRAM(s) Oral daily  aspirin enteric coated 81 milliGRAM(s) Oral daily  chlorhexidine 2% Cloths 1 Application(s) Topical daily  dextrose 50% Injectable 50 milliLiter(s) IV Push every 15 minutes  furosemide    Tablet 40 milliGRAM(s) Oral two times a day  heparin   Injectable 5000 Unit(s) SubCutaneous every 12 hours  insulin glargine Injectable (LANTUS) 8 Unit(s) SubCutaneous at bedtime  insulin lispro (ADMELOG) corrective regimen sliding scale   SubCutaneous three times a day before meals  insulin lispro (ADMELOG) corrective regimen sliding scale   SubCutaneous at bedtime  insulin lispro Injectable (ADMELOG) 5 Unit(s) SubCutaneous three times a day before meals  levothyroxine 75 MICROGram(s) Oral daily  melatonin 5 milliGRAM(s) Oral at bedtime  metoprolol succinate ER 25 milliGRAM(s) Oral daily  sodium chloride 0.9%. 1000 milliLiter(s) (10 mL/Hr) IV Continuous <Continuous>  spironolactone 25 milliGRAM(s) Oral daily      Physical Exam  General: Patient comfortable in bed   Vital Signs Last 12 Hrs  T(F): 97.9 (10-14-24 @ 11:30), Max: 98.1 (10-14-24 @ 02:49)  HR: 72 (10-14-24 @ 11:30) (72 - 74)  BP: 114/56 (10-14-24 @ 11:30) (112/56 - 116/55)  BP(mean): 80 (10-14-24 @ 11:30) (79 - 85)  RR: 18 (10-14-24 @ 11:30) (18 - 18)  SpO2: 96% (10-14-24 @ 11:30) (92% - 96%)    CVS: S1S2   Respiratory: No wheezing, no crepitations  GI: Abdomen soft, bowel sounds positive  Musculoskeletal:  moves all extremities  : Voiding

## 2024-10-14 NOTE — PROGRESS NOTE ADULT - PROBLEM SELECTOR PROBLEM 2
CAD (coronary artery disease)
Leukocytosis
CAD (coronary artery disease)
Leukocytosis
CAD (coronary artery disease)
Leukocytosis
Leukocytosis
CAD (coronary artery disease)

## 2024-10-14 NOTE — DISCHARGE NOTE PROVIDER - CARE PROVIDER_API CALL
Rachid Jeronimo)  Endocrinology/Metab/Diabetes  [address] [place] [address]  Phone: [phone]  Fax: [phone]  Follow Up Time: 2 weeks    Rafael Martini  Thoracic and Cardiac Surgery  [address] [place] [id number]  Phone: [phone]  Fax: [phone]  Follow Up Time:

## 2024-10-14 NOTE — DISCHARGE NOTE PROVIDER - NSDCPNSUBOBJ_GEN_ALL_CORE
VITAL SIGNS    Telemetry:      Vital Signs Last 24 Hrs  T(C): 36.6 (10-14-24 @ 11:30), Max: 36.7 (10-13-24 @ 15:16)  T(F): 97.9 (10-14-24 @ 11:30), Max: 98.1 (10-13-24 @ 15:16)  HR: 72 (10-14-24 @ 11:30) (72 - 77)  BP: 114/56 (10-14-24 @ 11:30) (112/56 - 122/60)  RR: 18 (10-14-24 @ 11:30) (18 - 18)  SpO2: 96% (10-14-24 @ 11:30) (92% - 96%)                   10-13 @ 07:01  -  10-14 @ 07:00  --------------------------------------------------------  IN: 1000 mL / OUT: 1625 mL / NET: -625 mL          Daily     Daily Weight in k.3 (14 Oct 2024 07:56)            CAPILLARY BLOOD GLUCOSE      POCT Blood Glucose.: 114 mg/dL (14 Oct 2024 11:52)  POCT Blood Glucose.: 141 mg/dL (14 Oct 2024 07:44)  POCT Blood Glucose.: 178 mg/dL (13 Oct 2024 21:03)  POCT Blood Glucose.: 155 mg/dL (13 Oct 2024 16:29)            Drains:     MS         [  ] Drainage:                 L Pleural  [  ]  Drainage:                R Pleural  [  ]  Drainage:    Pacing Wires        [  ]   Settings:                                  Isolated  [  ]    Coumadin    [ ] YES          [  ]      NO                                   PHYSICAL EXAM        Neurology: alert and oriented x 3, nonfocal, no gross deficits  CV : s1 s2 RRR  Sternal Wound :  CDI , Stable  Lungs: cta  Abdomen: soft, nontender, nondistended, positive bowel sounds, last bowel movement +                      :    voiding        Extremities:     +edema   /  -   calve tenderness ,      R leg  incisions cdi, echymotic                            Physical Therapy Rec:   Home  [  ]   Home w/ PT  [  ]  Rehab  [ x]  Discussed with Cardiothoracic Team at AM rounds.

## 2024-10-14 NOTE — DISCHARGE NOTE PROVIDER - NSDCFUADDINST_GEN_ALL_CORE_FT
Follow up LFT's   to resume statin when at baseline    Check your glucose before meals and at bedtime and write it down.  Bring those results to your  endocrinology or primary care doctor appointment .     1. Daily Shower  2. Weight yourself daily and notify any weight gain greater than 2-3 pounds in 24 hours.  3. Regular diabetic diet - low fat, low cholesterol, no added salt.  4. Cleanse Midsternal incision and leg incision daily while showering with warm water and mild soap, pat dry and maintain open to air.   5. Follow Cardiac Surgery Do's and Don'ts discharge instructions.   6. No driving until cleared by MD.   7. No heavy lifting nothing greater than 5 pounds until cleared by MD.   8. Call / Notify MD any fever greater than 101.0  9. Increase Activity as tolerated.

## 2024-10-17 PROBLEM — Z00.00 ENCOUNTER FOR PREVENTIVE HEALTH EXAMINATION: Status: ACTIVE | Noted: 2024-10-17

## 2024-10-24 ENCOUNTER — TRANSCRIPTION ENCOUNTER (OUTPATIENT)
Age: 74
End: 2024-10-24

## 2024-10-24 PROBLEM — Z95.1 S/P CABG X 3: Status: ACTIVE | Noted: 2024-10-24

## 2024-10-24 RX ORDER — METOPROLOL SUCCINATE 25 MG/1
25 TABLET, EXTENDED RELEASE ORAL DAILY
Qty: 30 | Refills: 0 | Status: ACTIVE | COMMUNITY
Start: 2024-10-24 | End: 1900-01-01

## 2024-10-24 RX ORDER — ADHESIVE TAPE 3"X 2.3 YD
5 TAPE, NON-MEDICATED TOPICAL DAILY
Refills: 0 | Status: ACTIVE | COMMUNITY

## 2024-10-24 RX ORDER — INSULIN GLARGINE 100 [IU]/ML
100 INJECTION, SOLUTION SUBCUTANEOUS AT BEDTIME
Refills: 0 | Status: ACTIVE | COMMUNITY

## 2024-10-24 RX ORDER — AMIODARONE HYDROCHLORIDE 200 MG/1
200 TABLET ORAL DAILY
Qty: 20 | Refills: 0 | Status: ACTIVE | COMMUNITY
Start: 2024-10-24 | End: 1900-01-01

## 2024-10-24 RX ORDER — LEVOTHYROXINE SODIUM 0.07 MG/1
75 TABLET ORAL DAILY
Refills: 0 | Status: ACTIVE | COMMUNITY

## 2024-10-24 RX ORDER — SPIRONOLACTONE 25 MG/1
25 TABLET ORAL DAILY
Refills: 0 | Status: ACTIVE | COMMUNITY

## 2024-10-25 ENCOUNTER — TRANSCRIPTION ENCOUNTER (OUTPATIENT)
Age: 74
End: 2024-10-25

## 2024-10-25 ENCOUNTER — APPOINTMENT (OUTPATIENT)
Dept: CARE COORDINATION | Facility: HOME HEALTH | Age: 74
End: 2024-10-25
Payer: MEDICARE

## 2024-10-25 VITALS
DIASTOLIC BLOOD PRESSURE: 62 MMHG | SYSTOLIC BLOOD PRESSURE: 110 MMHG | OXYGEN SATURATION: 97 % | RESPIRATION RATE: 17 BRPM | HEART RATE: 82 BPM

## 2024-10-25 PROCEDURE — 99024 POSTOP FOLLOW-UP VISIT: CPT

## 2024-10-25 RX ORDER — OXYCODONE 5 MG/1
5 TABLET ORAL EVERY 4 HOURS
Refills: 0 | Status: DISCONTINUED | COMMUNITY
End: 2024-10-25

## 2024-10-25 RX ORDER — FUROSEMIDE 40 MG/1
40 TABLET ORAL DAILY
Refills: 0 | Status: ACTIVE | COMMUNITY

## 2024-10-25 RX ORDER — EMPAGLIFLOZIN 10 MG/1
10 TABLET, FILM COATED ORAL
Refills: 0 | Status: ACTIVE | COMMUNITY

## 2024-10-25 RX ORDER — DOXYCYCLINE HYCLATE 100 MG/1
100 TABLET ORAL
Qty: 14 | Refills: 0 | Status: ACTIVE | COMMUNITY
Start: 2024-10-25 | End: 1900-01-01

## 2024-10-25 RX ORDER — INSULIN LISPRO 100 [IU]/ML
100 INJECTION, SOLUTION INTRAVENOUS; SUBCUTANEOUS 3 TIMES DAILY
Refills: 0 | Status: DISCONTINUED | COMMUNITY
End: 2024-10-25

## 2024-10-31 ENCOUNTER — NON-APPOINTMENT (OUTPATIENT)
Age: 74
End: 2024-10-31

## 2024-10-31 ENCOUNTER — APPOINTMENT (OUTPATIENT)
Dept: CARDIOTHORACIC SURGERY | Facility: CLINIC | Age: 74
End: 2024-10-31
Payer: MEDICARE

## 2024-10-31 VITALS
BODY MASS INDEX: 25.1 KG/M2 | HEART RATE: 87 BPM | RESPIRATION RATE: 14 BRPM | HEIGHT: 64 IN | SYSTOLIC BLOOD PRESSURE: 104 MMHG | WEIGHT: 147 LBS | DIASTOLIC BLOOD PRESSURE: 61 MMHG | OXYGEN SATURATION: 97 %

## 2024-10-31 LAB
ALBUMIN SERPL ELPH-MCNC: 3.9 G/DL
ALP BLD-CCNC: 147 U/L
ALT SERPL-CCNC: 9 U/L
ANION GAP SERPL CALC-SCNC: 13 MMOL/L
AST SERPL-CCNC: 14 U/L
BILIRUB SERPL-MCNC: 0.7 MG/DL
BUN SERPL-MCNC: 29 MG/DL
CALCIUM SERPL-MCNC: 9.6 MG/DL
CHLORIDE SERPL-SCNC: 93 MMOL/L
CO2 SERPL-SCNC: 27 MMOL/L
CREAT SERPL-MCNC: 1.26 MG/DL
EGFR: 45 ML/MIN/1.73M2
GLUCOSE SERPL-MCNC: 149 MG/DL
HCT VFR BLD CALC: 37.2 %
HGB BLD-MCNC: 12.4 G/DL
MCHC RBC-ENTMCNC: 28.8 PG
MCHC RBC-ENTMCNC: 33.3 G/DL
MCV RBC AUTO: 86.5 FL
NT-PROBNP SERPL-MCNC: 2368 PG/ML
PLATELET # BLD AUTO: 283 K/UL
POTASSIUM SERPL-SCNC: 4.1 MMOL/L
PROT SERPL-MCNC: 9 G/DL
RBC # BLD: 4.3 M/UL
RBC # FLD: 14.8 %
SODIUM SERPL-SCNC: 133 MMOL/L
WBC # FLD AUTO: 10.2 K/UL

## 2024-10-31 PROCEDURE — 99024 POSTOP FOLLOW-UP VISIT: CPT

## 2024-10-31 RX ORDER — METOPROLOL SUCCINATE 25 MG/1
25 TABLET, EXTENDED RELEASE ORAL DAILY
Refills: 0 | Status: COMPLETED | COMMUNITY
End: 2024-10-31

## 2024-11-07 ENCOUNTER — APPOINTMENT (OUTPATIENT)
Dept: CARDIOTHORACIC SURGERY | Facility: CLINIC | Age: 74
End: 2024-11-07
Payer: MEDICARE

## 2024-11-07 VITALS
WEIGHT: 147 LBS | TEMPERATURE: 97.8 F | HEART RATE: 93 BPM | DIASTOLIC BLOOD PRESSURE: 64 MMHG | HEIGHT: 64 IN | SYSTOLIC BLOOD PRESSURE: 91 MMHG | BODY MASS INDEX: 25.1 KG/M2 | OXYGEN SATURATION: 91 % | RESPIRATION RATE: 14 BRPM

## 2024-11-07 DIAGNOSIS — Z95.1 PRESENCE OF AORTOCORONARY BYPASS GRAFT: ICD-10-CM

## 2024-11-07 PROCEDURE — 99024 POSTOP FOLLOW-UP VISIT: CPT

## 2024-11-12 ENCOUNTER — TRANSCRIPTION ENCOUNTER (OUTPATIENT)
Age: 74
End: 2024-11-12

## 2024-11-21 ENCOUNTER — APPOINTMENT (OUTPATIENT)
Dept: CARDIOTHORACIC SURGERY | Facility: CLINIC | Age: 74
End: 2024-11-21
Payer: MEDICARE

## 2024-11-21 VITALS
TEMPERATURE: 97.9 F | SYSTOLIC BLOOD PRESSURE: 110 MMHG | BODY MASS INDEX: 25.27 KG/M2 | OXYGEN SATURATION: 100 % | HEIGHT: 64 IN | DIASTOLIC BLOOD PRESSURE: 68 MMHG | WEIGHT: 148 LBS | RESPIRATION RATE: 14 BRPM | HEART RATE: 83 BPM

## 2024-11-21 DIAGNOSIS — Z95.1 PRESENCE OF AORTOCORONARY BYPASS GRAFT: ICD-10-CM

## 2024-11-21 PROCEDURE — 99024 POSTOP FOLLOW-UP VISIT: CPT

## 2024-11-26 PROCEDURE — P9100: CPT

## 2024-11-26 PROCEDURE — 85384 FIBRINOGEN ACTIVITY: CPT

## 2024-11-26 PROCEDURE — 80053 COMPREHEN METABOLIC PANEL: CPT

## 2024-11-26 PROCEDURE — 87637 SARSCOV2&INF A&B&RSV AMP PRB: CPT

## 2024-11-26 PROCEDURE — C1751: CPT

## 2024-11-26 PROCEDURE — 85014 HEMATOCRIT: CPT

## 2024-11-26 PROCEDURE — 86900 BLOOD TYPING SEROLOGIC ABO: CPT

## 2024-11-26 PROCEDURE — 85018 HEMOGLOBIN: CPT

## 2024-11-26 PROCEDURE — 84295 ASSAY OF SERUM SODIUM: CPT

## 2024-11-26 PROCEDURE — 83880 ASSAY OF NATRIURETIC PEPTIDE: CPT

## 2024-11-26 PROCEDURE — C1769: CPT

## 2024-11-26 PROCEDURE — 85610 PROTHROMBIN TIME: CPT

## 2024-11-26 PROCEDURE — P9045: CPT

## 2024-11-26 PROCEDURE — P9012: CPT

## 2024-11-26 PROCEDURE — 93005 ELECTROCARDIOGRAM TRACING: CPT

## 2024-11-26 PROCEDURE — 0241U: CPT

## 2024-11-26 PROCEDURE — 85730 THROMBOPLASTIN TIME PARTIAL: CPT

## 2024-11-26 PROCEDURE — 83605 ASSAY OF LACTIC ACID: CPT

## 2024-11-26 PROCEDURE — 84480 ASSAY TRIIODOTHYRONINE (T3): CPT

## 2024-11-26 PROCEDURE — 97110 THERAPEUTIC EXERCISES: CPT

## 2024-11-26 PROCEDURE — 97162 PT EVAL MOD COMPLEX 30 MIN: CPT

## 2024-11-26 PROCEDURE — 94640 AIRWAY INHALATION TREATMENT: CPT

## 2024-11-26 PROCEDURE — P9016: CPT

## 2024-11-26 PROCEDURE — 84436 ASSAY OF TOTAL THYROXINE: CPT

## 2024-11-26 PROCEDURE — 85652 RBC SED RATE AUTOMATED: CPT

## 2024-11-26 PROCEDURE — 84132 ASSAY OF SERUM POTASSIUM: CPT

## 2024-11-26 PROCEDURE — 84100 ASSAY OF PHOSPHORUS: CPT

## 2024-11-26 PROCEDURE — 85396 CLOTTING ASSAY WHOLE BLOOD: CPT

## 2024-11-26 PROCEDURE — 80202 ASSAY OF VANCOMYCIN: CPT

## 2024-11-26 PROCEDURE — P9047: CPT

## 2024-11-26 PROCEDURE — 82150 ASSAY OF AMYLASE: CPT

## 2024-11-26 PROCEDURE — 82962 GLUCOSE BLOOD TEST: CPT

## 2024-11-26 PROCEDURE — 83735 ASSAY OF MAGNESIUM: CPT

## 2024-11-26 PROCEDURE — 86965 POOLING BLOOD PLATELETS: CPT

## 2024-11-26 PROCEDURE — 99291 CRITICAL CARE FIRST HOUR: CPT

## 2024-11-26 PROCEDURE — 97116 GAIT TRAINING THERAPY: CPT

## 2024-11-26 PROCEDURE — 93320 DOPPLER ECHO COMPLETE: CPT

## 2024-11-26 PROCEDURE — 81003 URINALYSIS AUTO W/O SCOPE: CPT

## 2024-11-26 PROCEDURE — 83690 ASSAY OF LIPASE: CPT

## 2024-11-26 PROCEDURE — 36415 COLL VENOUS BLD VENIPUNCTURE: CPT

## 2024-11-26 PROCEDURE — 84439 ASSAY OF FREE THYROXINE: CPT

## 2024-11-26 PROCEDURE — 84443 ASSAY THYROID STIM HORMONE: CPT

## 2024-11-26 PROCEDURE — 99292 CRITICAL CARE ADDL 30 MIN: CPT

## 2024-11-26 PROCEDURE — 82435 ASSAY OF BLOOD CHLORIDE: CPT

## 2024-11-26 PROCEDURE — 36430 TRANSFUSION BLD/BLD COMPNT: CPT

## 2024-11-26 PROCEDURE — 86891 AUTOLOGOUS BLOOD OP SALVAGE: CPT

## 2024-11-26 PROCEDURE — 97166 OT EVAL MOD COMPLEX 45 MIN: CPT

## 2024-11-26 PROCEDURE — 87641 MR-STAPH DNA AMP PROBE: CPT

## 2024-11-26 PROCEDURE — 33967 INSERT I-AORT PERCUT DEVICE: CPT

## 2024-11-26 PROCEDURE — 84484 ASSAY OF TROPONIN QUANT: CPT

## 2024-11-26 PROCEDURE — 86850 RBC ANTIBODY SCREEN: CPT

## 2024-11-26 PROCEDURE — 87640 STAPH A DNA AMP PROBE: CPT

## 2024-11-26 PROCEDURE — 82803 BLOOD GASES ANY COMBINATION: CPT

## 2024-11-26 PROCEDURE — 82947 ASSAY GLUCOSE BLOOD QUANT: CPT

## 2024-11-26 PROCEDURE — 85025 COMPLETE CBC W/AUTO DIFF WBC: CPT

## 2024-11-26 PROCEDURE — 86985 SPLIT BLOOD OR PRODUCTS: CPT

## 2024-11-26 PROCEDURE — 82553 CREATINE MB FRACTION: CPT

## 2024-11-26 PROCEDURE — 80048 BASIC METABOLIC PNL TOTAL CA: CPT

## 2024-11-26 PROCEDURE — 97530 THERAPEUTIC ACTIVITIES: CPT

## 2024-11-26 PROCEDURE — 94010 BREATHING CAPACITY TEST: CPT

## 2024-11-26 PROCEDURE — 87086 URINE CULTURE/COLONY COUNT: CPT

## 2024-11-26 PROCEDURE — 93325 DOPPLER ECHO COLOR FLOW MAPG: CPT

## 2024-11-26 PROCEDURE — 82330 ASSAY OF CALCIUM: CPT

## 2024-11-26 PROCEDURE — 85027 COMPLETE CBC AUTOMATED: CPT

## 2024-11-26 PROCEDURE — P9037: CPT

## 2024-11-26 PROCEDURE — 94002 VENT MGMT INPAT INIT DAY: CPT

## 2024-11-26 PROCEDURE — 93306 TTE W/DOPPLER COMPLETE: CPT

## 2024-11-26 PROCEDURE — 81001 URINALYSIS AUTO W/SCOPE: CPT

## 2024-11-26 PROCEDURE — P9011: CPT

## 2024-11-26 PROCEDURE — 96374 THER/PROPH/DIAG INJ IV PUSH: CPT

## 2024-11-26 PROCEDURE — 83036 HEMOGLOBIN GLYCOSYLATED A1C: CPT

## 2024-11-26 PROCEDURE — 93880 EXTRACRANIAL BILAT STUDY: CPT

## 2024-11-26 PROCEDURE — 80061 LIPID PANEL: CPT

## 2024-11-26 PROCEDURE — 85576 BLOOD PLATELET AGGREGATION: CPT

## 2024-11-26 PROCEDURE — C1889: CPT

## 2024-11-26 PROCEDURE — 92610 EVALUATE SWALLOWING FUNCTION: CPT

## 2024-11-26 PROCEDURE — 86901 BLOOD TYPING SEROLOGIC RH(D): CPT

## 2024-11-26 PROCEDURE — 71045 X-RAY EXAM CHEST 1 VIEW: CPT

## 2024-11-26 PROCEDURE — 84145 PROCALCITONIN (PCT): CPT

## 2024-11-26 PROCEDURE — 86923 COMPATIBILITY TEST ELECTRIC: CPT

## 2024-11-26 PROCEDURE — C1887: CPT

## 2024-11-26 PROCEDURE — 85520 HEPARIN ASSAY: CPT

## 2024-11-26 PROCEDURE — 87040 BLOOD CULTURE FOR BACTERIA: CPT

## 2024-11-26 PROCEDURE — 93456 R HRT CORONARY ARTERY ANGIO: CPT

## 2024-11-26 PROCEDURE — 74018 RADEX ABDOMEN 1 VIEW: CPT

## 2024-11-26 PROCEDURE — C1894: CPT

## 2024-11-26 PROCEDURE — 86140 C-REACTIVE PROTEIN: CPT

## 2024-11-26 PROCEDURE — 76705 ECHO EXAM OF ABDOMEN: CPT

## 2024-11-26 PROCEDURE — 82550 ASSAY OF CK (CPK): CPT

## 2024-12-05 ENCOUNTER — APPOINTMENT (OUTPATIENT)
Dept: CARDIOTHORACIC SURGERY | Facility: CLINIC | Age: 74
End: 2024-12-05

## 2024-12-05 DIAGNOSIS — Z95.1 PRESENCE OF AORTOCORONARY BYPASS GRAFT: ICD-10-CM

## 2024-12-17 ENCOUNTER — APPOINTMENT (OUTPATIENT)
Dept: CARDIOLOGY | Facility: CLINIC | Age: 74
End: 2024-12-17

## 2025-04-11 NOTE — DISCHARGE NOTE PROVIDER - NSDCDCMDCOMP_GEN_ALL_CORE
[place] PDMP reviewed, appropriate for refill of controlled substance.     This document is complete and the patient is ready for discharge.

## (undated) DEVICE — PAD NERVE PHRENIC NERVE

## (undated) DEVICE — DRSG OPSITE 13.75 X 4"

## (undated) DEVICE — PREP CHLORAPREP HI-LITE ORANGE 26ML

## (undated) DEVICE — MEDTRONIC CLEARVIEW BLOWER MISTER KIT W TUBING SET

## (undated) DEVICE — SUT PROLENE 7-0 24" BV175-6

## (undated) DEVICE — ELCTR BOVIE PENCIL HANDPIECE ROCKER SWITCH 15FT

## (undated) DEVICE — DRAIN CHANNEL 32FR ROUND HUBLESS FULL FLUTED

## (undated) DEVICE — SAW BLADE STRYKER STERNUM 31MM X 6.27 X .79

## (undated) DEVICE — DRAPE TOWEL BLUE 17" X 24"

## (undated) DEVICE — GLV 7.5 PROTEXIS (WHITE)

## (undated) DEVICE — CHEST DRAIN PLEUR-EVAC DRY/WET ADULT-PEDS SINGLE (QUICK)

## (undated) DEVICE — SUT PROLENE 6-0 30" C-1

## (undated) DEVICE — SAW BLADE SYNVASIVE OSCILLATING

## (undated) DEVICE — DRSG STERISTRIPS 0.5 X 4"

## (undated) DEVICE — SUT QUILL MONODERM 3-0 30CM PS-2

## (undated) DEVICE — BLADE SCALPEL SAFETYLOCK #15

## (undated) DEVICE — FEEDING TUBE NG SUMP 16FR 48"

## (undated) DEVICE — SUT DOUBLE 6 WIRE STERNAL

## (undated) DEVICE — PACK UNIVERSAL CARDIAC

## (undated) DEVICE — SUT VICRYL 3-0 27" CT-1

## (undated) DEVICE — CATH IV SAFE INSYTE 18G X 1.16" (GREEN)

## (undated) DEVICE — SAW BLADE MICROAIRE OSCILLATING LG 0.9X64X33.5MM

## (undated) DEVICE — SUT SOFSILK 0 30" TIES

## (undated) DEVICE — Device

## (undated) DEVICE — TUBING SUCTION NON CONDUCTIVE 316X18" STERILE

## (undated) DEVICE — SUMP PERICARDIAL 20FR 1/4" ADULT

## (undated) DEVICE — SYNOVIS VASCULAR PROBE 1.5MM 15CM

## (undated) DEVICE — STABILIZER HAND ASSISTANT ATTACHMENT W STABLESOFT 2S

## (undated) DEVICE — DRSG TEGADERM 6"X8"

## (undated) DEVICE — ELCTR BOVIE TIP BLADE MEGADYNE E-Z CLEAN 2.5" (SHORT)

## (undated) DEVICE — STABILIZER TISSUE OCTOPUS EVOLUTION

## (undated) DEVICE — KNIFE MICRO UNITOME 30 DEGREE 3.5MM

## (undated) DEVICE — SUT ETHIBOND 1 30" CT-1

## (undated) DEVICE — TOURNIQUET SET 12FR (1 RED, 1 BLUE, 1 SNARE) 7"

## (undated) DEVICE — GOWN SLEEVES

## (undated) DEVICE — SPECIMEN CONTAINER 4OZ

## (undated) DEVICE — TOURNIQUET SET TOURNIKWIK 12FR (4 TUBES, 1 SNARE) 7.5"

## (undated) DEVICE — SYR LUER LOK 20CC

## (undated) DEVICE — BLADE SCALPEL SAFETYLOCK #11

## (undated) DEVICE — VESSEL LOOP MAXI-RED  0.120" X 16"

## (undated) DEVICE — NDL COUNTER FOAM AND MAGNET 40-70

## (undated) DEVICE — DRAPE SLUSH / WARMER 44 X 66"

## (undated) DEVICE — BEAVER BLADE MICRO SHARP 15 DEGREE 3MM (BLUE)

## (undated) DEVICE — DRSG DERMABOND PRINEO 22CM

## (undated) DEVICE — SUT MONOCRYL 4-0 18" PS-2

## (undated) DEVICE — SYR LUER LOK 1CC

## (undated) DEVICE — SYS VESSEL HARVEST VASOVIEW 6 SHRT 30ML

## (undated) DEVICE — VASOVIEW HEMOPRO 2

## (undated) DEVICE — SUT SILK 2-0 18" SH (POP-OFF)

## (undated) DEVICE — DOPPLER PROBE 20MHZ DISP

## (undated) DEVICE — PACING CABLE (BLUE) ATRIAL TEMP SCREW DOWN 12FT

## (undated) DEVICE — MEDTRONIC URCHIN EVO HEART POSITIONER & CANISTER TUBING SET

## (undated) DEVICE — DRAPE 3/4 SHEET W REINFORCEMENT 56X77"

## (undated) DEVICE — SUT SILK 0 30" TIES

## (undated) DEVICE — SUT PROLENE 4-0 36" RB-1

## (undated) DEVICE — SOL IRR POUR H2O 250ML

## (undated) DEVICE — SUT PROLENE 8-0 24" BV175-6

## (undated) DEVICE — DRSG ACE BANDAGE 6"

## (undated) DEVICE — TUBING SUCTION 20FT

## (undated) DEVICE — SUT PROLENE 4-0 30" SH-1

## (undated) DEVICE — SOL NORMOSOL-R PH7.4 1000ML

## (undated) DEVICE — WARMING BLANKET FULL ADULT

## (undated) DEVICE — SUT SOFSILK 0 18" TIES

## (undated) DEVICE — TUBING KIT FAST START ATF 40

## (undated) DEVICE — NDL HYPO SAFE 18G X 1.5" (PINK)

## (undated) DEVICE — STOPCOCK 4-WAY (BLUE) DISCOFIX SPIN-LOCK CONNECTOR

## (undated) DEVICE — SOL IRR BAG NS 0.9% 3000ML

## (undated) DEVICE — SUT PLEDGET SOFT LARGE 3/8" X 3/16" X 1/16" X6

## (undated) DEVICE — DRAIN RESERVOIR FOR JACKSON PRATT 100CC CARDINAL

## (undated) DEVICE — BULLDOG SPRING CLIP LATIS/LATIS 6MM 1/2 FORCE (BLUE)

## (undated) DEVICE — SYR LUER LOK 30CC

## (undated) DEVICE — GLV 7 PROTEXIS (WHITE)

## (undated) DEVICE — STAPLER SKIN VISI-STAT 35 WIDE

## (undated) DEVICE — GOWN TRIMAX XXL

## (undated) DEVICE — SUT STAINLESS STEEL 5 18" CCS

## (undated) DEVICE — CONNECTOR "Y" 3/8 X 1/4 X 3/8"

## (undated) DEVICE — SOL IRR POUR NS 0.9% 500ML

## (undated) DEVICE — MULTIPLE PERFUSION SET FEMALE 1 INLET LEG W 4 LEGS 15" (BLUE/RED)

## (undated) DEVICE — DRAPE IOBAN 33" X 23"

## (undated) DEVICE — SUT ETHIBOND EXCEL 2-0 30" SH-1

## (undated) DEVICE — DRAPE MAYO STAND 30"

## (undated) DEVICE — CATH IV SAFE BC 24G X 0.75" (YELLOW)

## (undated) DEVICE — CONNECTOR CARDIAC 1:1 FOR HUBLESS DRAINS

## (undated) DEVICE — SUT SILK 5-0 60" TIES

## (undated) DEVICE — GOWN TRIMAX LG

## (undated) DEVICE — SAW BLADE MICROAIRE STERNUM 1X34X9.4MM

## (undated) DEVICE — PACK W INSPIRE OXYGENATOR W HEMOCONCENTRATOR

## (undated) DEVICE — BLADE SCALPEL SAFETYLOCK #10

## (undated) DEVICE — SENSOR MYOCARDIAL TEMP 15MM

## (undated) DEVICE — CONNECTOR STRAIGHT 3/8 X 3/8"

## (undated) DEVICE — SUT BOOT STANDARD (YELLOW) 5 PAIR

## (undated) DEVICE — BLOWER MISTER VIPER II

## (undated) DEVICE — VESSEL LOOP MAXI-BLUE 0.120" X 16"

## (undated) DEVICE — AORTIC PUNCH 4.0MM LONG LENGTH HANDLE

## (undated) DEVICE — DRSG DERMABOND PRINEO 60CM

## (undated) DEVICE — SYR LUER LOK 50CC

## (undated) DEVICE — BEAVER BLADE MINI SHARP ALL ROUND (BLUE)

## (undated) DEVICE — LAP PAD 18 X 18"

## (undated) DEVICE — PACK CARDIAC YELLOW

## (undated) DEVICE — DRAPE 1/2 SHEET 40X57"

## (undated) DEVICE — DRAPE C ARM UNIVERSAL

## (undated) DEVICE — SUT SOFSILK 4-0 24" CV-15

## (undated) DEVICE — POSITIONER FOAM EGG CRATE ULNAR 2PCS (PINK)

## (undated) DEVICE — CATH IV SAFE INSYTE 24G X 3/4" (YELLOW)

## (undated) DEVICE — SUT PLEDGET 9MM X 4MM X 1.5MM

## (undated) DEVICE — DRSG OPSITE 2.5 X 2"

## (undated) DEVICE — DRSG KLING 6"

## (undated) DEVICE — SUT PROLENE 7-0 24" BV-1

## (undated) DEVICE — DRAPE SLUSH MACHINE 48" X 48"

## (undated) DEVICE — SUT VICRYL 1 36" CTX UNDYED

## (undated) DEVICE — SUCTION YANKAUER BULBOUS TIP NO VENT

## (undated) DEVICE — TUBING TRUWAVE PRESSURE MALE/FEMALE 72"

## (undated) DEVICE — SYS VEIN HARVESTING VIRTUOSAPH PLUS W/ RADIAL

## (undated) DEVICE — FILTER REINFUSION FOR SALVAGED BLOOD DISP

## (undated) DEVICE — POSITIONER CARDIAC BUMP

## (undated) DEVICE — CONNECTOR STRAIGHT 3/8 X 1/2"

## (undated) DEVICE — GOWN LG

## (undated) DEVICE — SPECIMEN CONTAINER 100ML

## (undated) DEVICE — SPONGE PEANUT AUTO COUNT

## (undated) DEVICE — PACING CABLE (BROWN) A/V TEMP SCREW DOWN 12FT

## (undated) DEVICE — CHEST DRAIN PLEUR-EVAC WET/WET ADULT-PEDS SINGLE (QUICK)

## (undated) DEVICE — FOLEY TRAY 16FR 5CC LF LUBRISIL ADVANCE TEMP CLOSED

## (undated) DEVICE — AORTIC PUNCH 5MM STANDARD HANDLE

## (undated) DEVICE — CANISTER SUCTION 2000CC

## (undated) DEVICE — SUT VICRYL 0 36" CTX UNDYED